# Patient Record
Sex: FEMALE | Race: WHITE | NOT HISPANIC OR LATINO | Employment: FULL TIME | URBAN - METROPOLITAN AREA
[De-identification: names, ages, dates, MRNs, and addresses within clinical notes are randomized per-mention and may not be internally consistent; named-entity substitution may affect disease eponyms.]

---

## 2019-03-22 PROBLEM — L70.9 ACNE: Status: ACTIVE | Noted: 2019-03-22

## 2019-03-22 PROBLEM — Z86.59 HISTORY OF MAJOR DEPRESSION: Status: ACTIVE | Noted: 2019-03-22

## 2019-03-22 PROBLEM — Z86.59 HISTORY OF POSTTRAUMATIC STRESS DISORDER (PTSD): Status: ACTIVE | Noted: 2019-03-22

## 2019-03-22 PROBLEM — G43.109 MIGRAINE HEADACHE WITH AURA: Status: ACTIVE | Noted: 2019-03-22

## 2019-03-22 PROBLEM — N94.6 DYSMENORRHEA: Status: ACTIVE | Noted: 2019-03-22

## 2019-03-25 ENCOUNTER — OFFICE VISIT (OUTPATIENT)
Dept: INTERNAL MEDICINE | Facility: CLINIC | Age: 29
End: 2019-03-25
Payer: COMMERCIAL

## 2019-03-25 VITALS
WEIGHT: 136.44 LBS | DIASTOLIC BLOOD PRESSURE: 76 MMHG | BODY MASS INDEX: 24.18 KG/M2 | SYSTOLIC BLOOD PRESSURE: 126 MMHG | HEART RATE: 88 BPM | OXYGEN SATURATION: 99 % | HEIGHT: 63 IN

## 2019-03-25 DIAGNOSIS — R01.1 HEART MURMUR: ICD-10-CM

## 2019-03-25 DIAGNOSIS — L50.9 URTICARIA: ICD-10-CM

## 2019-03-25 DIAGNOSIS — R20.2 PARESTHESIA: ICD-10-CM

## 2019-03-25 DIAGNOSIS — G43.109 MIGRAINE WITH AURA AND WITHOUT STATUS MIGRAINOSUS, NOT INTRACTABLE: Primary | ICD-10-CM

## 2019-03-25 DIAGNOSIS — L70.9 ACNE, UNSPECIFIED ACNE TYPE: ICD-10-CM

## 2019-03-25 DIAGNOSIS — N94.6 DYSMENORRHEA: ICD-10-CM

## 2019-03-25 DIAGNOSIS — Z13.6 ENCOUNTER FOR LIPID SCREENING FOR CARDIOVASCULAR DISEASE: ICD-10-CM

## 2019-03-25 DIAGNOSIS — Z13.29 SCREENING FOR THYROID DISORDER: ICD-10-CM

## 2019-03-25 DIAGNOSIS — Z00.00 HEALTHCARE MAINTENANCE: ICD-10-CM

## 2019-03-25 DIAGNOSIS — R03.0 ELEVATED BLOOD PRESSURE READING WITHOUT DIAGNOSIS OF HYPERTENSION: ICD-10-CM

## 2019-03-25 DIAGNOSIS — Z13.220 ENCOUNTER FOR LIPID SCREENING FOR CARDIOVASCULAR DISEASE: ICD-10-CM

## 2019-03-25 DIAGNOSIS — Z13.1 ENCOUNTER FOR SCREENING FOR DIABETES MELLITUS: ICD-10-CM

## 2019-03-25 DIAGNOSIS — Z80.3 FAMILY HISTORY OF BREAST CANCER: ICD-10-CM

## 2019-03-25 PROCEDURE — 99999 PR PBB SHADOW E&M-NEW PATIENT-LVL III: ICD-10-PCS | Mod: PBBFAC,,, | Performed by: INTERNAL MEDICINE

## 2019-03-25 PROCEDURE — 99999 PR PBB SHADOW E&M-NEW PATIENT-LVL III: CPT | Mod: PBBFAC,,, | Performed by: INTERNAL MEDICINE

## 2019-03-25 PROCEDURE — 99205 PR OFFICE/OUTPT VISIT, NEW, LEVL V, 60-74 MIN: ICD-10-PCS | Mod: S$GLB,,, | Performed by: INTERNAL MEDICINE

## 2019-03-25 PROCEDURE — 99205 OFFICE O/P NEW HI 60 MIN: CPT | Mod: S$GLB,,, | Performed by: INTERNAL MEDICINE

## 2019-03-25 PROCEDURE — 3008F PR BODY MASS INDEX (BMI) DOCUMENTED: ICD-10-PCS | Mod: CPTII,S$GLB,, | Performed by: INTERNAL MEDICINE

## 2019-03-25 PROCEDURE — 3008F BODY MASS INDEX DOCD: CPT | Mod: CPTII,S$GLB,, | Performed by: INTERNAL MEDICINE

## 2019-03-25 RX ORDER — LORATADINE 10 MG/1
10 TABLET ORAL DAILY
COMMUNITY
End: 2021-09-29

## 2019-03-25 RX ORDER — SUMATRIPTAN 50 MG/1
50 TABLET, FILM COATED ORAL
Qty: 18 TABLET | Refills: 3 | Status: SHIPPED | OUTPATIENT
Start: 2019-03-25 | End: 2019-04-15

## 2019-03-25 NOTE — PROGRESS NOTES
Subjective:       Patient ID: Delaney Arechiga is a 29 y.o. female who  has a past medical history of Family history of breast cancer, Heart murmur (3/25/2019), and Urticaria.    Chief Complaint: Establish Care; Headache; and Hypertension     HPI    History was obtained from the patient and supplemented through chart review  There were no prior records to review.  Has upcoming appointment with OBGYN and Dermatology.    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    Migraine with aura:   The patient complains of bitemporal, behind both eyes, bioccipatal headaches since 14 yoa.  She ran out of Imitrex 1 year ago.  The headache used to always last for days, but never 7 days.  Over the past few months after she started her new job, it has been lasting 7 days; she will be migraine free for a few days, it will then return. It is progressively worsening in duration.  She tends to not have migraines on the weekends, but can also have migraines lasting 7 days.  Imitrex helped in the past (requests brand name Imitrex due to family history of anaphylaxis to sumatriptan).  Has a strong family history of migraines.  Her mom is allergic to Botox injections.      It is associated with photophobia, phonophobia and nausea.  Tylenol, NSAIDs have not helped in the past.  Triggers include lack of sleep and stress.  There is aura with flashing lights.  Takes Zyrtec daily for urticaria.  Denies rhinorrhea, but with eye tearing not always associated with HA.  Her father also has cluster headache.    Currently on IUD.  Used to be on Nexplanon.  Was on OCPs a very long time ago.  No change in headaches with changes in birth control.    Elevated BP:  Has checked her BP at work, and has been 124 /80s to 90s.    Acne vulgaris:  Follows with Dermatology.    Dysmenorrhea:    Has excruciating suprapubic cramps.  Denies dysuria, change in vaginal discharge. Abdominal cramps occur every month.  Is on the IUD and has had lighter periods.  Does not  try anything OTC.  Has upcoming appointment with OBGYN.    LLE paresthesia:  Has seen a Chiropractor without relief.  In addition, also has feet numbness after sitting.  Denies dietary restrictions.    Family history of breast cancer:  MGM, 2 maternal aunts with breast cancer; the youngest was that age 35. PGM with ovarian cancer.  No one in her family has been checked for BRCA mutation.    Urticaria:  Takes Claritin daily for hives.  Has not seen an allergist due to cost.    Heart Murmur:  Murmur has been present since childhood.  Has sharp chest pain about once a week lasting for about a second without SOB, ANN, lightheadedness, dizziness.  Is associated with caffeine intake.    Review of Systems   Constitutional: Positive for activity change. Negative for unexpected weight change.   HENT: Negative for hearing loss, rhinorrhea and trouble swallowing.    Eyes: Negative for discharge and visual disturbance.   Respiratory: Negative for chest tightness and wheezing.    Cardiovascular: Negative for chest pain and palpitations.   Gastrointestinal: Negative for blood in stool, constipation, diarrhea and vomiting.   Endocrine: Negative for polydipsia and polyuria.   Genitourinary: Positive for menstrual problem. Negative for difficulty urinating, dysuria and hematuria.   Musculoskeletal: Negative for arthralgias, joint swelling and neck pain.   Skin: Positive for rash. Negative for wound.        Chronic urticaria   Neurological: Positive for headaches. Negative for weakness.   Psychiatric/Behavioral: Positive for dysphoric mood. Negative for confusion.       Past Medical History:   Diagnosis Date    Family history of breast cancer     Heart murmur 3/25/2019    Urticaria      Past Surgical History:   Procedure Laterality Date    BUNIONECTOMY Right     TONSILLECTOMY       Family History   Problem Relation Age of Onset    Migraines Mother     Stroke Mother     Migraines Father     Aortic aneurysm Father      "Peripheral vascular disease Father         s/p leg stent    Migraines Sister     Breast cancer Maternal Grandmother 80    Heart failure Maternal Grandfather     Ovarian cancer Paternal Grandmother     Breast cancer Maternal Aunt 35    Breast cancer Maternal Aunt 40     Social History     Socioeconomic History    Marital status: Single     Spouse name: Not on file    Number of children: Not on file    Years of education: Not on file    Highest education level: Not on file   Occupational History    Not on file   Social Needs    Financial resource strain: Not on file    Food insecurity:     Worry: Not on file     Inability: Not on file    Transportation needs:     Medical: Not on file     Non-medical: Not on file   Tobacco Use    Smoking status: Former Smoker     Types: Cigarettes     Last attempt to quit: 2018     Years since quittin.2    Smokeless tobacco: Never Used   Substance and Sexual Activity    Alcohol use: Not on file    Drug use: Not on file    Sexual activity: Not on file   Lifestyle    Physical activity:     Days per week: Not on file     Minutes per session: Not on file    Stress: Not on file   Relationships    Social connections:     Talks on phone: Not on file     Gets together: Not on file     Attends Latter-day service: Not on file     Active member of club or organization: Not on file     Attends meetings of clubs or organizations: Not on file     Relationship status: Not on file    Intimate partner violence:     Fear of current or ex partner: Not on file     Emotionally abused: Not on file     Physically abused: Not on file     Forced sexual activity: Not on file   Other Topics Concern    Not on file   Social History Narrative    Not on file     Objective:      Vitals:    19 1536   BP: 126/76   Pulse: 88   SpO2: 99%   Weight: 61.9 kg (136 lb 7.4 oz)   Height: 5' 3" (1.6 m)      Physical Exam   Constitutional: She appears well-developed and well-nourished. No " distress.   HENT:   Head: Normocephalic and atraumatic.   Nose: Nose normal.   Mouth/Throat: Oropharynx is clear and moist. No oropharyngeal exudate.   Eyes: Pupils are equal, round, and reactive to light. Conjunctivae and EOM are normal. Right eye exhibits no discharge. Left eye exhibits no discharge. No scleral icterus.   Neck: Neck supple. No tracheal deviation present. No thyromegaly present.   Cardiovascular: Normal rate, regular rhythm and intact distal pulses.   Murmur heard.  2/6 heart murmur   Pulmonary/Chest: Effort normal and breath sounds normal. No respiratory distress. She has no wheezes.   Abdominal: Soft. Bowel sounds are normal. There is no tenderness.   Musculoskeletal: She exhibits no edema or deformity.   Lymphadenopathy:     She has no cervical adenopathy.   Neurological: She is alert. No cranial nerve deficit. Gait normal.   Skin: Skin is warm and dry. Capillary refill takes less than 2 seconds. No rash noted. She is not diaphoretic. No erythema.   Psychiatric: She has a normal mood and affect. Her behavior is normal.         No results found for: WBC, HGB, HCT, PLT, CHOL, TRIG, HDL, LDLDIRECT, ALT, AST, NA, K, CL, CREATININE, BUN, CO2, TSH, PSA, INR, GLUF, HGBA1C, MICROALBUR    The ASCVD Risk score (Kiana DC Jr., et al., 2013) failed to calculate for the following reasons:    The 2013 ASCVD risk score is only valid for ages 40 to 79    (Imaging have been independently reviewed)  none    Assessment:       1. Migraine with aura and without status migrainosus, not intractable    2. Elevated blood pressure reading without diagnosis of hypertension    3. Acne, unspecified acne type    4. Dysmenorrhea    5. Paresthesia    6. Family history of breast cancer    7. Urticaria    8. Heart murmur    9. Healthcare maintenance    10. Encounter for lipid screening for cardiovascular disease    11. Encounter for screening for diabetes mellitus    12. Screening for thyroid disorder          Plan:       Delaney  was seen today for establish care, headache and hypertension.    Diagnoses and all orders for this visit:    Migraine with aura and without status migrainosus, not intractable  Comments:  Increasing in duration after starting her new job. Imitrex at onset of HA, try to reduce triggers. If lasting for days, trial of Prednisone. Consider PPX, neuro  Orders:  -     TSH; Future  -     sumatriptan (IMITREX) 50 MG tablet; Take 1 tablet (50 mg total) by mouth every 2 (two) hours as needed for Migraine.    Elevated blood pressure reading without diagnosis of hypertension  Comments:  Increased diastolic BP 90s.  No medications at this time.  Will continue to monitor    Acne, unspecified acne type  Comments:  Will see Dermatology.    Dysmenorrhea  Comments:  Severe abdominal cramps.  On IUD.  Will see OBGYN.  Orders:  -     CBC auto differential; Future  -     TSH; Future    Paresthesia  Comments:  LLE and feet.  Check A1c, B12.  Will address at future visit  Orders:  -     Comprehensive metabolic panel; Future  -     Hemoglobin A1c; Future  -     Vitamin B12; Future    Family history of breast cancer  Comments:  Strong family history with early FHx breast cancer. No one in her family has been checked for BRCA mutation.  Will discuss with OBGYN.    Urticaria  Comments:  On Claritin daily.  No acute issues.    Heart murmur  Comments:  Since childhood.  Very transient sharp CP without other associated symptoms. Assoc with caffeine. If more persistent or increased duration, will pursue TTE    Healthcare maintenance  -     CBC auto differential; Future  -     Comprehensive metabolic panel; Future    Encounter for lipid screening for cardiovascular disease  -     Lipid panel; Future    Encounter for screening for diabetes mellitus  -     Hemoglobin A1c; Future    Screening for thyroid disorder  Comments:  Checking due to menstrual cramps, persistent migraine  Orders:  -     TSH; Future         I have spent 40 minutes face to face  with the patient, at least of 50% of which was spent counseling on migraine.    Notification of Lab Results: [facility]    Side effects of medication(s) were discussed in detail and patient voiced understanding.  Patient will call back for any issues or complications.     RTC in 1 month(s) or sooner PRN for migraine with aura.  Consider adding prophylactic treatment.

## 2019-03-26 ENCOUNTER — PATIENT MESSAGE (OUTPATIENT)
Dept: INTERNAL MEDICINE | Facility: CLINIC | Age: 29
End: 2019-03-26

## 2019-03-27 ENCOUNTER — OFFICE VISIT (OUTPATIENT)
Dept: OBSTETRICS AND GYNECOLOGY | Facility: CLINIC | Age: 29
End: 2019-03-27
Payer: COMMERCIAL

## 2019-03-27 VITALS
HEIGHT: 63 IN | BODY MASS INDEX: 24.45 KG/M2 | DIASTOLIC BLOOD PRESSURE: 70 MMHG | WEIGHT: 138 LBS | SYSTOLIC BLOOD PRESSURE: 100 MMHG

## 2019-03-27 DIAGNOSIS — Z11.3 VENEREAL DISEASE SCREENING: ICD-10-CM

## 2019-03-27 DIAGNOSIS — Z01.411 ENCOUNTER FOR GYNECOLOGICAL EXAMINATION WITH ABNORMAL FINDING: Primary | ICD-10-CM

## 2019-03-27 DIAGNOSIS — Z12.4 PAP SMEAR FOR CERVICAL CANCER SCREENING: ICD-10-CM

## 2019-03-27 DIAGNOSIS — R10.2 FEMALE PELVIC PAIN: ICD-10-CM

## 2019-03-27 DIAGNOSIS — N94.6 DYSMENORRHEA: ICD-10-CM

## 2019-03-27 PROCEDURE — 99385 PREV VISIT NEW AGE 18-39: CPT | Mod: S$GLB,,, | Performed by: OBSTETRICS & GYNECOLOGY

## 2019-03-27 PROCEDURE — 88175 CYTOPATH C/V AUTO FLUID REDO: CPT

## 2019-03-27 PROCEDURE — 87624 HPV HI-RISK TYP POOLED RSLT: CPT

## 2019-03-27 PROCEDURE — 99999 PR PBB SHADOW E&M-EST. PATIENT-LVL III: CPT | Mod: PBBFAC,,, | Performed by: OBSTETRICS & GYNECOLOGY

## 2019-03-27 PROCEDURE — 99999 PR PBB SHADOW E&M-EST. PATIENT-LVL III: ICD-10-PCS | Mod: PBBFAC,,, | Performed by: OBSTETRICS & GYNECOLOGY

## 2019-03-27 PROCEDURE — 87491 CHLMYD TRACH DNA AMP PROBE: CPT

## 2019-03-27 PROCEDURE — 99385 PR PREVENTIVE VISIT,NEW,18-39: ICD-10-PCS | Mod: S$GLB,,, | Performed by: OBSTETRICS & GYNECOLOGY

## 2019-03-27 NOTE — PROGRESS NOTES
Subjective:       Patient ID: Delaney Arechiga is a 29 y.o. female.    Chief Complaint:  Gynecologic Exam and Dysmenorrhea      History of Present Illness  HPI    Delaney Arechiga is a 29 y.o. female  NEW TO ME here for her annual GYN exam.  She requests STD testing. She also requests a Hysterectomy due to chronic pelvic pain. She had menarche at age 14, and was on OCP's due to severe cramps and heavy cycles. She then had the Implanon due to forgetting to take pills, as well as severe migraines during the week of the periods, and had the implanon for 3 years followed by the Nexplanon for 2 years. Cycles were light and erratic but often bled for up to three weeks on the nexplanon. Mirena was placed in early .  She describes her periods as regular, light flow, lasting 3-7 days since placement of her IUD..Still has dysmenorrhea and continues to have pelvic pain(although this has improved somewhat with the IUD)   denies break through bleeding.   denies vaginal itching or irritation.  Denies vaginal discharge.  She is not currently sexually active. She has had 1 partner during the past year.  She uses IUD for contraception.   History of abnormal pap: Yes - No treatment needed  Last Pap: approximate date  and was normal  Last MMG: None    denies domestic violence. She does feel safe at home.     Past Medical History:   Diagnosis Date    Abnormal Pap smear of cervix     Family history of breast cancer     Heart murmur 3/25/2019    Urticaria      Past Surgical History:   Procedure Laterality Date    BUNIONECTOMY Right     COLPOSCOPY      TONSILLECTOMY       Social History     Socioeconomic History    Marital status: Single     Spouse name: Not on file    Number of children: Not on file    Years of education: Not on file    Highest education level: Not on file   Occupational History    Not on file   Social Needs    Financial resource strain: Not on file    Food insecurity:     Worry: Not on  "file     Inability: Not on file    Transportation needs:     Medical: Not on file     Non-medical: Not on file   Tobacco Use    Smoking status: Former Smoker     Types: Cigarettes     Last attempt to quit: 2018     Years since quittin.2    Smokeless tobacco: Never Used   Substance and Sexual Activity    Alcohol use: Not Currently    Drug use: Never    Sexual activity: Not Currently     Partners: Male     Birth control/protection: IUD     Comment: Mirena placed 2017   Lifestyle    Physical activity:     Days per week: Not on file     Minutes per session: Not on file    Stress: Not on file   Relationships    Social connections:     Talks on phone: Not on file     Gets together: Not on file     Attends Taoism service: Not on file     Active member of club or organization: Not on file     Attends meetings of clubs or organizations: Not on file     Relationship status: Not on file    Intimate partner violence:     Fear of current or ex partner: Not on file     Emotionally abused: Not on file     Physically abused: Not on file     Forced sexual activity: Not on file   Other Topics Concern    Not on file   Social History Narrative    Not on file     Family History   Problem Relation Age of Onset    Migraines Mother     Stroke Mother 67    Hypertension Mother     Migraines Father     Aortic aneurysm Father     Peripheral vascular disease Father         s/p leg stent    Migraines Sister     Breast cancer Maternal Grandmother 80    Heart failure Maternal Grandfather     Ovarian cancer Paternal Grandmother     Breast cancer Maternal Aunt 35    Breast cancer Maternal Aunt 40    Colon cancer Neg Hx     Diabetes Neg Hx      OB History        0    Para   0    Term   0       0    AB   0    Living   0       SAB   0    TAB   0    Ectopic   0    Multiple   0    Live Births   0                 /70   Ht 5' 3" (1.6 m)   Wt 62.6 kg (138 lb 0.1 oz)   LMP 2019   " BMI 24.45 kg/m²         GYN & OB History  Patient's last menstrual period was 2019.   Date of Last Pap: No result found    OB History    Para Term  AB Living   0 0 0 0 0 0   SAB TAB Ectopic Multiple Live Births   0 0 0 0 0       Review of Systems  Review of Systems   Constitutional: Negative for activity change, appetite change, fatigue and unexpected weight change.   HENT: Negative.    Eyes: Negative for visual disturbance.   Respiratory: Negative for shortness of breath and wheezing.    Cardiovascular: Negative for chest pain, palpitations and leg swelling.   Gastrointestinal: Negative for abdominal pain, bloating and blood in stool.   Endocrine: Negative for diabetes and hair loss.   Genitourinary: Positive for dysmenorrhea, dyspareunia, menstrual problem and pelvic pain. Negative for decreased libido and vaginal dryness.   Musculoskeletal: Negative for back pain and joint swelling.   Integumentary:  Negative for acne, hair changes and nipple discharge.   Neurological: Negative for headaches.   Hematological: Does not bruise/bleed easily.   Psychiatric/Behavioral: Positive for depression. Negative for sleep disturbance. The patient is not nervous/anxious.    Breast: Negative for mastodynia and nipple discharge          Objective:      Physical Exam:   Constitutional: She is oriented to person, place, and time. She appears well-developed and well-nourished.    HENT:   Head: Normocephalic and atraumatic.    Eyes: Pupils are equal, round, and reactive to light. EOM are normal.    Neck: Normal range of motion. Neck supple.    Cardiovascular: Normal rate and regular rhythm.     Pulmonary/Chest: Effort normal and breath sounds normal.   BREASTS:  no mass, no tenderness, no deformity and no retraction. Right breast exhibits no inverted nipple, no mass, no nipple discharge, no skin change, no tenderness, no bleeding and no swelling. Left breast exhibits no inverted nipple, no mass, no nipple  discharge, no skin change, no tenderness, no bleeding and no swelling. Breasts are symmetrical.              Abdominal: Soft. Bowel sounds are normal.     Genitourinary: Pelvic exam was performed with patient supine.   Genitourinary Comments: PELVIC: Normal external genitalia without lesions.  Normal hair distribution.  Adequate perineal body, normal urethral meatus.  Vagina moist and well rugated without lesions or discharge.  Cervix pink, without lesions, discharge or tenderness. IUD STRINGS IN PLACE.  No significant cystocele or rectocele.  Bimanual exam shows uterus to be normal size, regular, mobile and mildly tender.  Adnexa without masses or tenderness on the left, Mild tenderness on the Right. + [place] tenderness.                Musculoskeletal: Normal range of motion and moves all extremeties.       Neurological: She is alert and oriented to person, place, and time.    Skin: Skin is warm and dry.    Psychiatric: She has a normal mood and affect.              Assessment:        1. Encounter for gynecological examination with abnormal finding    2. Pap smear for cervical cancer screening    3. Female pelvic pain    4. Dysmenorrhea                Plan:      1. Encounter for gynecological examination with abnormal finding  Discussed strong possibility of Endometriosis, will need to consider Laparoscopy for definitive diagnosis, and further management.     2. Pap smear for cervical cancer screening    - Liquid-based pap smear, screening  - HPV High Risk Genotypes, PCR    3. Female pelvic pain    - C. trachomatis/N. gonorrhoeae by AMP DNA    4. Dysmenorrhea      5. Venereal disease screening    - Hepatitis panel, acute; Future  - Hepatitis C antibody; Future  - HIV 1/2 Ag/Ab (4th Gen); Future  - RPR; Future       Follow up in about 1 year (around 3/27/2020).

## 2019-03-27 NOTE — Clinical Note
Please schedule for consultation for evaluation , has probable endometriosis based on history and physical findings, has never had laparoscopic diagnosis.

## 2019-03-28 ENCOUNTER — LAB VISIT (OUTPATIENT)
Dept: LAB | Facility: OTHER | Age: 29
End: 2019-03-28
Attending: INTERNAL MEDICINE
Payer: COMMERCIAL

## 2019-03-28 ENCOUNTER — TELEPHONE (OUTPATIENT)
Dept: OBSTETRICS AND GYNECOLOGY | Facility: CLINIC | Age: 29
End: 2019-03-28

## 2019-03-28 DIAGNOSIS — Z13.6 ENCOUNTER FOR LIPID SCREENING FOR CARDIOVASCULAR DISEASE: ICD-10-CM

## 2019-03-28 DIAGNOSIS — R20.2 PARESTHESIA: ICD-10-CM

## 2019-03-28 DIAGNOSIS — Z13.1 ENCOUNTER FOR SCREENING FOR DIABETES MELLITUS: ICD-10-CM

## 2019-03-28 DIAGNOSIS — N94.6 DYSMENORRHEA: ICD-10-CM

## 2019-03-28 DIAGNOSIS — Z13.29 SCREENING FOR THYROID DISORDER: ICD-10-CM

## 2019-03-28 DIAGNOSIS — Z11.3 VENEREAL DISEASE SCREENING: ICD-10-CM

## 2019-03-28 DIAGNOSIS — Z13.220 ENCOUNTER FOR LIPID SCREENING FOR CARDIOVASCULAR DISEASE: ICD-10-CM

## 2019-03-28 DIAGNOSIS — G43.109 MIGRAINE WITH AURA AND WITHOUT STATUS MIGRAINOSUS, NOT INTRACTABLE: ICD-10-CM

## 2019-03-28 DIAGNOSIS — Z00.00 HEALTHCARE MAINTENANCE: ICD-10-CM

## 2019-03-28 LAB
ALBUMIN SERPL BCP-MCNC: 4.4 G/DL (ref 3.5–5.2)
ALP SERPL-CCNC: 44 U/L (ref 55–135)
ALT SERPL W/O P-5'-P-CCNC: 12 U/L (ref 10–44)
ANION GAP SERPL CALC-SCNC: 6 MMOL/L (ref 8–16)
AST SERPL-CCNC: 14 U/L (ref 10–40)
BASOPHILS # BLD AUTO: 0.01 K/UL (ref 0–0.2)
BASOPHILS NFR BLD: 0.2 % (ref 0–1.9)
BILIRUB SERPL-MCNC: 0.6 MG/DL (ref 0.1–1)
BUN SERPL-MCNC: 6 MG/DL (ref 6–20)
C TRACH DNA SPEC QL NAA+PROBE: NOT DETECTED
CALCIUM SERPL-MCNC: 9.3 MG/DL (ref 8.7–10.5)
CHLORIDE SERPL-SCNC: 106 MMOL/L (ref 95–110)
CHOLEST SERPL-MCNC: 148 MG/DL (ref 120–199)
CHOLEST/HDLC SERPL: 2.5 {RATIO} (ref 2–5)
CO2 SERPL-SCNC: 26 MMOL/L (ref 23–29)
CREAT SERPL-MCNC: 0.7 MG/DL (ref 0.5–1.4)
DIFFERENTIAL METHOD: NORMAL
EOSINOPHIL # BLD AUTO: 0.1 K/UL (ref 0–0.5)
EOSINOPHIL NFR BLD: 2 % (ref 0–8)
ERYTHROCYTE [DISTWIDTH] IN BLOOD BY AUTOMATED COUNT: 12 % (ref 11.5–14.5)
EST. GFR  (AFRICAN AMERICAN): >60 ML/MIN/1.73 M^2
EST. GFR  (NON AFRICAN AMERICAN): >60 ML/MIN/1.73 M^2
ESTIMATED AVG GLUCOSE: 80 MG/DL (ref 68–131)
GLUCOSE SERPL-MCNC: 71 MG/DL (ref 70–110)
HBA1C MFR BLD HPLC: 4.4 % (ref 4–5.6)
HCT VFR BLD AUTO: 41.6 % (ref 37–48.5)
HDLC SERPL-MCNC: 59 MG/DL (ref 40–75)
HDLC SERPL: 39.9 % (ref 20–50)
HGB BLD-MCNC: 14.5 G/DL (ref 12–16)
LDLC SERPL CALC-MCNC: 72.6 MG/DL (ref 63–159)
LYMPHOCYTES # BLD AUTO: 2.4 K/UL (ref 1–4.8)
LYMPHOCYTES NFR BLD: 47.4 % (ref 18–48)
MCH RBC QN AUTO: 30.4 PG (ref 27–31)
MCHC RBC AUTO-ENTMCNC: 34.9 G/DL (ref 32–36)
MCV RBC AUTO: 87 FL (ref 82–98)
MONOCYTES # BLD AUTO: 0.3 K/UL (ref 0.3–1)
MONOCYTES NFR BLD: 6.7 % (ref 4–15)
N GONORRHOEA DNA SPEC QL NAA+PROBE: NOT DETECTED
NEUTROPHILS # BLD AUTO: 2.2 K/UL (ref 1.8–7.7)
NEUTROPHILS NFR BLD: 43.5 % (ref 38–73)
NONHDLC SERPL-MCNC: 89 MG/DL
PLATELET # BLD AUTO: 268 K/UL (ref 150–350)
PMV BLD AUTO: 10.8 FL (ref 9.2–12.9)
POTASSIUM SERPL-SCNC: 3.6 MMOL/L (ref 3.5–5.1)
PROT SERPL-MCNC: 7.2 G/DL (ref 6–8.4)
RBC # BLD AUTO: 4.77 M/UL (ref 4–5.4)
SODIUM SERPL-SCNC: 138 MMOL/L (ref 136–145)
TRIGL SERPL-MCNC: 82 MG/DL (ref 30–150)
TSH SERPL DL<=0.005 MIU/L-ACNC: 0.89 UIU/ML (ref 0.4–4)
VIT B12 SERPL-MCNC: 384 PG/ML (ref 210–950)
WBC # BLD AUTO: 5.04 K/UL (ref 3.9–12.7)

## 2019-03-28 PROCEDURE — 86592 SYPHILIS TEST NON-TREP QUAL: CPT

## 2019-03-28 PROCEDURE — 83036 HEMOGLOBIN GLYCOSYLATED A1C: CPT

## 2019-03-28 PROCEDURE — 86703 HIV-1/HIV-2 1 RESULT ANTBDY: CPT

## 2019-03-28 PROCEDURE — 84443 ASSAY THYROID STIM HORMONE: CPT

## 2019-03-28 PROCEDURE — 80053 COMPREHEN METABOLIC PANEL: CPT

## 2019-03-28 PROCEDURE — 36415 COLL VENOUS BLD VENIPUNCTURE: CPT

## 2019-03-28 PROCEDURE — 85025 COMPLETE CBC W/AUTO DIFF WBC: CPT

## 2019-03-28 PROCEDURE — 80061 LIPID PANEL: CPT

## 2019-03-28 PROCEDURE — 82607 VITAMIN B-12: CPT

## 2019-03-28 PROCEDURE — 80074 ACUTE HEPATITIS PANEL: CPT

## 2019-03-28 NOTE — TELEPHONE ENCOUNTER
----- Message from Flor jN MD sent at 3/27/2019  5:49 PM CDT -----  Please schedule for consultation for evaluation , has probable endometriosis based on history and physical findings, has never had laparoscopic diagnosis.

## 2019-03-28 NOTE — TELEPHONE ENCOUNTER
LVM:  Good Morning Ms Arechiga, this is Ольга from Dr Thompson's office. DR Nj has asked us to contact you and see about scheduling you with Dr Thompson for possible Endomitosis.  Please call our office back at [phone].    Thank You

## 2019-03-29 LAB
HAV IGM SERPL QL IA: NEGATIVE
HBV CORE IGM SERPL QL IA: NEGATIVE
HBV SURFACE AG SERPL QL IA: NEGATIVE
HCV AB SERPL QL IA: NEGATIVE
HCV AB SERPL QL IA: NEGATIVE
HIV 1+2 AB+HIV1 P24 AG SERPL QL IA: NEGATIVE

## 2019-04-01 LAB — RPR SER QL: NORMAL

## 2019-04-02 LAB
HPV HR 12 DNA CVX QL NAA+PROBE: POSITIVE
HPV16 AG SPEC QL: NEGATIVE
HPV18 DNA SPEC QL NAA+PROBE: NEGATIVE

## 2019-04-05 ENCOUNTER — PATIENT MESSAGE (OUTPATIENT)
Dept: INTERNAL MEDICINE | Facility: CLINIC | Age: 29
End: 2019-04-05

## 2019-04-05 ENCOUNTER — OFFICE VISIT (OUTPATIENT)
Dept: DERMATOLOGY | Facility: CLINIC | Age: 29
End: 2019-04-05
Payer: COMMERCIAL

## 2019-04-05 DIAGNOSIS — L70.0 ACNE VULGARIS: Primary | ICD-10-CM

## 2019-04-05 PROCEDURE — 99999 PR PBB SHADOW E&M-EST. PATIENT-LVL II: CPT | Mod: PBBFAC,,, | Performed by: PHYSICIAN ASSISTANT

## 2019-04-05 PROCEDURE — 99999 PR PBB SHADOW E&M-EST. PATIENT-LVL II: ICD-10-PCS | Mod: PBBFAC,,, | Performed by: PHYSICIAN ASSISTANT

## 2019-04-05 PROCEDURE — 99203 PR OFFICE/OUTPT VISIT, NEW, LEVL III, 30-44 MIN: ICD-10-PCS | Mod: S$GLB,,, | Performed by: PHYSICIAN ASSISTANT

## 2019-04-05 PROCEDURE — 99203 OFFICE O/P NEW LOW 30 MIN: CPT | Mod: S$GLB,,, | Performed by: PHYSICIAN ASSISTANT

## 2019-04-05 RX ORDER — SPIRONOLACTONE 50 MG/1
TABLET, FILM COATED ORAL
Qty: 60 TABLET | Refills: 2 | Status: SHIPPED | OUTPATIENT
Start: 2019-04-05 | End: 2019-07-22 | Stop reason: SDUPTHER

## 2019-04-05 RX ORDER — CLINDAMYCIN PHOSPHATE AND TRETINOIN GEL 1.2%/0.025% 10; .25 MG/G; MG/G
GEL TOPICAL NIGHTLY
Qty: 30 G | Refills: 2 | Status: SHIPPED | OUTPATIENT
Start: 2019-04-05 | End: 2019-06-04

## 2019-04-05 NOTE — PROGRESS NOTES
"  Subjective:       Patient ID:  Delaney Arechiga is a 29 y.o. female who presents for   Chief Complaint   Patient presents with    Acne     Face,chest,neck,right and left shoulders     Acne  - Initial  Affected locations: face, back, chest, right shoulder, left shoulder and neck  Duration: 15 years  Signs / symptoms: tender and redness  Timing: constant  Aggravated by: menses and stress  Treatments tried: retinol and hyalauronic acid serum qhs, tea tree oil, vitmain E oil, pyrithicone zinc bar soap bid.  Improvement on treatment: mild    Was seeing derm in [place] years ago. Has been on oral abx (gets yeast infxn), tretinoin, clinda soln, BPO (too drying), SA (does not like - "gives tiny pimples").    Review of Systems   HENT: Positive for headaches (frequent migraines). Negative for nosebleeds.    Gastrointestinal: Positive for Sensitivity to oral antibiotics (yeast infxn). Negative for diarrhea.   Genitourinary: Negative for irregular periods (has IUD).   Musculoskeletal: Positive for arthralgias (occasional - hypermobile).   Skin: Positive for daily sunscreen use (in makeup) and activity-related sunscreen use. Negative for recent sunburn.   Neurological: Positive for headaches (frequent migraines).   Psychiatric/Behavioral: Positive for depressed mood (hx of).        Objective:    Physical Exam   Constitutional: She appears well-developed and well-nourished. No distress.   Neurological: She is alert and oriented to person, place, and time. She is not disoriented.   Psychiatric: She has a normal mood and affect.   Skin:   Areas Examined (abnormalities noted in diagram):   Head / Face Inspection Performed  Neck Inspection Performed  Chest / Axilla Inspection Performed  Back Inspection Performed  RUE Inspected  LUE Inspection Performed                   Diagram Legend     Erythematous scaling macule/papule c/w actinic keratosis       Vascular papule c/w angioma      Pigmented verrucoid papule/plaque c/w seborrheic " keratosis      Yellow umbilicated papule c/w sebaceous hyperplasia      Irregularly shaped tan macule c/w lentigo     1-2 mm smooth white papules consistent with Milia      Movable subcutaneous cyst with punctum c/w epidermal inclusion cyst      Subcutaneous movable cyst c/w pilar cyst      Firm pink to brown papule c/w dermatofibroma      Pedunculated fleshy papule(s) c/w skin tag(s)      Evenly pigmented macule c/w junctional nevus     Mildly variegated pigmented, slightly irregular-bordered macule c/w mildly atypical nevus      Flesh colored to evenly pigmented papule c/w intradermal nevus       Pink pearly papule/plaque c/w basal cell carcinoma      Erythematous hyperkeratotic cursted plaque c/w SCC      Surgical scar with no sign of skin cancer recurrence      Open and closed comedones      Inflammatory papules and pustules      Verrucoid papule consistent consistent with wart     Erythematous eczematous patches and plaques     Dystrophic onycholytic nail with subungual debris c/w onychomycosis     Umbilicated papule    Erythematous-base heme-crusted tan verrucoid plaque consistent with inflamed seborrheic keratosis     Erythematous Silvery Scaling Plaque c/w Psoriasis     See annotation      Assessment / Plan:        Acne vulgaris  -     spironolactone (ALDACTONE) 50 MG tablet; Start with 1 po qday, increase to 2 po qday as tolerated  Dispense: 60 tablet; Refill: 2  -     clindamycin-tretinoin (ZIANA) gel; Apply topically every evening.  Dispense: 30 g; Refill: 2    Discussed benefits and risks of therapy including but not limited to breakthrough bleeding, breast tenderness, and elevated potassium levels which may give symptoms of fatigue, palpitations, and nausea. Patient should limit potassium intake - avoid potassium supplements or salt substitutes, limit bananas and citrus fruits. Pregnancy must be avoided while taking spironolactone.    Discussed benefits and risks of topical retinoid. Brochure  provided.    Continue washing face twice daily.       Follow up in about 2 months (around 6/5/2019).

## 2019-04-05 NOTE — PATIENT INSTRUCTIONS
Discussed benefits and risks of therapy including but not limited to breakthrough bleeding, breast tenderness, and elevated potassium levels which may give symptoms of fatigue, palpitations, and nausea. Patient should limit potassium intake - avoid potassium supplements or salt substitutes, limit bananas and citrus fruits. Pregnancy must be avoided while taking spironolactone.    Continue washing face twice daily.    RETINOIDS           Your doctor has prescribed a topical retinoid for your skin. A retinoid is a vitamin A derived product used to treat a variety of skin conditions including acne, actinic keratoses (pre-skin cancers), uneven pigmentation from sun damage, fine lines and wrinkles, and enlarged pores.    How do they work?         Retinoids increase skin cell turn over from the normal 30 days to five or six days, minimizing clogged pores-the major factor in acne. Retinoids can also repair the DNA in cells damaged by the sun helping to even out skin pigmentation and clear pre-skin cancers. They can shrink oil glands and minimize the appearance of large pores. These effects can not be appreciated unless the medication is used on a consistent basis!    How do I use a retinoid?         After washing with a mild cleanser (Purpose, Aqua glycolic face cleanser, Cetaphil, Neutrogena deep cream cleanser), the skin should be moisturized with a non-retinol containing moisturizer such as Cerave PM. Then a thin layer of medication is applied to the forehead, nose cheeks, and chin (and around eyes if treating fine lines and wrinkles) at night. The amount of medication needed to cover the entire face should be no more than the size of a green pea. Irritation around the eyes can be treated with Vaseline at night.    What if my skin appears dry, red, and is peeling?          Retinoids do not cause dry skin but rather they cause the top layer of the skin the shed, giving an appearance of dry skin. In fact, new healthy skin  cells are replacing older, damaged cells on the surface. This usually occurs the first 2-4 weeks as the skin is adjusting to the medication. It is reasonable to use the medication every other night or even every 2 nights until your skin adjusts. You can use a MILD exfoliant to remove the peeling skin (Aveeno daily clarifying pads, Aqua glycolic face cream) and can apply a moisturizer throughout the day as needed. Retinoids come in a variety of strengths and vehicles and your doctor can find one best for you. If you cannot tolerate prescription strength retinoids, over the counter products with retinol may be beneficial. (Olay ProX wrinkle cream, WES deep wrinkle cream, Green Cream at [employer])    Will my skin be more sensitive in the sun?           You will need to use sunscreen with SPF 30 daily. Retinoids will cause the outermost layer of the skin to be thinner and thus more sensitive to ultraviolet rays. However, remember that over time, retinoids actually make the skin thicker by enhancing collagen deposition which protects the skin from sun damage.    When will I see results?            If you are using a retinoid for acne, you should see improvement in 6-8 weeks. Do not be alarmed if you find that your acne gets worse before it gets better-KEEP USING THE MEDICATION- this is a normal response and your acne will improve if you can stick with it.           If you are using the medication for anti-aging and skin dyspigmentation, you may see results in 3 months, but most effects are not visible until 6 months. Retinoids are clinically proven to reverse signs of aging, but only if used on a CONSTISTENT BASIS!             Remember that retinoids should not be used if you are pregnant.           Discontinue use 1 week prior to waxing, as skin is more likely to tear.

## 2019-04-11 ENCOUNTER — PATIENT MESSAGE (OUTPATIENT)
Dept: DERMATOLOGY | Facility: CLINIC | Age: 29
End: 2019-04-11

## 2019-04-12 ENCOUNTER — TELEPHONE (OUTPATIENT)
Dept: DERMATOLOGY | Facility: CLINIC | Age: 29
End: 2019-04-12

## 2019-04-12 NOTE — TELEPHONE ENCOUNTER
Called pt to inform her of what the pharmacy stated regarding her Rx change, received no answer. Left pt message.    RD

## 2019-04-12 NOTE — TELEPHONE ENCOUNTER
Spoke with pt's pharmacy regarding Rx. Asked pharmacy if pt's Ziana gel was covered. Pharmacy stated it's not covered but they can change it to compound and the pt will have to pay $45. Told pharmacy I will ask Jagruti and will give them a call back.    TYRESE

## 2019-04-15 ENCOUNTER — PATIENT MESSAGE (OUTPATIENT)
Dept: INTERNAL MEDICINE | Facility: CLINIC | Age: 29
End: 2019-04-15

## 2019-04-15 DIAGNOSIS — G43.109 MIGRAINE WITH AURA AND WITHOUT STATUS MIGRAINOSUS, NOT INTRACTABLE: Primary | ICD-10-CM

## 2019-04-15 RX ORDER — PREDNISONE 20 MG/1
40 TABLET ORAL DAILY
Qty: 10 TABLET | Refills: 0 | Status: SHIPPED | OUTPATIENT
Start: 2019-04-15 | End: 2019-04-21

## 2019-04-15 RX ORDER — DIHYDROERGOTAMINE MESYLATE 4 MG/ML
1 SPRAY NASAL
Qty: 8 ML | Refills: 5 | Status: SHIPPED | OUTPATIENT
Start: 2019-04-15 | End: 2019-04-24

## 2019-04-16 ENCOUNTER — PATIENT MESSAGE (OUTPATIENT)
Dept: INTERNAL MEDICINE | Facility: CLINIC | Age: 29
End: 2019-04-16

## 2019-04-16 ENCOUNTER — DOCUMENTATION ONLY (OUTPATIENT)
Dept: DERMATOLOGY | Facility: CLINIC | Age: 29
End: 2019-04-16

## 2019-04-24 ENCOUNTER — PATIENT MESSAGE (OUTPATIENT)
Dept: INTERNAL MEDICINE | Facility: CLINIC | Age: 29
End: 2019-04-24

## 2019-04-24 ENCOUNTER — OFFICE VISIT (OUTPATIENT)
Dept: INTERNAL MEDICINE | Facility: CLINIC | Age: 29
End: 2019-04-24
Payer: COMMERCIAL

## 2019-04-24 VITALS
BODY MASS INDEX: 23.36 KG/M2 | SYSTOLIC BLOOD PRESSURE: 99 MMHG | DIASTOLIC BLOOD PRESSURE: 84 MMHG | OXYGEN SATURATION: 99 % | WEIGHT: 131.81 LBS | HEIGHT: 63 IN | HEART RATE: 94 BPM

## 2019-04-24 DIAGNOSIS — K59.00 CONSTIPATION, UNSPECIFIED CONSTIPATION TYPE: ICD-10-CM

## 2019-04-24 DIAGNOSIS — G43.109 MIGRAINE WITH AURA AND WITHOUT STATUS MIGRAINOSUS, NOT INTRACTABLE: Primary | ICD-10-CM

## 2019-04-24 DIAGNOSIS — R20.2 PARESTHESIA: ICD-10-CM

## 2019-04-24 DIAGNOSIS — Z80.3 FAMILY HISTORY OF BREAST CANCER: ICD-10-CM

## 2019-04-24 DIAGNOSIS — R01.1 HEART MURMUR: ICD-10-CM

## 2019-04-24 DIAGNOSIS — F33.1 MODERATE EPISODE OF RECURRENT MAJOR DEPRESSIVE DISORDER: ICD-10-CM

## 2019-04-24 DIAGNOSIS — N94.6 DYSMENORRHEA: ICD-10-CM

## 2019-04-24 DIAGNOSIS — L50.9 URTICARIA: ICD-10-CM

## 2019-04-24 DIAGNOSIS — L70.0 ACNE VULGARIS: ICD-10-CM

## 2019-04-24 PROCEDURE — 3008F PR BODY MASS INDEX (BMI) DOCUMENTED: ICD-10-PCS | Mod: CPTII,S$GLB,, | Performed by: INTERNAL MEDICINE

## 2019-04-24 PROCEDURE — 99999 PR PBB SHADOW E&M-EST. PATIENT-LVL V: CPT | Mod: PBBFAC,,, | Performed by: INTERNAL MEDICINE

## 2019-04-24 PROCEDURE — 3008F BODY MASS INDEX DOCD: CPT | Mod: CPTII,S$GLB,, | Performed by: INTERNAL MEDICINE

## 2019-04-24 PROCEDURE — 99999 PR PBB SHADOW E&M-EST. PATIENT-LVL V: ICD-10-PCS | Mod: PBBFAC,,, | Performed by: INTERNAL MEDICINE

## 2019-04-24 PROCEDURE — 99215 PR OFFICE/OUTPT VISIT, EST, LEVL V, 40-54 MIN: ICD-10-PCS | Mod: S$GLB,,, | Performed by: INTERNAL MEDICINE

## 2019-04-24 PROCEDURE — 99215 OFFICE O/P EST HI 40 MIN: CPT | Mod: S$GLB,,, | Performed by: INTERNAL MEDICINE

## 2019-04-24 RX ORDER — NADOLOL 20 MG/1
20 TABLET ORAL DAILY
Qty: 30 TABLET | Refills: 3 | Status: SHIPPED | OUTPATIENT
Start: 2019-04-24 | End: 2019-06-04

## 2019-04-24 RX ORDER — AMITRIPTYLINE HYDROCHLORIDE 10 MG/1
10 TABLET, FILM COATED ORAL NIGHTLY
Qty: 30 TABLET | Refills: 1 | Status: CANCELLED | OUTPATIENT
Start: 2019-04-24 | End: 2020-04-23

## 2019-04-24 RX ORDER — DOCUSATE SODIUM 100 MG/1
100 CAPSULE, LIQUID FILLED ORAL 2 TIMES DAILY PRN
Qty: 60 CAPSULE | Refills: 5 | Status: SHIPPED | OUTPATIENT
Start: 2019-04-24 | End: 2019-05-14 | Stop reason: CLARIF

## 2019-04-24 RX ORDER — BUPROPION HYDROCHLORIDE 150 MG/1
150 TABLET ORAL DAILY
Qty: 30 TABLET | Refills: 3 | Status: SHIPPED | OUTPATIENT
Start: 2019-04-24 | End: 2019-07-19 | Stop reason: SDUPTHER

## 2019-04-24 RX ORDER — POLYETHYLENE GLYCOL 3350 17 G/17G
17-34 POWDER, FOR SOLUTION ORAL DAILY PRN
Qty: 30 EACH | Refills: 5 | Status: SHIPPED | OUTPATIENT
Start: 2019-04-24 | End: 2019-06-04

## 2019-04-24 NOTE — PROGRESS NOTES
Subjective:       Patient ID: Delaney Arechiga is a 29 y.o. female who  has a past medical history of Abnormal Pap smear of cervix (2012), Family history of breast cancer, Heart murmur (3/25/2019), and Urticaria.    Chief Complaint: Follow-up (migranes); Dysmenorrhea; and Depression     HPI    History was obtained from the patient and supplemented through chart review  Has seen OBGYN for suprapubic cramps and Dermatology for acne vulgaris.    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    Migraine with aura:   The patient complains of bitemporal, behind both eyes, bioccipatal headaches since 14 yoa.  She ran out of Imitrex 1 year ago.  The headache used to always last for days, but never 7 days.  Over the past few months after she started her new job, it has been lasting 7 days; she will be migraine free for a few days, it will then return. It is progressively worsening in duration.  She tends to not have migraines on the weekends, but can also have migraines lasting 7 days.  Imitrex helped in the past (requests brand name Imitrex due to family history of anaphylaxis to sumatriptan).  Has a strong family history of migraines.  Her mom is allergic to Botox injections, so she is hesitant to try Botox.      It is associated with photophobia, phonophobia and nausea.  Tylenol, NSAIDs have not helped in the past.  Triggers include lack of sleep and stress, fluorescent lights.  There is aura with flashing lights.  On the IUD.  Takes Zyrtec daily for urticaria.  Denies rhinorrhea, but with eye tearing not always associated with HA.  Her father also has cluster headache.    Unfortunately was unable to fill Imitrex (she does not want generic) due to insurance despite appeals because it was $1000.  She received another message that a review was in process.  Dihydroergotamine nasal spray was also very expensive despite being a tier 1 medication; it was $800 for 8 sprays, so she never filled it.  Was still having significant  headaches nearly daily and was not taking any abortive medicines, including OTC.  Therefore, was started on a trial of prednisone 40 for 5 days, with complete resolution of headaches.  She has had no headaches since then.    She also has depression, but does not want to try amitriptyline due to constipation.  Has had significant side effects to many medications in the past.    Depression:  Has been present for her whole life.  Has been on antidepressants since 14 yoa.  Has tried multiple antidepressants, but SSRIs lose their effect afer 6 months.  Effexor caused severe acid reflux, causing her to miss work.  She usually goes back to taking Wellbutrin which helps with the least amount of side effects.    Constipation:  Reports good hydration.  TSH wnl.    LLE paresthesia:  Has seen a Chiropractor without relief.  In addition, also has feet numbness after sitting.  Denies dietary restrictions.  A1c and B12 normal.  This resolved after prolonged walking.    Acne vulgaris:    Follows with Dermatology.  Was prescribed Aldactone and clindamycin/tretinoin gel qHS.  Does apply sunscreen that is included in her makeup.    Dysmenorrhea:    Has excruciating suprapubic cramps every month to the point that she would like a hysterectomy.  Is on the IUD and has had lighter periods.  Has established with OBGYN.  Concern for endometriosis.  Considering diagnostic laparoscopy.    Family history of breast cancer:  MGM, 2 maternal aunts with breast cancer; the youngest was that age 35. PGM with ovarian cancer.  No one in her family has been checked for BRCA mutation.    Urticaria:  Takes Claritin daily for hives.  Has not seen an allergist due to cost.    Heart Murmur:  Murmur has been present since childhood.  Has sharp chest pain about once a week lasting for about a second without SOB, ANN, lightheadedness, dizziness.  Is associated with caffeine intake.    Review of Systems   Constitutional: Positive for activity change. Negative  "for unexpected weight change.   HENT: Negative for hearing loss, rhinorrhea and trouble swallowing.    Eyes: Negative for discharge and visual disturbance.   Respiratory: Negative for chest tightness and wheezing.    Cardiovascular: Negative for chest pain and palpitations.   Gastrointestinal: Negative for blood in stool, constipation, diarrhea and vomiting.   Endocrine: Negative for polydipsia and polyuria.   Genitourinary: Positive for menstrual problem. Negative for difficulty urinating, dysuria and hematuria.   Musculoskeletal: Negative for arthralgias, joint swelling and neck pain.   Skin: Positive for rash. Negative for wound.        Chronic urticaria   Neurological: Positive for headaches. Negative for weakness.   Psychiatric/Behavioral: Positive for dysphoric mood. Negative for confusion, self-injury and suicidal ideas.       I personally reviewed Past Medical History, Past Surgical History, Social History, and Family History.    Objective:      Vitals:    04/24/19 1049   BP: 99/84   Pulse: 94   SpO2: 99%   Weight: 59.8 kg (131 lb 13.4 oz)   Height: 5' 3" (1.6 m)      Physical Exam   Constitutional: She appears well-developed and well-nourished. No distress.   HENT:   Head: Normocephalic and atraumatic.   Nose: Nose normal.   Mouth/Throat: Oropharynx is clear and moist. No oropharyngeal exudate.   Eyes: Pupils are equal, round, and reactive to light. Conjunctivae and EOM are normal. Right eye exhibits no discharge. Left eye exhibits no discharge. No scleral icterus.   Neck: Neck supple. No tracheal deviation present. No thyromegaly present.   Cardiovascular: Normal rate, regular rhythm and intact distal pulses.   No murmur heard.  Pulmonary/Chest: Effort normal and breath sounds normal. No respiratory distress. She has no wheezes.   Abdominal: Soft. Bowel sounds are normal. There is no tenderness.   Musculoskeletal: She exhibits no edema or deformity.   Lymphadenopathy:     She has no cervical adenopathy. "   Neurological: She is alert. No cranial nerve deficit. Gait normal.   Skin: Skin is warm and dry. Capillary refill takes less than 2 seconds. No rash noted. She is not diaphoretic. No erythema.   Psychiatric: She has a normal mood and affect. Her behavior is normal.         Lab Results   Component Value Date    WBC 5.04 03/28/2019    HGB 14.5 03/28/2019    HCT 41.6 03/28/2019     03/28/2019    CHOL 148 03/28/2019    TRIG 82 03/28/2019    HDL 59 03/28/2019    ALT 12 03/28/2019    AST 14 03/28/2019     03/28/2019    K 3.6 03/28/2019     03/28/2019    CREATININE 0.7 03/28/2019    BUN 6 03/28/2019    CO2 26 03/28/2019    TSH 0.891 03/28/2019    HGBA1C 4.4 03/28/2019       The ASCVD Risk score ([place] ENRIQUE Jr., et al., 2013) failed to calculate for the following reasons:    The 2013 ASCVD risk score is only valid for ages 40 to 79    (Imaging have been independently reviewed)  None    Assessment:       1. Migraine with aura and without status migrainosus, not intractable    2. Moderate episode of recurrent major depressive disorder    3. Constipation, unspecified constipation type    4. Paresthesia    5. Acne vulgaris    6. Dysmenorrhea    7. Family history of breast cancer    8. Urticaria    9. Heart murmur          Plan:       Delaney was seen today for follow-up, dysmenorrhea and depression.    Diagnoses and all orders for this visit:    Migraine with aura and without status migrainosus, not intractable  Comments:  Imitrex under insurance review. Very limited abortive options. Start ppx Nadolol. Will see Neuro. Can try Prednisone again, but is not a long term option.  Orders:  -     nadolol (CORGARD) 20 MG tablet; Take 1 tablet (20 mg total) by mouth once daily.    Moderate episode of recurrent major depressive disorder  Comments:  Many SE to SSRIs, Effexor. Does not want Elavil for HA ppx 2/2 constipation. Restart low dose Wellbutrin. Discussed SE, ED return precautions.  Orders:  -     buPROPion  (WELLBUTRIN XL) 150 MG TB24 tablet; Take 1 tablet (150 mg total) by mouth once daily.    Constipation, unspecified constipation type  Comments:  TSH wnl. Encouraged hydration. Miralax PRN. Colace BID PRN.  Orders:  -     docusate sodium (COLACE) 100 MG capsule; Take 1 capsule (100 mg total) by mouth 2 (two) times daily as needed for Constipation.  -     polyethylene glycol (GLYCOLAX) 17 gram PwPk; Take 17-34 g by mouth daily as needed (constipation). 17 g dissolved in 8 oz of water once daily and titrate up or down (max 34 g daily) to effect.    Paresthesia  Comments:  A1c, B12 wnl.  Resolved with walking.    Acne vulgaris  Comments:  Follows with Derm. On Aldactone, clinda/tretinoin gel qHS. Advised sunscreen.    Dysmenorrhea  Comments:  On IUD.  Follows with OBGYN. Considering diagnostic laparoscopy for endometriosis.    Family history of breast cancer  Comments:  Moderate risk. 2nd degree relatives with early breast ca. Refer to genetics for BRCA testing/counseling. Otherwise, start MMG at age 40.  Orders:  -     Ambulatory Referral to Genetics    Urticaria  Comments:  On Claritin daily.  No acute issues.    Heart murmur  Comments:  Since childhood.  Very transient sharp CP without other associated symptoms. Assoc with caffeine. If more persistent or increased duration, will pursue TTE    Other orders  -     Cancel: Ambulatory Referral to Neurology  -     Cancel: amitriptyline (ELAVIL) 10 MG tablet; Take 1 tablet (10 mg total) by mouth every evening.         I have spent 40 minutes face to face with the patient, at least of 50% of which was spent counseling on migraines and depression.    Notification of Lab Results: [facility]    Side effects of medication(s) were discussed in detail and patient voiced understanding.  Patient will call back for any issues or complications.     RTC in 3 month(s) or sooner PRN for migraine with aura, depression.

## 2019-04-25 ENCOUNTER — OFFICE VISIT (OUTPATIENT)
Dept: OBSTETRICS AND GYNECOLOGY | Facility: CLINIC | Age: 29
End: 2019-04-25
Payer: COMMERCIAL

## 2019-04-25 ENCOUNTER — TELEPHONE (OUTPATIENT)
Dept: OBSTETRICS AND GYNECOLOGY | Facility: CLINIC | Age: 29
End: 2019-04-25

## 2019-04-25 VITALS
WEIGHT: 132.94 LBS | SYSTOLIC BLOOD PRESSURE: 102 MMHG | HEIGHT: 63 IN | BODY MASS INDEX: 23.55 KG/M2 | DIASTOLIC BLOOD PRESSURE: 66 MMHG

## 2019-04-25 DIAGNOSIS — N94.6 DYSMENORRHEA: ICD-10-CM

## 2019-04-25 DIAGNOSIS — R10.2 FEMALE PELVIC PAIN: Primary | ICD-10-CM

## 2019-04-25 PROCEDURE — 99999 PR PBB SHADOW E&M-EST. PATIENT-LVL IV: ICD-10-PCS | Mod: PBBFAC,,, | Performed by: OBSTETRICS & GYNECOLOGY

## 2019-04-25 PROCEDURE — 99999 PR PBB SHADOW E&M-EST. PATIENT-LVL IV: CPT | Mod: PBBFAC,,, | Performed by: OBSTETRICS & GYNECOLOGY

## 2019-04-25 PROCEDURE — 99244 OFF/OP CNSLTJ NEW/EST MOD 40: CPT | Mod: 57,S$GLB,, | Performed by: OBSTETRICS & GYNECOLOGY

## 2019-04-25 PROCEDURE — 99244 PR OFFICE CONSULTATION,LEVEL IV: ICD-10-PCS | Mod: 57,S$GLB,, | Performed by: OBSTETRICS & GYNECOLOGY

## 2019-04-25 RX ORDER — NORETHINDRONE 5 MG/1
5 TABLET ORAL DAILY
Qty: 30 TABLET | Refills: 11 | Status: SHIPPED | OUTPATIENT
Start: 2019-04-25 | End: 2019-07-22 | Stop reason: SDUPTHER

## 2019-04-25 NOTE — LETTER
April 25, 2019      Flor Nj MD  [address]  [facility] [address]           [facility] [id number] [facility] [address] [facility] [address]  [address] [facility] [address]  Phone: [phone]          Patient: Delaney Arechiga   MR Number: [id number]   YOB: 1990   Date of Visit: 4/25/2019       Dear Dr. Flor Nj:    Thank you for referring Delaney Arechiga to me for evaluation. Attached you will find relevant portions of my assessment and plan of care.    If you have questions, please do not hesitate to call me. I look forward to following Delaney Arechiga along with you.    Sincerely,    Jeet Thompson MD    Enclosure  CC:  No Recipients    If you would like to receive this communication electronically, please contact [email or web address] or [phone] to request more information on [employer] access.    For providers and/or their staff who would like to refer a patient to [facility], please contact us through our one-stop-shop provider referral line, [facility] [place], at [phone].    If you feel you have received this communication in error or would no longer like to receive these types of communications, please e-mail [email or web address]

## 2019-04-25 NOTE — PROGRESS NOTES
Subjective:       Patient ID: Delaney Arechiga is a 29 y.o. female.    Chief Complaint:  Consult (endometriosis)      History of Present Illness  Pt is 29 y.o. With Patient's last menstrual period was 04/02/2019 (approximate). Here in consultation from Dr. Nj to discuss her pelvic pain.    Patient has a long history of pelvic pain started when she was 14 years old and had severe dysmenorrhea.  She had used anti-inflammatory medications at that time which did not help.  They started her on birth control pills which offered some relief.  She then went through a series of Nexplanon use and currently is on the Mirena IUD.  She states that over the course of time her pain is of all of to where she now has pain all the time.  It is worse when she is ovulating on her menstrual cycle.  When she has intercourse with men, she has pain with deep penetration.  It is to the point where Advil and Tylenol offer no relief.    Of note, when she was switching between the Nexplanon and IUD, she had 1 month where she had a cycle.  It was the worst pain she experienced in quite some time.    Of note, she does feel like her pain is in 1 particular spot.  It is relieved while sleeping.  She also has pain with bowel movements.  She has a bowel movement every few days.  She did have a recent colonoscopy which found a polyp.    Patient is frustrated with the pain and wants aggressive therapy.  Patient has no intention of trying to get pregnant in the future.  Patient even states you could do a hysterectomy if that would help    Pelvic Pain   The patient's primary symptoms include pelvic pain. The patient's pertinent negatives include no vaginal discharge. This is a chronic problem. The current episode started more than 1 year ago. The problem occurs intermittently. The problem has been gradually worsening. The pain is severe. She is not pregnant. Associated symptoms include abdominal pain, back pain, chills, constipation, diarrhea,  headaches, nausea and painful intercourse. Pertinent negatives include no anorexia, discolored urine, dysuria, fever, flank pain, frequency, hematuria, rash, urgency or vomiting. The symptoms are aggravated by menstrual cycle. She has tried acetaminophen, heating pad, NSAIDs and warm bath for the symptoms. The treatment provided mild relief. She is not sexually active. No, her partner does not have an STD. She uses an IUD for contraception. Her menstrual history has been regular. Her past medical history is significant for herpes simplex, menorrhagia and an STD. There is no history of an abdominal surgery, a  section, an ectopic pregnancy, a gynecological surgery, metrorrhagia, miscarriage, ovarian cysts, perineal abscess, PID, a terminated pregnancy or vaginosis.       GYN & OB History  Patient's last menstrual period was 2019 (approximate).   Date of Last Pap: 4/3/2019    OB History    Para Term  AB Living   0 0 0 0 0 0   SAB TAB Ectopic Multiple Live Births   0 0 0 0 0       Review of Systems  Review of Systems   Constitutional: Positive for chills. Negative for activity change, appetite change, fatigue and fever.   HENT: Negative.  Negative for tinnitus.    Eyes: Negative.    Respiratory: Negative for cough and shortness of breath.    Cardiovascular: Negative for chest pain and palpitations.   Gastrointestinal: Positive for abdominal pain, constipation, diarrhea and nausea. Negative for anorexia, blood in stool and vomiting.   Endocrine: Negative.  Negative for hot flashes.   Genitourinary: Positive for menorrhagia and pelvic pain. Negative for dysmenorrhea, dyspareunia, dysuria, flank pain, frequency, hematuria, menstrual problem, urgency, vaginal discharge, urinary incontinence and postcoital bleeding.   Musculoskeletal: Positive for back pain. Negative for joint swelling.   Integumentary:  Negative for rash, breast mass, nipple discharge and breast skin changes.   Neurological:  Positive for headaches.   Hematological: Negative.  Does not bruise/bleed easily.   Psychiatric/Behavioral: The patient is not nervous/anxious.    Breast: negative.  Negative for mass, nipple discharge and skin changes          Objective:    Physical Exam:   Constitutional: She is oriented to person, place, and time. She appears well-developed and well-nourished. No distress.    HENT:   Head: Normocephalic and atraumatic.    Eyes: Pupils are equal, round, and reactive to light. Conjunctivae and lids are normal.    Neck: Normal range of motion and full passive range of motion without pain. Neck supple.    Cardiovascular: Normal rate and regular rhythm.  Exam reveals no edema.     Pulmonary/Chest: Effort normal and breath sounds normal. She has no wheezes.        Abdominal: Soft. Normal appearance and bowel sounds are normal. She exhibits no distension. There is no tenderness. There is no rebound and no guarding.     Genitourinary: Vagina normal and uterus normal. Pelvic exam was performed with patient supine. There is no tenderness or lesion on the right labia. There is no tenderness or lesion on the left labia. Uterus is not deviated, not fixed and not hosting fibroids. Cervix is normal. Right adnexum displays no mass and no tenderness. Left adnexum displays no mass and no tenderness. No rectocele, cystocele or unspecified prolapse of vaginal walls in the vagina. No vaginal discharge found. Labial bartholins normal.Cervix exhibits no motion tenderness and no discharge.   Genitourinary Comments: No anterior pain.  Significant pain near right ischial spine and pudendal area. Point tenderness with enhanced pelvic floor tone.  Has pain in left levator complex.  No CMT  No adnexal fullness.           Musculoskeletal: Normal range of motion and moves all extremeties.       Neurological: She is alert and oriented to person, place, and time. She has normal strength.    Skin: Skin is warm and dry.    Psychiatric: She has a  normal mood and affect. Her speech is normal and behavior is normal. Thought content normal. Her mood appears not anxious. She does not exhibit a depressed mood. She expresses no suicidal plans and no homicidal plans.          Assessment:        1. Female pelvic pain    2. Dysmenorrhea                Plan:      Delaney was seen today for consult.    Diagnoses and all orders for this visit:    Female pelvic pain  -     Ambulatory consult to Physical Therapy  -     norethindrone (AYGESTIN) 5 mg Tab; Take 1 tablet (5 mg total) by mouth once daily.  We had a long discussion today regarding her pelvic pain. We talked about how surgery would not be unreasonable given her point tenderness and lack of improvement diagnosis.  Her clinical exam is suspicious for endometriosis.  But she now has a component of pelvic for spasm that would benefit from pelvic for physical therapy.  We also discussed the use of gabapentin.  She has used that in the past and said that it made her very tired.    We will start with the diagnostic laparoscopy and excision of endometriosis.  After we confirmed her diagnosis, we can discuss longer term treatments such as orlissa.  I spent approx   Dysmenorrhea  -     Ambulatory consult to Physical Therapy  -     norethindrone (AYGESTIN) 5 mg Tab; Take 1 tablet (5 mg total) by mouth once daily.    Other orders  -     Cancel: Case Request Operating Room: LAPAROSCOPY, DIAGNOSTIC

## 2019-04-26 ENCOUNTER — TELEPHONE (OUTPATIENT)
Dept: OBSTETRICS AND GYNECOLOGY | Facility: CLINIC | Age: 29
End: 2019-04-26

## 2019-04-29 ENCOUNTER — TELEPHONE (OUTPATIENT)
Dept: FAMILY MEDICINE | Facility: CLINIC | Age: 29
End: 2019-04-29

## 2019-04-29 NOTE — TELEPHONE ENCOUNTER
----- Message from Carissa Lin sent at 4/29/2019 12:36 PM CDT -----  Contact: pt   Name of Who is Calling: JANEY GILBERT [[id number]]       What is the request in detail: Patient is requesting a call from staff in regards to scheduling an appointment for her pre-op and pre admit visit  ..... Please contact to further discuss and advise.     Can the clinic reply by [facility]: yes     What Number to Call Back if not in [facility]: [phone]

## 2019-04-30 ENCOUNTER — LAB VISIT (OUTPATIENT)
Dept: LAB | Facility: HOSPITAL | Age: 29
End: 2019-04-30
Attending: PSYCHIATRY & NEUROLOGY
Payer: COMMERCIAL

## 2019-04-30 ENCOUNTER — OFFICE VISIT (OUTPATIENT)
Dept: NEUROLOGY | Facility: CLINIC | Age: 29
End: 2019-04-30
Payer: COMMERCIAL

## 2019-04-30 VITALS
SYSTOLIC BLOOD PRESSURE: 131 MMHG | WEIGHT: 130.75 LBS | BODY MASS INDEX: 23.16 KG/M2 | DIASTOLIC BLOOD PRESSURE: 90 MMHG | HEART RATE: 84 BPM

## 2019-04-30 DIAGNOSIS — G43.109 MIGRAINE WITH AURA AND WITHOUT STATUS MIGRAINOSUS, NOT INTRACTABLE: ICD-10-CM

## 2019-04-30 DIAGNOSIS — G43.109 MIGRAINE WITH AURA AND WITHOUT STATUS MIGRAINOSUS, NOT INTRACTABLE: Primary | ICD-10-CM

## 2019-04-30 LAB
CREAT SERPL-MCNC: 0.9 MG/DL (ref 0.5–1.4)
EST. GFR  (AFRICAN AMERICAN): >60 ML/MIN/1.73 M^2
EST. GFR  (NON AFRICAN AMERICAN): >60 ML/MIN/1.73 M^2

## 2019-04-30 PROCEDURE — 36415 COLL VENOUS BLD VENIPUNCTURE: CPT

## 2019-04-30 PROCEDURE — 99999 PR PBB SHADOW E&M-EST. PATIENT-LVL III: CPT | Mod: PBBFAC,,, | Performed by: PSYCHIATRY & NEUROLOGY

## 2019-04-30 PROCEDURE — 82565 ASSAY OF CREATININE: CPT

## 2019-04-30 PROCEDURE — 99204 PR OFFICE/OUTPT VISIT, NEW, LEVL IV, 45-59 MIN: ICD-10-PCS | Mod: S$GLB,,, | Performed by: PSYCHIATRY & NEUROLOGY

## 2019-04-30 PROCEDURE — 3008F PR BODY MASS INDEX (BMI) DOCUMENTED: ICD-10-PCS | Mod: CPTII,S$GLB,, | Performed by: PSYCHIATRY & NEUROLOGY

## 2019-04-30 PROCEDURE — 99204 OFFICE O/P NEW MOD 45 MIN: CPT | Mod: S$GLB,,, | Performed by: PSYCHIATRY & NEUROLOGY

## 2019-04-30 PROCEDURE — 99999 PR PBB SHADOW E&M-EST. PATIENT-LVL III: ICD-10-PCS | Mod: PBBFAC,,, | Performed by: PSYCHIATRY & NEUROLOGY

## 2019-04-30 PROCEDURE — 3008F BODY MASS INDEX DOCD: CPT | Mod: CPTII,S$GLB,, | Performed by: PSYCHIATRY & NEUROLOGY

## 2019-04-30 RX ORDER — RIZATRIPTAN BENZOATE 5 MG/1
5 TABLET ORAL
Qty: 12 TABLET | Refills: 0 | Status: SHIPPED | OUTPATIENT
Start: 2019-04-30 | End: 2019-06-04

## 2019-05-14 ENCOUNTER — ANESTHESIA EVENT (OUTPATIENT)
Dept: SURGERY | Facility: OTHER | Age: 29
End: 2019-05-14
Payer: COMMERCIAL

## 2019-05-14 ENCOUNTER — OFFICE VISIT (OUTPATIENT)
Dept: OBSTETRICS AND GYNECOLOGY | Facility: CLINIC | Age: 29
End: 2019-05-14
Payer: COMMERCIAL

## 2019-05-14 ENCOUNTER — HOSPITAL ENCOUNTER (OUTPATIENT)
Dept: PREADMISSION TESTING | Facility: OTHER | Age: 29
Discharge: HOME | End: 2019-05-14
Attending: OBSTETRICS & GYNECOLOGY
Payer: COMMERCIAL

## 2019-05-14 VITALS
WEIGHT: 131 LBS | DIASTOLIC BLOOD PRESSURE: 92 MMHG | OXYGEN SATURATION: 100 % | TEMPERATURE: 99 F | SYSTOLIC BLOOD PRESSURE: 122 MMHG | HEIGHT: 63 IN | HEART RATE: 75 BPM | BODY MASS INDEX: 23.21 KG/M2

## 2019-05-14 VITALS
BODY MASS INDEX: 23.36 KG/M2 | HEIGHT: 63 IN | SYSTOLIC BLOOD PRESSURE: 112 MMHG | DIASTOLIC BLOOD PRESSURE: 86 MMHG | WEIGHT: 131.81 LBS

## 2019-05-14 DIAGNOSIS — G43.719 INTRACTABLE CHRONIC MIGRAINE WITHOUT AURA AND WITHOUT STATUS MIGRAINOSUS: ICD-10-CM

## 2019-05-14 DIAGNOSIS — R10.31 CHRONIC RLQ PAIN: Primary | ICD-10-CM

## 2019-05-14 DIAGNOSIS — G89.29 CHRONIC RLQ PAIN: Primary | ICD-10-CM

## 2019-05-14 LAB
ABO + RH BLD: NORMAL
BLD GP AB SCN CELLS X3 SERPL QL: NORMAL

## 2019-05-14 PROCEDURE — 99499 UNLISTED E&M SERVICE: CPT | Mod: S$GLB,,, | Performed by: OBSTETRICS & GYNECOLOGY

## 2019-05-14 PROCEDURE — 99999 PR PBB SHADOW E&M-EST. PATIENT-LVL III: CPT | Mod: PBBFAC,,, | Performed by: OBSTETRICS & GYNECOLOGY

## 2019-05-14 PROCEDURE — 36415 COLL VENOUS BLD VENIPUNCTURE: CPT

## 2019-05-14 PROCEDURE — 99999 PR PBB SHADOW E&M-EST. PATIENT-LVL III: ICD-10-PCS | Mod: PBBFAC,,, | Performed by: OBSTETRICS & GYNECOLOGY

## 2019-05-14 PROCEDURE — 86850 RBC ANTIBODY SCREEN: CPT

## 2019-05-14 PROCEDURE — 99499 NO LOS: ICD-10-PCS | Mod: S$GLB,,, | Performed by: OBSTETRICS & GYNECOLOGY

## 2019-05-14 RX ORDER — BUTALBITAL, ACETAMINOPHEN AND CAFFEINE 50; 325; 40 MG/1; MG/1; MG/1
1 TABLET ORAL EVERY 4 HOURS PRN
Qty: 20 TABLET | Refills: 0 | Status: SHIPPED | OUTPATIENT
Start: 2019-05-14 | End: 2019-06-13

## 2019-05-14 RX ORDER — MIDAZOLAM HYDROCHLORIDE 1 MG/ML
2 INJECTION INTRAMUSCULAR; INTRAVENOUS
Status: CANCELLED | OUTPATIENT
Start: 2019-05-14 | End: 2019-05-14

## 2019-05-14 RX ORDER — LIDOCAINE HYDROCHLORIDE 10 MG/ML
0.5 INJECTION, SOLUTION EPIDURAL; INFILTRATION; INTRACAUDAL; PERINEURAL ONCE
Status: CANCELLED | OUTPATIENT
Start: 2019-05-14 | End: 2019-05-14

## 2019-05-14 RX ORDER — PREGABALIN 75 MG/1
75 CAPSULE ORAL
Status: CANCELLED | OUTPATIENT
Start: 2019-05-14 | End: 2019-05-14

## 2019-05-14 RX ORDER — SODIUM CHLORIDE, SODIUM LACTATE, POTASSIUM CHLORIDE, CALCIUM CHLORIDE 600; 310; 30; 20 MG/100ML; MG/100ML; MG/100ML; MG/100ML
INJECTION, SOLUTION INTRAVENOUS CONTINUOUS
Status: CANCELLED | OUTPATIENT
Start: 2019-05-14

## 2019-05-14 RX ORDER — CELECOXIB 200 MG/1
400 CAPSULE ORAL
Status: CANCELLED | OUTPATIENT
Start: 2019-05-14 | End: 2019-05-14

## 2019-05-14 RX ORDER — ACETAMINOPHEN 500 MG
1000 TABLET ORAL
Status: CANCELLED | OUTPATIENT
Start: 2019-05-14 | End: 2019-05-14

## 2019-05-14 NOTE — ANESTHESIA PREPROCEDURE EVALUATION
05/14/2019  Delaney Arechiga is a 29 y.o., female.    Anesthesia Evaluation    I have reviewed the Patient Summary Reports.    I have reviewed the Nursing Notes.   I have reviewed the Medications.     Review of Systems  Anesthesia Hx:  Rcn to morphine!! History of prior surgery of interest to airway management or planning: Denies Family Hx of Anesthesia complications.   Denies Personal Hx of Anesthesia complications.   Social:  Non-Smoker    Hematology/Oncology:  Hematology Normal   Oncology Normal     EENT/Dental:EENT/Dental Normal   Cardiovascular:   Exercise tolerance: good    Pulmonary:  Pulmonary Normal    Renal/:  Renal/ Normal     Hepatic/GI:  Hepatic/GI Normal    Musculoskeletal:  Musculoskeletal Normal    Neurological:   Headaches    Endocrine:  Endocrine Normal    Dermatological:  Skin Normal    Psych:   Psychiatric History          Physical Exam  General:  Well nourished    Airway/Jaw/Neck:  Airway Findings: Mouth Opening: Normal Tongue: Normal  General Airway Assessment: Adult  Mallampati: I      Dental:  Dental Findings: In tact        Mental Status:  Mental Status Findings:  Anxious         Anesthesia Plan  Type of Anesthesia, risks & benefits discussed:  Anesthesia Type:  general  Patient's Preference:   Intra-op Monitoring Plan: standard ASA monitors  Intra-op Monitoring Plan Comments:   Post Op Pain Control Plan: multimodal analgesia  Post Op Pain Control Plan Comments:   Induction:   IV  Beta Blocker:         Informed Consent: Patient understands risks and agrees with Anesthesia plan.  Questions answered. Anesthesia consent signed with patient.  ASA Score: 2     Day of Surgery Review of History & Physical:    H&P update referred to the surgeon.     Anesthesia Plan Notes: Requests multimodal technique to limit post op narcotics,use Ketamine early in proceedure        Ready For  Surgery From Anesthesia Perspective.

## 2019-05-14 NOTE — DISCHARGE INSTRUCTIONS
PRE-ADMIT TESTING -  [phone]    [address]  [facility]          Your surgery has been scheduled at [facility]. We are pleased to have the opportunity to serve you. For Further Information please call [phone].    On the day of surgery please report to the Information Desk on the 1st floor.    · CONTACT YOUR PHYSICIAN'S OFFICE THE DAY PRIOR TO YOUR SURGERY TO OBTAIN YOUR ARRIVAL TIME.     · The evening before surgery do not eat anything after 9 p.m. ( this includes hard candy, chewing gum and mints).  You may only have GATORADE, POWERADE AND WATER  from 9 p.m. until you leave your home.   DO NOT DRINK ANY LIQUIDS ON THE WAY TO THE HOSPITAL.      SPECIAL MEDICATION INSTRUCTIONS: TAKE medications checked off by the Anesthesiologist on your Medication List.    Angiogram Patients: Take medications as instructed by your physician, including aspirin.     Surgery Patients:    If you take ASPIRIN - Your PHYSICIAN/SURGEON will need to inform you IF/OR when you need to stop taking aspirin prior to your surgery.     Do Not take any medications containing IBUPROFEN.  Do Not Wear any make-up or dark nail polish   (especially eye make-up) to surgery. If you come to surgery with makeup on you will be required to remove the makeup or nail polish.    Do not shave your surgical area at least 5 days prior to your surgery. The surgical prep will be performed at the hospital according to Infection Control regulations.    Leave all valuables at home.   Do Not wear any jewelry or watches, including any metal in body piercings. Jewelry must be removed prior to coming to the hospital.  There is a possibility that rings that are unable to be removed may be cut off if they are on the surgical extremity.    Contact Lens must be removed before surgery. Either do not wear the contact lens or bring a case and solution for storage.  Please bring a container for eyeglasses or dentures as required.  Bring  any paperwork your physician has provided, such as consent forms,  history and physicals, doctor's orders, etc.   Bring comfortable clothes that are loose fitting to wear upon discharge. Take into consideration the type of surgery being performed.  Maintain your diet as advised per your physician the day prior to surgery.      Adequate rest the night before surgery is advised.   Park in the Parking lot behind the hospital or in the [facility] across the street from the parking lot. Parking is complimentary.  If you will be discharged the same day as your procedure, please arrange for a responsible adult to drive you home or to accompany you if traveling by taxi.   YOU WILL NOT BE PERMITTED TO DRIVE OR TO LEAVE THE HOSPITAL ALONE AFTER SURGERY.   It is strongly recommended that you arrange for someone to remain with you for the first 24 hrs following your surgery.       Thank you for your cooperation.  The Staff of [facility].                Bathing Instructions with Hibiclens     Shower the evening before and morning of your procedure with Hibiclens:   Wash your face with water and your regular face wash/soap   Apply Hibiclens directly on your skin or on a wet washcloth and wash gently. When showering: Move away from the shower stream when applying Hibiclens to avoid rinsing off too soon.   Rinse thoroughly with warm water   Do not dilute Hibiclens         Dry off as usual, do not use any deodorant, powder, body lotions, perfume, after shave or cologne.

## 2019-05-14 NOTE — PROGRESS NOTES
Subjective:       Patient ID: Delaney Arechiga is a 29 y.o. female.    Chief Complaint:  Pre-op Exam      History of Present Illness  HPI  Pt here in preop for Dx LSC, possible excision of endometriosis.  Pain seems to be focused on right side.    GYN & OB History  Patient's last menstrual period was 2019 (approximate).   Date of Last Pap: 4/3/2019    OB History    Para Term  AB Living   0 0 0 0 0 0   SAB TAB Ectopic Multiple Live Births   0 0 0 0 0       Review of Systems  Review of Systems   Constitutional: Negative for activity change, appetite change and fatigue.   HENT: Negative.  Negative for tinnitus.    Eyes: Negative.    Respiratory: Negative for cough and shortness of breath.    Cardiovascular: Negative for chest pain and palpitations.   Gastrointestinal: Negative.  Negative for abdominal pain, blood in stool, constipation, diarrhea and nausea.   Endocrine: Negative.  Negative for hot flashes.   Genitourinary: Positive for pelvic pain. Negative for dysmenorrhea, dyspareunia, dysuria, frequency, menstrual problem, vaginal discharge, urinary incontinence and postcoital bleeding.   Musculoskeletal: Negative for back pain and joint swelling.   Integumentary:  Negative for rash, breast mass, nipple discharge and breast skin changes.   Neurological: Negative.  Negative for headaches.   Hematological: Negative.  Does not bruise/bleed easily.   Psychiatric/Behavioral: The patient is not nervous/anxious.    Breast: negative.  Negative for mass, nipple discharge and skin changes          Objective:    Physical Exam:   Constitutional: She is oriented to person, place, and time. She appears well-developed and well-nourished. No distress.    HENT:   Head: Normocephalic and atraumatic.    Eyes: Pupils are equal, round, and reactive to light. Conjunctivae and lids are normal.    Neck: Normal range of motion and full passive range of motion without pain. Neck supple.    Cardiovascular: Normal  rate and regular rhythm.  Exam reveals no edema.     Pulmonary/Chest: Effort normal and breath sounds normal. She has no wheezes.        Abdominal: Soft. Normal appearance and bowel sounds are normal. She exhibits no distension. There is no tenderness. There is no rebound and no guarding.             Musculoskeletal: Normal range of motion and moves all extremeties.       Neurological: She is alert and oriented to person, place, and time. She has normal strength.    Skin: Skin is warm and dry.    Psychiatric: She has a normal mood and affect. Her speech is normal and behavior is normal. Thought content normal. Her mood appears not anxious. She does not exhibit a depressed mood. She expresses no suicidal plans and no homicidal plans.          Assessment:        1. Chronic RLQ pain    2. Intractable chronic migraine without aura and without status migrainosus                Plan:      Delaney was seen today for pre-op exam.    Diagnoses and all orders for this visit:    Chronic RLQ pain  -     Vital signs; Standing  -     Notify Physician/Vital Signs Parameters Urine output less than 0.5mL/kg/hr (with indwelling catheter) or 30 mL/hr (without indwelling catheter) or blood glucose greater than 200 mg/dL; Standing  -     Notify physician; Standing  -     Notify Physician - Potential Need of Opioid Reversal; Standing  -     Chlorohexidine Gluconate Bath; Standing  -     Full code; Standing  -     Place in Outpatient; Standing  -     Place sequential compression device; Standing    Factual information regarding the medical condition and the need for Dx LSC was discussed with the patient, who verbalized understanding. The therapeutic rationale, benefits, risks and potential complications were discussed, including the possibility of pain, bleeding, infection, loss of function, disfigurement, death or other unforeseen complications. Alternatives to the procedure were discussed (including potential risks, complications and  benefits of each). No guarantee was expressed or implied as to the results of the procedure. Informed consent was given, as well as a request to proceed.    Pt clear that if there is any other organ involvement (ovary, etc) to remove it.  She is not attached to her pelvic organs (of note, she was ready for hyst at our last visit).      Intractable chronic migraine without aura and without status migrainosus  -     butalbital-acetaminophen-caffeine -40 mg (FIORICET, ESGIC) -40 mg per tablet; Take 1 tablet by mouth every 4 (four) hours as needed for Pain.    Other orders  -     IP VTE LOW RISK PATIENT; Standing  -     Ambulate; Standing

## 2019-05-15 ENCOUNTER — TELEPHONE (OUTPATIENT)
Dept: OBSTETRICS AND GYNECOLOGY | Facility: CLINIC | Age: 29
End: 2019-05-15

## 2019-05-15 NOTE — TELEPHONE ENCOUNTER
Spoke with pt:    Pt was given her arrival time for her surgery with Dr. Thompson.  Pt was instructed to arrive at 8:30 am on 05/20/19 to the [facility].    Pt verbalized understanding of time, date and directions.

## 2019-05-20 ENCOUNTER — HOSPITAL ENCOUNTER (OUTPATIENT)
Facility: OTHER | Age: 29
Discharge: HOME | End: 2019-05-20
Attending: OBSTETRICS & GYNECOLOGY | Admitting: OBSTETRICS & GYNECOLOGY
Payer: COMMERCIAL

## 2019-05-20 ENCOUNTER — ANESTHESIA (OUTPATIENT)
Dept: SURGERY | Facility: OTHER | Age: 29
End: 2019-05-20
Payer: COMMERCIAL

## 2019-05-20 VITALS
HEIGHT: 63 IN | WEIGHT: 131 LBS | HEART RATE: 80 BPM | OXYGEN SATURATION: 98 % | RESPIRATION RATE: 18 BRPM | TEMPERATURE: 98 F | BODY MASS INDEX: 23.21 KG/M2 | SYSTOLIC BLOOD PRESSURE: 115 MMHG | DIASTOLIC BLOOD PRESSURE: 68 MMHG

## 2019-05-20 DIAGNOSIS — R10.31 CHRONIC RLQ PAIN: ICD-10-CM

## 2019-05-20 DIAGNOSIS — G89.29 CHRONIC RLQ PAIN: ICD-10-CM

## 2019-05-20 DIAGNOSIS — Z98.890 S/P LAPAROSCOPY: Primary | ICD-10-CM

## 2019-05-20 LAB
B-HCG UR QL: NEGATIVE
CTP QC/QA: YES

## 2019-05-20 PROCEDURE — 71000033 HC RECOVERY, INTIAL HOUR: Performed by: OBSTETRICS & GYNECOLOGY

## 2019-05-20 PROCEDURE — 81025 URINE PREGNANCY TEST: CPT | Performed by: ANESTHESIOLOGY

## 2019-05-20 PROCEDURE — 36000708 HC OR TIME LEV III 1ST 15 MIN: Performed by: OBSTETRICS & GYNECOLOGY

## 2019-05-20 PROCEDURE — 27201423 OPTIME MED/SURG SUP & DEVICES STERILE SUPPLY: Performed by: OBSTETRICS & GYNECOLOGY

## 2019-05-20 PROCEDURE — 37000008 HC ANESTHESIA 1ST 15 MINUTES: Performed by: OBSTETRICS & GYNECOLOGY

## 2019-05-20 PROCEDURE — 25000003 PHARM REV CODE 250: Performed by: ANESTHESIOLOGY

## 2019-05-20 PROCEDURE — 71000015 HC POSTOP RECOV 1ST HR: Performed by: OBSTETRICS & GYNECOLOGY

## 2019-05-20 PROCEDURE — 71000016 HC POSTOP RECOV ADDL HR: Performed by: OBSTETRICS & GYNECOLOGY

## 2019-05-20 PROCEDURE — 88305 TISSUE EXAM BY PATHOLOGIST: CPT | Mod: 26,,, | Performed by: PATHOLOGY

## 2019-05-20 PROCEDURE — 37000009 HC ANESTHESIA EA ADD 15 MINS: Performed by: OBSTETRICS & GYNECOLOGY

## 2019-05-20 PROCEDURE — 71000039 HC RECOVERY, EACH ADD'L HOUR: Performed by: OBSTETRICS & GYNECOLOGY

## 2019-05-20 PROCEDURE — 58662 LAPAROSCOPY EXCISE LESIONS: CPT | Mod: ,,, | Performed by: OBSTETRICS & GYNECOLOGY

## 2019-05-20 PROCEDURE — 88305 TISSUE SPECIMEN TO PATHOLOGY - SURGERY: ICD-10-PCS | Mod: 26,,, | Performed by: PATHOLOGY

## 2019-05-20 PROCEDURE — 63600175 PHARM REV CODE 636 W HCPCS: Performed by: ANESTHESIOLOGY

## 2019-05-20 PROCEDURE — 36000709 HC OR TIME LEV III EA ADD 15 MIN: Performed by: OBSTETRICS & GYNECOLOGY

## 2019-05-20 PROCEDURE — 88305 TISSUE EXAM BY PATHOLOGIST: CPT | Performed by: PATHOLOGY

## 2019-05-20 PROCEDURE — 58662 PR LAP,FULGURATE/EXCISE LESIONS: ICD-10-PCS | Mod: ,,, | Performed by: OBSTETRICS & GYNECOLOGY

## 2019-05-20 PROCEDURE — 63600175 PHARM REV CODE 636 W HCPCS: Performed by: NURSE ANESTHETIST, CERTIFIED REGISTERED

## 2019-05-20 PROCEDURE — 25000003 PHARM REV CODE 250: Performed by: NURSE ANESTHETIST, CERTIFIED REGISTERED

## 2019-05-20 RX ORDER — GLYCOPYRROLATE 0.2 MG/ML
INJECTION INTRAMUSCULAR; INTRAVENOUS
Status: DISCONTINUED | OUTPATIENT
Start: 2019-05-20 | End: 2019-05-20

## 2019-05-20 RX ORDER — HYDROCODONE BITARTRATE AND ACETAMINOPHEN 10; 325 MG/1; MG/1
1 TABLET ORAL EVERY 4 HOURS PRN
Status: DISCONTINUED | OUTPATIENT
Start: 2019-05-20 | End: 2019-05-20 | Stop reason: HOSPADM

## 2019-05-20 RX ORDER — DEXAMETHASONE SODIUM PHOSPHATE 4 MG/ML
INJECTION, SOLUTION INTRA-ARTICULAR; INTRALESIONAL; INTRAMUSCULAR; INTRAVENOUS; SOFT TISSUE
Status: DISCONTINUED | OUTPATIENT
Start: 2019-05-20 | End: 2019-05-20

## 2019-05-20 RX ORDER — LIDOCAINE HYDROCHLORIDE 10 MG/ML
0.5 INJECTION, SOLUTION EPIDURAL; INFILTRATION; INTRACAUDAL; PERINEURAL ONCE
Status: DISCONTINUED | OUTPATIENT
Start: 2019-05-20 | End: 2019-05-20 | Stop reason: SDUPTHER

## 2019-05-20 RX ORDER — HYDROCODONE BITARTRATE AND ACETAMINOPHEN 5; 325 MG/1; MG/1
1 TABLET ORAL EVERY 4 HOURS PRN
Status: DISCONTINUED | OUTPATIENT
Start: 2019-05-20 | End: 2019-05-20 | Stop reason: HOSPADM

## 2019-05-20 RX ORDER — DIPHENHYDRAMINE HCL 25 MG
25 CAPSULE ORAL EVERY 4 HOURS PRN
Status: DISCONTINUED | OUTPATIENT
Start: 2019-05-20 | End: 2019-05-20 | Stop reason: HOSPADM

## 2019-05-20 RX ORDER — ONDANSETRON 2 MG/ML
4 INJECTION INTRAMUSCULAR; INTRAVENOUS DAILY PRN
Status: DISCONTINUED | OUTPATIENT
Start: 2019-05-20 | End: 2019-05-20 | Stop reason: HOSPADM

## 2019-05-20 RX ORDER — PHENYLEPHRINE HYDROCHLORIDE 10 MG/ML
INJECTION INTRAVENOUS
Status: DISCONTINUED | OUTPATIENT
Start: 2019-05-20 | End: 2019-05-20

## 2019-05-20 RX ORDER — LIDOCAINE HYDROCHLORIDE 10 MG/ML
0.5 INJECTION, SOLUTION EPIDURAL; INFILTRATION; INTRACAUDAL; PERINEURAL ONCE
Status: DISCONTINUED | OUTPATIENT
Start: 2019-05-20 | End: 2019-05-20 | Stop reason: HOSPADM

## 2019-05-20 RX ORDER — DIPHENHYDRAMINE HYDROCHLORIDE 50 MG/ML
25 INJECTION INTRAMUSCULAR; INTRAVENOUS EVERY 4 HOURS PRN
Status: DISCONTINUED | OUTPATIENT
Start: 2019-05-20 | End: 2019-05-20 | Stop reason: HOSPADM

## 2019-05-20 RX ORDER — HYDROCODONE BITARTRATE AND ACETAMINOPHEN 5; 325 MG/1; MG/1
1 TABLET ORAL ONCE
Status: COMPLETED | OUTPATIENT
Start: 2019-05-20 | End: 2019-05-20

## 2019-05-20 RX ORDER — PROMETHAZINE HYDROCHLORIDE 25 MG/ML
INJECTION, SOLUTION INTRAMUSCULAR; INTRAVENOUS
Status: DISCONTINUED | OUTPATIENT
Start: 2019-05-20 | End: 2019-05-20

## 2019-05-20 RX ORDER — NEOSTIGMINE METHYLSULFATE 1 MG/ML
INJECTION, SOLUTION INTRAVENOUS
Status: DISCONTINUED | OUTPATIENT
Start: 2019-05-20 | End: 2019-05-20

## 2019-05-20 RX ORDER — MIDAZOLAM HYDROCHLORIDE 1 MG/ML
2 INJECTION INTRAMUSCULAR; INTRAVENOUS
Status: COMPLETED | OUTPATIENT
Start: 2019-05-20 | End: 2019-05-20

## 2019-05-20 RX ORDER — OXYCODONE HYDROCHLORIDE 5 MG/1
5 TABLET ORAL
Status: DISCONTINUED | OUTPATIENT
Start: 2019-05-20 | End: 2019-05-20 | Stop reason: HOSPADM

## 2019-05-20 RX ORDER — ACETAMINOPHEN 500 MG
1000 TABLET ORAL
Status: COMPLETED | OUTPATIENT
Start: 2019-05-20 | End: 2019-05-20

## 2019-05-20 RX ORDER — MEPERIDINE HYDROCHLORIDE 25 MG/ML
12.5 INJECTION INTRAMUSCULAR; INTRAVENOUS; SUBCUTANEOUS ONCE AS NEEDED
Status: DISCONTINUED | OUTPATIENT
Start: 2019-05-20 | End: 2019-05-20 | Stop reason: HOSPADM

## 2019-05-20 RX ORDER — MIDAZOLAM HYDROCHLORIDE 1 MG/ML
INJECTION INTRAMUSCULAR; INTRAVENOUS
Status: DISCONTINUED | OUTPATIENT
Start: 2019-05-20 | End: 2019-05-20

## 2019-05-20 RX ORDER — HYDROMORPHONE HYDROCHLORIDE 2 MG/ML
0.4 INJECTION, SOLUTION INTRAMUSCULAR; INTRAVENOUS; SUBCUTANEOUS EVERY 5 MIN PRN
Status: DISCONTINUED | OUTPATIENT
Start: 2019-05-20 | End: 2019-05-20 | Stop reason: HOSPADM

## 2019-05-20 RX ORDER — HYDROCODONE BITARTRATE AND ACETAMINOPHEN 5; 325 MG/1; MG/1
1 TABLET ORAL EVERY 4 HOURS PRN
Qty: 10 TABLET | Refills: 0 | Status: SHIPPED | OUTPATIENT
Start: 2019-05-20 | End: 2019-06-04

## 2019-05-20 RX ORDER — SODIUM CHLORIDE, SODIUM LACTATE, POTASSIUM CHLORIDE, CALCIUM CHLORIDE 600; 310; 30; 20 MG/100ML; MG/100ML; MG/100ML; MG/100ML
INJECTION, SOLUTION INTRAVENOUS CONTINUOUS
Status: DISCONTINUED | OUTPATIENT
Start: 2019-05-20 | End: 2019-05-20 | Stop reason: HOSPADM

## 2019-05-20 RX ORDER — LIDOCAINE HCL/PF 100 MG/5ML
SYRINGE (ML) INTRAVENOUS
Status: DISCONTINUED | OUTPATIENT
Start: 2019-05-20 | End: 2019-05-20

## 2019-05-20 RX ORDER — PROPOFOL 10 MG/ML
VIAL (ML) INTRAVENOUS CONTINUOUS PRN
Status: DISCONTINUED | OUTPATIENT
Start: 2019-05-20 | End: 2019-05-20

## 2019-05-20 RX ORDER — CELECOXIB 200 MG/1
400 CAPSULE ORAL
Status: COMPLETED | OUTPATIENT
Start: 2019-05-20 | End: 2019-05-20

## 2019-05-20 RX ORDER — ROCURONIUM BROMIDE 10 MG/ML
INJECTION, SOLUTION INTRAVENOUS
Status: DISCONTINUED | OUTPATIENT
Start: 2019-05-20 | End: 2019-05-20

## 2019-05-20 RX ORDER — KETAMINE HYDROCHLORIDE 50 MG/ML
INJECTION, SOLUTION INTRAMUSCULAR; INTRAVENOUS
Status: DISCONTINUED | OUTPATIENT
Start: 2019-05-20 | End: 2019-05-20

## 2019-05-20 RX ORDER — ONDANSETRON HYDROCHLORIDE 2 MG/ML
INJECTION, SOLUTION INTRAMUSCULAR; INTRAVENOUS
Status: DISCONTINUED | OUTPATIENT
Start: 2019-05-20 | End: 2019-05-20

## 2019-05-20 RX ORDER — SODIUM CHLORIDE, SODIUM LACTATE, POTASSIUM CHLORIDE, CALCIUM CHLORIDE 600; 310; 30; 20 MG/100ML; MG/100ML; MG/100ML; MG/100ML
INJECTION, SOLUTION INTRAVENOUS CONTINUOUS
Status: DISCONTINUED | OUTPATIENT
Start: 2019-05-20 | End: 2019-05-20 | Stop reason: SDUPTHER

## 2019-05-20 RX ORDER — FENTANYL CITRATE 50 UG/ML
INJECTION, SOLUTION INTRAMUSCULAR; INTRAVENOUS
Status: DISCONTINUED | OUTPATIENT
Start: 2019-05-20 | End: 2019-05-20

## 2019-05-20 RX ORDER — IBUPROFEN 600 MG/1
600 TABLET ORAL EVERY 6 HOURS PRN
Qty: 30 TABLET | Refills: 1 | Status: SHIPPED | OUTPATIENT
Start: 2019-05-20 | End: 2019-06-04

## 2019-05-20 RX ORDER — IBUPROFEN 600 MG/1
600 TABLET ORAL EVERY 6 HOURS PRN
Status: DISCONTINUED | OUTPATIENT
Start: 2019-05-20 | End: 2019-05-20 | Stop reason: HOSPADM

## 2019-05-20 RX ORDER — PREGABALIN 75 MG/1
75 CAPSULE ORAL
Status: COMPLETED | OUTPATIENT
Start: 2019-05-20 | End: 2019-05-20

## 2019-05-20 RX ORDER — SODIUM CHLORIDE 0.9 % (FLUSH) 0.9 %
3 SYRINGE (ML) INJECTION
Status: DISCONTINUED | OUTPATIENT
Start: 2019-05-20 | End: 2019-05-20 | Stop reason: HOSPADM

## 2019-05-20 RX ORDER — PROPOFOL 10 MG/ML
VIAL (ML) INTRAVENOUS
Status: DISCONTINUED | OUTPATIENT
Start: 2019-05-20 | End: 2019-05-20

## 2019-05-20 RX ORDER — DIPHENHYDRAMINE HYDROCHLORIDE 50 MG/ML
INJECTION INTRAMUSCULAR; INTRAVENOUS
Status: DISCONTINUED | OUTPATIENT
Start: 2019-05-20 | End: 2019-05-20

## 2019-05-20 RX ORDER — ONDANSETRON 8 MG/1
8 TABLET, ORALLY DISINTEGRATING ORAL EVERY 8 HOURS PRN
Status: DISCONTINUED | OUTPATIENT
Start: 2019-05-20 | End: 2019-05-20 | Stop reason: HOSPADM

## 2019-05-20 RX ADMIN — MIDAZOLAM HYDROCHLORIDE 2 MG: 1 INJECTION, SOLUTION INTRAMUSCULAR; INTRAVENOUS at 09:05

## 2019-05-20 RX ADMIN — PROPOFOL 200 MCG/KG/MIN: 10 INJECTION, EMULSION INTRAVENOUS at 11:05

## 2019-05-20 RX ADMIN — Medication 10 MG: at 12:05

## 2019-05-20 RX ADMIN — KETAMINE HYDROCHLORIDE 25 MG: 50 INJECTION, SOLUTION INTRAMUSCULAR; INTRAVENOUS at 11:05

## 2019-05-20 RX ADMIN — PHENYLEPHRINE HYDROCHLORIDE 100 MCG: 10 INJECTION INTRAVENOUS at 11:05

## 2019-05-20 RX ADMIN — PROPOFOL 200 MG: 10 INJECTION, EMULSION INTRAVENOUS at 11:05

## 2019-05-20 RX ADMIN — NEOSTIGMINE METHYLSULFATE 3 MG: 1 INJECTION INTRAVENOUS at 12:05

## 2019-05-20 RX ADMIN — ONDANSETRON 4 MG: 2 INJECTION, SOLUTION INTRAMUSCULAR; INTRAVENOUS at 11:05

## 2019-05-20 RX ADMIN — PREGABALIN 75 MG: 75 CAPSULE ORAL at 09:05

## 2019-05-20 RX ADMIN — CELECOXIB 400 MG: 200 CAPSULE ORAL at 09:05

## 2019-05-20 RX ADMIN — LIDOCAINE HYDROCHLORIDE 50 MG: 20 INJECTION, SOLUTION INTRAVENOUS at 11:05

## 2019-05-20 RX ADMIN — ROCURONIUM BROMIDE 40 MG: 10 INJECTION, SOLUTION INTRAVENOUS at 11:05

## 2019-05-20 RX ADMIN — DEXAMETHASONE SODIUM PHOSPHATE 8 MG: 4 INJECTION, SOLUTION INTRAMUSCULAR; INTRAVENOUS at 11:05

## 2019-05-20 RX ADMIN — PROMETHAZINE HYDROCHLORIDE 6.25 MG: 25 INJECTION INTRAMUSCULAR; INTRAVENOUS at 12:05

## 2019-05-20 RX ADMIN — ACETAMINOPHEN 1000 MG: 500 TABLET, FILM COATED ORAL at 09:05

## 2019-05-20 RX ADMIN — SODIUM CHLORIDE, SODIUM LACTATE, POTASSIUM CHLORIDE, AND CALCIUM CHLORIDE: 600; 310; 30; 20 INJECTION, SOLUTION INTRAVENOUS at 10:05

## 2019-05-20 RX ADMIN — CARBOXYMETHYLCELLULOSE SODIUM 2 DROP: 2.5 SOLUTION/ DROPS OPHTHALMIC at 11:05

## 2019-05-20 RX ADMIN — HYDROCODONE BITARTRATE AND ACETAMINOPHEN 1 TABLET: 5; 325 TABLET ORAL at 04:05

## 2019-05-20 RX ADMIN — FENTANYL CITRATE 50 MCG: 50 INJECTION, SOLUTION INTRAMUSCULAR; INTRAVENOUS at 11:05

## 2019-05-20 RX ADMIN — PROPOFOL 50 MG: 10 INJECTION, EMULSION INTRAVENOUS at 11:05

## 2019-05-20 RX ADMIN — DIPHENHYDRAMINE HYDROCHLORIDE 6.25 MG: 50 INJECTION, SOLUTION INTRAMUSCULAR; INTRAVENOUS at 11:05

## 2019-05-20 RX ADMIN — IBUPROFEN 800 MG: 800 INJECTION INTRAVENOUS at 11:05

## 2019-05-20 RX ADMIN — MIDAZOLAM HYDROCHLORIDE 2 MG: 1 INJECTION, SOLUTION INTRAMUSCULAR; INTRAVENOUS at 11:05

## 2019-05-20 RX ADMIN — GLYCOPYRROLATE 0.6 MG: 0.2 INJECTION, SOLUTION INTRAMUSCULAR; INTRAVENOUS at 12:05

## 2019-05-20 RX ADMIN — Medication 10 MG: at 11:05

## 2019-05-20 NOTE — OR NURSING
Pt with extreme somnolence.  Frowns only when attempt to arouse. Dr Sharma at bedside. Airway removed without event. Pt placed on room air. O2 Sats 98%.

## 2019-05-20 NOTE — OR NURSING
"Pt eyes barely opening with vigorous attempt to arouse. Nods head "yes" when asked if she knows she is in the recovery room.  "

## 2019-05-20 NOTE — OR NURSING
Spoke to GYN resident, aware that patient is complaining of burning upon urination that is worse than when she has a UTI, and that she would like to speak to the Dr. States she will have someone come to speak to her.

## 2019-05-20 NOTE — DISCHARGE INSTRUCTIONS
Abdominal Laparoscopy Discharge Instructions      Activity  · Limit your activity for 4-6 weeks.  · Dont lift anything heavier than 5-10 pounds.  · Avoid strenuous activities, such as mowing the lawn, vacuuming, or playing sports.  · Limit your activity to short, slow walks. Gradually increase your pace and distance as you feel able.  · Listen to your body. If an activity causes pain, stop.  · Rest when you are tired.  · Dont have sexual intercourse or use tampons or douches until your doctor says its safe to do so.    Home Care  · Always keep your incision clean and dry.  · Shower as instructed in 24 hours. You may wash your incision gently with mild soap and warm water and pat dry.  Avoid tub baths, hot tubs and swimming pools until seen by your physician for a post-op follow up.  · If you have steri strips they should fall off in 7-10 days.    · If you have band aids covering your incisions change daily or when soiled.  The band aids may be removed post op day 1 if they are clean and dry.  · Take your medication exactly as instructed by your doctor.  · Return to your diet as you feel able. Eat a healthy, well-balanced diet.  · Avoid constipation.  ¨ Use laxatives, stool softeners, or enemas as directed by your doctor.  ¨ Eat more high-fiber foods.  ¨ Drink 6-8 glasses of water every day, unless directed otherwise.    Follow-Up  Make a follow-up appointment as directed by our staff.       When to Call Your Doctor  Call your doctor right away if you have any of the following:    · Fever above 101.5°F  (38.6°C) or chills  · Bright red vaginal bleeding or a foul-smelling discharge  · Vaginal bleeding that soaks more than one sanitary pad per hour  · Trouble urinating or burning sensation when you urinate  · Severe abdominal pain or bloating  · Redness, swelling, or drainage at your incision site  · Shortness of breath        ________________________________________________________________  FOR EMERGENCIES:  If any  unusual problems or difficulties occur, contact Dr. Thompson or the resident at [phone] (page ) or at the Clinic office, [phone].      Anesthesia: After Your Surgery  Youve just had surgery. During surgery, you received medication called anesthesia to keep you comfortable and pain-free. After surgery, you may experience some pain or nausea. This is common. Here are some tips for feeling better and recovering after surgery.    Going home  Your doctor or nurse will show you how to take care of yourself when you go home. He or she will also answer your questions. Have an adult family member or friend drive you home. For the first 24 hours after your surgery:    · Do not drive or use heavy equipment.  · Do not make important decisions or sign legal documents.  · Avoid alcohol.  · Have someone stay with you, if needed. He or she can watch for problems and help keep you safe.    Be sure to keep all follow-up appointments with your doctor. And rest after your procedure for as long as your doctor tells you to.    Coping with pain  If you have pain after surgery, pain medication will help you feel better. Take it as directed, before pain becomes severe. Also, ask your doctor or pharmacist about other ways to control pain, such as with heat, ice, and relaxation. And follow any other instructions your surgeon or nurse gives you.    URINARY RETENTION  Should you experience a decrease in your urine output or are unable to urinate following surgery, this can be due to the medications given during surgery.  We recommend you going to the nearest Emergency Department.    Tips for taking pain medication  To get the best relief possible, remember these points:    · Pain medications can upset your stomach. Taking them with a little food may help.  · Most pain relievers taken by mouth need at least 20 to 30 minutes to take effect.  · Taking medication on a schedule can help you remember to take it. Try to time your  medication so that you can take it before beginning an activity, such as dressing, walking, or sitting down for dinner.  · Constipation is a common side effect of pain medications. Contact your doctor before taking any medications like laxatives or stool softeners to help relieve constipation. Also ask about any dietary restrictions, because drinking lots of fluids and eating foods like fruits and vegetables that are high in fiber can also help. Remember, dont take laxatives unless your surgeon has prescribed them.  · Mixing alcohol and pain medication can cause dizziness and slow your breathing. It can even be fatal. Dont drink alcohol while taking pain medication.  · Pain medication can slow your reflexes. Dont drive or operate machinery while taking pain medication.    If your health care provider tells you to take acetaminophen to help relieve your pain, ask him or her how much you are supposed to take each day. (Acetaminophen is the generic name for Tylenol and other brand-name pain relievers.) Acetaminophen or other pain relievers may interact with your prescription medicines or other over-the-counter (OTC) drugs. Some prescription medications contain acetaminophen along with other active ingredients. Using both prescription and OTC acetaminophen for pain can cause you to overdose. The FDA recommends that you read the labels on your OTC medications carefully. This will help you to clearly understand the list of active ingredients, dosing instructions, and any warnings. It may also help you avoid taking too much acetaminophen. If you have questions or don't understand the information, ask your pharmacist or health care provider to explain it to you before you take the OTC medication.    Managing nausea  Some people have an upset stomach after surgery. This is often due to anesthesia, pain, pain medications, or the stress of surgery. The following tips will help you manage nausea and get good nutrition as you  recover. If you were on a special diet before surgery, ask your doctor if you should follow it during recovery. These tips may help:    · Dont push yourself to eat. Your body will tell you when to eat and how much.  · Start off with clear liquids and soup. They are easier to digest.  · Progress to semi-solid foods (mashed potatoes, applesauce, and gelatin) as you feel ready.  · Slowly move to solid foods. Dont eat fatty, rich, or spicy foods at first.  · Dont force yourself to have three large meals a day. Instead, eat smaller amounts more often.  · Take pain medications with a small amount of solid food, such as crackers or toast to avoid nausea.      Call your surgeon if    · You feel too sleepy, dizzy, or groggy (medication may be too strong).  · You have side effects like nausea, vomiting, or skin changes (rash, itching, or hives).     © 4196-3116 [employer]. [address] [place] [address], [place] [address]. All rights reserved. This information is not intended as a substitute for professional medical care. Always follow your healthcare professional's instructions.    PLEASE FOLLOW ANY OTHER INSTRUCTIONS PROVIDED TO YOU BY DR. LIMON!

## 2019-05-20 NOTE — PLAN OF CARE
Delaney Arechiga has met all discharge criteria from Phase II. Vital Signs are stable, ambulating  without difficulty. Discharge instructions given, patient verbalized understanding. Discharged from facility via wheelchair in stable condition.

## 2019-05-20 NOTE — INTERVAL H&P NOTE
The patient has been examined and the H&P has been reviewed:    I concur with the findings and no changes have occurred since H&P was written.   All questions answered.  To OR for planned procedure.    Anesthesia/Surgery risks, benefits and alternative options discussed and understood by patient/family.      Active Hospital Problems    Diagnosis  POA    Chronic RLQ pain [R10.31, G89.29]  Yes      Resolved Hospital Problems   No resolved problems to display.     Misti Menjivar MD  OBGYN PGY-1

## 2019-05-20 NOTE — TRANSFER OF CARE
"Anesthesia Transfer of Care Note    Patient: Delaney Arechiga    Procedure(s) Performed: Procedure(s) (LRB):  LAPAROSCOPY, DIAGNOSTIC (N/A)    Patient location: PACU    Anesthesia Type: general    Transport from OR: Transported from OR on 2-3 L/min O2 by NC with adequate spontaneous ventilation    Post pain: adequate analgesia    Post assessment: no apparent anesthetic complications    Post vital signs: stable    Level of consciousness: awake    Nausea/Vomiting: no nausea/vomiting    Complications: none    Transfer of care protocol was followed      Last vitals:   Visit Vitals  /87 (BP Location: Right arm, Patient Position: Sitting)   Pulse 89   Temp 37.1 °C (98.7 °F) (Oral)   Resp 16   Ht 5' 3" (1.6 m)   Wt 59.4 kg (131 lb)   LMP 05/07/2019 (Approximate)   SpO2 100%   Breastfeeding? No   BMI 23.21 kg/m²     "

## 2019-05-20 NOTE — BRIEF OP NOTE
[facility]  Brief Operative Note    SUMMARY     Surgery Date: 5/20/2019     Surgeon(s) and Role:     * Jeet Thompson MD - Primary    Assisting Surgeon: Susan Amezquita MD- Resident    Pre-op Diagnosis:  Female pelvic pain [R10.2]  Dysmenorrhea [N94.6]    Post-op Diagnosis:  Post-Op Diagnosis Codes:     * Female pelvic pain [R10.2]     * Dysmenorrhea [N94.6]    Procedure(s) (LRB):  LAPAROSCOPY, DIAGNOSTIC (N/A)  EXCISION, ENDOMETRIOMA    Anesthesia: General    Description of Procedure:   - Normal external female genitalia  - Pelvic survey notable for normal uterine contour, normal fallopian tubes and ovaries bilaterally  - Endometriosis lesions identified in left ovarian fossa and right ovarian fossa.  These were excised  - Omental adhesions to right lateral side wall, these were taken down with sharp dissection  - Normal appendix, normal liver edge, normal cardiac window  - Bilateral ureters vermiculating   - Hemostasis confirmed at end of procedure    Estimated Blood Loss:  5 cc       Specimens:   Specimen (12h ago, onward)    Start     Ordered    05/20/19 1246  Specimen to Pathology - Surgery  Once     Comments:  Pre-op Diagnosis: Female pelvic pain [R10.2]Dysmenorrhea [N94.6]Procedure(s):LAPAROSCOPY, DIAGNOSTIC Number of specimens:2Name of specimens:1/ left ovarian fossa2/ right ovarian fossa     Start Status     05/20/19 1246 Collected (05/20/19 1247) Order ID: [id number]       05/20/19 1246

## 2019-05-20 NOTE — ANESTHESIA POSTPROCEDURE EVALUATION
Anesthesia Post Evaluation    Patient: Delaney Arechiga    Procedure(s) Performed: Procedure(s) (LRB):  LAPAROSCOPY, DIAGNOSTIC (N/A)  EXCISION, ENDOMETRIOMA    Final Anesthesia Type: general  Patient location during evaluation: PACU  Patient participation: Yes- Able to Participate  Level of consciousness: awake and alert  Post-procedure vital signs: reviewed and stable  Pain management: adequate  Airway patency: patent  PONV status at discharge: No PONV  Anesthetic complications: no      Cardiovascular status: blood pressure returned to baseline  Respiratory status: unassisted and spontaneous ventilation  Hydration status: euvolemic  Follow-up not needed.          Vitals Value Taken Time   /68 5/20/2019  1:49 PM   Temp 36.5 °C (97.7 °F) 5/20/2019  1:00 PM   Pulse 78 5/20/2019  1:59 PM   Resp 16 5/20/2019  1:00 PM   SpO2 97 % 5/20/2019  1:59 PM   Vitals shown include unvalidated device data.      No case tracking events are documented in the log.      Pain/Gilda Score: Pain Rating Prior to Med Admin: 5 (5/20/2019  9:02 AM)  Gilda Score: 7 (5/20/2019  1:30 PM)

## 2019-05-20 NOTE — OP NOTE
OPERATIVE REPORT FOR LAPAROSCOPIC EXCISION OF ENDOMETRIOSIS                                                                                                                                 Date of Procedure: 05/20/2019                                                                               SURGEON:  Jeet Thompson M.D.                                                                                                                    ASSISTANT:  Dr. Irene Amezquita; Dr. Misti Menjivar (RES)                                                                                               PREOPERATIVE DIAGNOSIS:          1. S/P diagnostic laparoscopy, excision of endometriosis 5/20/19    2. Chronic RLQ pain                                                                                   POSTOPERATIVE DIAGNOSIS:       1. S/P diagnostic laparoscopy, excision of endometriosis 5/20/19    2. Chronic RLQ pain                                                                                   PROCEDURE:  Diagnostic laparoscopy, excision of endometriosis, lysis of adhesions lasting for 35 min (mod 22), ureterolysis                                                                                                                   ANESTHESIA:  GETA    BLOOD LOSS:  10  mL.                                                                                                                                      URINE OUTPUT:  150 mL.                                                                                       IV FLUIDS:  800 cc    COMPLICATIONS:  None    SPECIMINES:  Peritoneal biopsies    DRAINS:  None      FINDINGS:  1) normal uterine cavity  2) Normal upper abdomen  3) Normal ovaries bilaterally  4) Minimal descent  5) Stage 1 endometriosis    STATEMENT OF MEDICAL NECESSITY:  Pt is a 29 y.o. year old female who has persistent pain unresponsive to medical treatment.  After discussing risks/benefits/alternatives/and indications, pt  wished to proceed with the above stated case.                                                                               PROCEDURE IN DETAIL:  After appropriate consents had been obtained and time out performed, the patient was taken to the procedure room at which time general anesthesia was administered.  The patient was then placed in the lithotomy position, prepped in the appropriate sterile fashion.  A sterile speculum was put in place and a uterine manipulator was inserted after decompression of the bladder..  Attention was then taken to the umbilicus at which time a Veress needle was inserted.  After confirming an opening pressure of 3 mmHg, the abdomen was insufflated to a maximum pressure of 15 mmHg.  A 5mm skin incision was then made and the trocar was inserted under direct visualization.  The patient was placed in [place] and 2 5mm trocars were placed in the bilateral lower quadrants under direct visualization to avoid injury to the underlying structures.  A thorough inspection of the abdomen and pelvis was performed with the above findings.  Attention was first taken to the left side at which time endometriosis  was found in the left ovarian fossa.  Attention was then taken to the cul-de-sac at which time no endo was found.  Attention was then taken to the right side at which time endometriosis was found near right ureter.  Attention was turned to the uterus where no endo was found.  Both ovaries and tubes were inspected and nothing unusual was found.  And finally, a thorough upper abdominal survey was performed and adhesions to the right abdominal sidewall was found.  Bilateral uteterolysis was performed to avoid injury.  All endometriosis was excised using monopolar cautery made hemostatic after tissue removal.  After confirming hemostasis, tri was not used.  The pneumoperitoneum was released and the trocars were removed under visualization.  The skin was closed using a 4-0 monocryl suture.   The patient was then extubated and taken to the recovery area in stable condition.  The uterine manipulator was removed.       All counts were correct x 2 at the end of the procedure.  Antibiotics were not indicated for this case.

## 2019-05-20 NOTE — DISCHARGE SUMMARY
[facility]  Obstetrics & Gynecology  Discharge Summary    Patient Name: Delaney Arechiga  MRN: [id number]  Admission Date: 5/20/2019  Hospital Length of Stay: 0 days  Discharge Date and Time:  05/20/2019 2:00 PM  Attending Physician: Jeet Thompson MD   Discharging Provider: Susan Amezquita MD  Primary Care Provider: Ale Quinn MD      Hospital Course: Patient presented for scheduled procedure. Patient was passed back to OR for diagnostic laparoscopy with excision of endometriosis. Please see OP note for further details. Tolerated procedure well and patient was taken to recovery in a stable condition. Prior to discharge patient was able to void, ambulate, tolerate PO and pain was well controlled with PO meds. Patient was given routine post-op instructions for which patient voiced understanding. Patient was subsequently discharged home.      Procedure(s) (LRB):  LAPAROSCOPY, DIAGNOSTIC (N/A)  EXCISION, ENDOMETRIOMA         Pending Diagnostic Studies:     None        Final Active Diagnoses:    Diagnosis Date Noted POA    Chronic RLQ pain [R10.31, G89.29] 05/20/2019 Yes    S/P diagnostic laparoscopy, excision of endometriosis 5/20/19 [Z98.890] 05/20/2019 Not Applicable      Problems Resolved During this Admission:        Discharged Condition: good    Disposition:     Follow Up:    Patient Instructions:      Diet general     Call MD for:  extreme fatigue     Call MD for:  persistent dizziness or light-headedness     Call MD for:  hives     Call MD for:  redness, tenderness, or signs of infection (pain, swelling, redness, odor or green/yellow discharge around incision site)     Call MD for:  difficulty breathing, headache or visual disturbances     Call MD for:  severe uncontrolled pain     Call MD for:  persistent nausea and vomiting     Call MD for:  temperature >100.4     Activity as tolerated     Medications:  Reconciled Home Medications:      Medication List      START taking these  medications    HYDROcodone-acetaminophen 5-325 mg per tablet  Commonly known as:  NORCO  Take 1 tablet by mouth every 4 (four) hours as needed for Pain.     ibuprofen 600 MG tablet  Commonly known as:  ADVIL,MOTRIN  Take 1 tablet (600 mg total) by mouth every 6 (six) hours as needed for Pain.        CONTINUE taking these medications    buPROPion 150 MG TB24 tablet  Commonly known as:  WELLBUTRIN XL  Take 1 tablet (150 mg total) by mouth once daily.     * FIORICET ORAL  Take by mouth as needed.     * butalbital-acetaminophen-caffeine -40 mg -40 mg per tablet  Commonly known as:  FIORICET, ESGIC  Take 1 tablet by mouth every 4 (four) hours as needed for Pain.     clindamycin-tretinoin gel  Commonly known as:  ZIANA  Apply topically every evening.     EXCEDRIN MIGRAINE ORAL  Take by mouth Daily.     levonorgestrel 20 mcg/24 hours (5 yrs) 52 mg IUD  Commonly known as:  MIRENA  1 each by Intrauterine route once.     loratadine 10 mg tablet  Commonly known as:  CLARITIN  Take 10 mg by mouth once daily.     nadolol 20 MG tablet  Commonly known as:  CORGARD  Take 1 tablet (20 mg total) by mouth once daily.     norethindrone 5 mg Tab  Commonly known as:  AYGESTIN  Take 1 tablet (5 mg total) by mouth once daily.     polyethylene glycol 17 gram Pwpk  Commonly known as:  GLYCOLAX  Take 17-34 g by mouth daily as needed (constipation). 17 g dissolved in 8 oz of water once daily and titrate up or down (max 34 g daily) to effect.     rizatriptan 5 MG tablet  Commonly known as:  MAXALT  Take 1 tablet (5 mg total) by mouth as needed for Migraine (Take 1 tab as needed for severe migraine. Do not exceed taking more than 3 times a week).     spironolactone 50 MG tablet  Commonly known as:  ALDACTONE  Start with 1 po qday, increase to 2 po qday as tolerated         * This list has 2 medication(s) that are the same as other medications prescribed for you. Read the directions carefully, and ask your doctor or other care  provider to review them with you.                Susan Amezquita MD  Obstetrics & Gynecology  [facility]

## 2019-05-23 ENCOUNTER — TELEPHONE (OUTPATIENT)
Dept: OBSTETRICS AND GYNECOLOGY | Facility: CLINIC | Age: 29
End: 2019-05-23

## 2019-05-23 NOTE — TELEPHONE ENCOUNTER
Spoke with Pt:  Pt is feeling well, however, she is tired.  Pt was taking a nap when staff called.    Pt denies any fever, vomiting, discharge.  Pt is having BM's and is passing gas.    Pt is not having to take hydrocodone at this time, but is taking ibuprofen.    Pt does not want to make Post Op appt at this time - and she will call back to schedule appt.    Pt verbalized understanding.

## 2019-06-04 ENCOUNTER — OFFICE VISIT (OUTPATIENT)
Dept: OBSTETRICS AND GYNECOLOGY | Facility: CLINIC | Age: 29
End: 2019-06-04
Payer: COMMERCIAL

## 2019-06-04 VITALS
BODY MASS INDEX: 23.2 KG/M2 | DIASTOLIC BLOOD PRESSURE: 80 MMHG | SYSTOLIC BLOOD PRESSURE: 118 MMHG | HEIGHT: 63 IN | WEIGHT: 130.94 LBS

## 2019-06-04 DIAGNOSIS — L03.311 CELLULITIS OF ABDOMINAL WALL: Primary | ICD-10-CM

## 2019-06-04 PROCEDURE — 99999 PR PBB SHADOW E&M-EST. PATIENT-LVL II: CPT | Mod: PBBFAC,,, | Performed by: OBSTETRICS & GYNECOLOGY

## 2019-06-04 PROCEDURE — 99213 PR OFFICE/OUTPT VISIT, EST, LEVL III, 20-29 MIN: ICD-10-PCS | Mod: 24,S$GLB,, | Performed by: OBSTETRICS & GYNECOLOGY

## 2019-06-04 PROCEDURE — 99213 OFFICE O/P EST LOW 20 MIN: CPT | Mod: 24,S$GLB,, | Performed by: OBSTETRICS & GYNECOLOGY

## 2019-06-04 PROCEDURE — 3008F BODY MASS INDEX DOCD: CPT | Mod: CPTII,S$GLB,, | Performed by: OBSTETRICS & GYNECOLOGY

## 2019-06-04 PROCEDURE — 3008F PR BODY MASS INDEX (BMI) DOCUMENTED: ICD-10-PCS | Mod: CPTII,S$GLB,, | Performed by: OBSTETRICS & GYNECOLOGY

## 2019-06-04 PROCEDURE — 99999 PR PBB SHADOW E&M-EST. PATIENT-LVL II: ICD-10-PCS | Mod: PBBFAC,,, | Performed by: OBSTETRICS & GYNECOLOGY

## 2019-06-04 RX ORDER — FLUCONAZOLE 150 MG/1
150 TABLET ORAL ONCE
Qty: 1 TABLET | Refills: 2 | Status: SHIPPED | OUTPATIENT
Start: 2019-06-04 | End: 2019-06-04

## 2019-06-04 RX ORDER — MUPIROCIN CALCIUM 20 MG/G
CREAM TOPICAL
Qty: 30 G | Refills: 0 | Status: SHIPPED | OUTPATIENT
Start: 2019-06-04 | End: 2019-07-22

## 2019-06-04 RX ORDER — CLINDAMYCIN HYDROCHLORIDE 300 MG/1
300 CAPSULE ORAL EVERY 6 HOURS
Qty: 28 CAPSULE | Refills: 0 | Status: SHIPPED | OUTPATIENT
Start: 2019-06-04 | End: 2019-06-11

## 2019-06-04 NOTE — PROGRESS NOTES
Subjective:       Patient ID: Delaney Arechiga is a 29 y.o. female.    Chief Complaint:  Wound Check (pt noticed possible infection Friday)      History of Present Illness  HPI  Pt is 29 y.o. with Patient's last menstrual period was 2019 (approximate). here b/c of redness around her recent surgery incision. Had some mild drainage last weekend.  No fevers or chills.  She has been cleaning it with iodine.  Is currently not weeping.  Steri-Strips fell off 2 days ago.    GYN & OB History  Patient's last menstrual period was 2019 (approximate).   Date of Last Pap: 4/3/2019    OB History    Para Term  AB Living   0 0 0 0 0 0   SAB TAB Ectopic Multiple Live Births   0 0 0 0 0       Review of Systems  Review of Systems   Constitutional: Negative for activity change, appetite change and fatigue.   HENT: Negative.  Negative for tinnitus.    Eyes: Negative.    Respiratory: Negative for cough and shortness of breath.    Cardiovascular: Negative for chest pain and palpitations.   Gastrointestinal: Negative.  Negative for abdominal pain, blood in stool, constipation, diarrhea and nausea.   Endocrine: Negative.  Negative for hot flashes.   Genitourinary: Negative for dysmenorrhea, dyspareunia, dysuria, frequency, menstrual problem, pelvic pain, vaginal discharge, urinary incontinence and postcoital bleeding.   Musculoskeletal: Negative for back pain and joint swelling.   Integumentary:  Negative for rash, breast mass, nipple discharge and breast skin changes.   Neurological: Negative.  Negative for headaches.   Hematological: Negative.  Does not bruise/bleed easily.   Psychiatric/Behavioral: The patient is not nervous/anxious.    Breast: negative.  Negative for mass, nipple discharge and skin changes          Objective:    Physical Exam:   Constitutional: She is oriented to person, place, and time. She appears well-developed and well-nourished. No distress.    HENT:   Head: Normocephalic and  atraumatic.    Eyes: Pupils are equal, round, and reactive to light. Conjunctivae and lids are normal.    Neck: Normal range of motion and full passive range of motion without pain. Neck supple.    Cardiovascular: Normal rate and regular rhythm.  Exam reveals no edema.     Pulmonary/Chest: Effort normal and breath sounds normal. She has no wheezes.        Abdominal: Soft. Normal appearance and bowel sounds are normal. She exhibits no distension. There is no tenderness. There is no rebound and no guarding.                 Musculoskeletal: Normal range of motion and moves all extremeties.       Neurological: She is alert and oriented to person, place, and time. She has normal strength.    Skin: Skin is warm and dry.    Psychiatric: She has a normal mood and affect. Her speech is normal and behavior is normal. Thought content normal. Her mood appears not anxious. She does not exhibit a depressed mood. She expresses no suicidal plans and no homicidal plans.          Assessment:        1. Cellulitis of abdominal wall                Plan:      Delaney was seen today for wound check.    Diagnoses and all orders for this visit:    Cellulitis of abdominal wall  -     fluconazole (DIFLUCAN) 150 MG Tab; Take 1 tablet (150 mg total) by mouth once. for 1 dose  -     clindamycin (CLEOCIN) 300 MG capsule; Take 1 capsule (300 mg total) by mouth every 6 (six) hours. for 7 days  -     mupirocin calcium 2% (BACTROBAN) 2 % cream; Apply to affected area 3 times daily  Likely mild cellulitis of incision site.  Will tx with abx.  Notify office if not improved by next week.

## 2019-06-07 ENCOUNTER — PATIENT MESSAGE (OUTPATIENT)
Dept: OBSTETRICS AND GYNECOLOGY | Facility: CLINIC | Age: 29
End: 2019-06-07

## 2019-06-21 NOTE — PROGRESS NOTES
Neurology Consult Note    Chief Complaint: headaches    HPI:   Delaney Arechiga is a 29 y.o. female with medical conditions as outlined below who presents for further evaluation of headaches. Patient reports for a few years, she has been getting gradual onset throbbing headaches associated with phonophobia, photophobia and nausea. She states these headaches can get up to a 10/10 pain at times. She states she gets daily headaches, but at times they can be severe. She has a family history of migraines. She denies, changes in speech, focal numbness or focal weakness with her headaches.    Past Medical History:  Past Medical History:   Diagnosis Date    Abnormal Pap smear of cervix     Family history of breast cancer     Heart murmur 3/25/2019    Migraines     PONV (postoperative nausea and vomiting)     Urticaria        Past Surgical History:  Past Surgical History:   Procedure Laterality Date    BUNIONECTOMY Right     COLPOSCOPY      EXCISION, ENDOMETRIOMA  2019    [facility]    LAPAROSCOPY, DIAGNOSTIC N/A 2019    [facility] at [facility]    TONSILLECTOMY         Social History:  Social History     Socioeconomic History    Marital status: Single     Spouse name: Not on file    Number of children: Not on file    Years of education: Not on file    Highest education level: Not on file   Occupational History    Not on file   Social Needs    Financial resource strain: Very hard    Food insecurity:     Worry: Never true     Inability: Never true    Transportation needs:     Medical: No     Non-medical: No   Tobacco Use    Smoking status: Former Smoker     Types: Cigarettes     Last attempt to quit: 2018     Years since quittin.4    Smokeless tobacco: Never Used   Substance and Sexual Activity    Alcohol use: Not Currently     Frequency: Never     Drinks per session: Patient refused     Binge frequency: Never    Drug use:  Never    Sexual activity: Not Currently     Partners: Male     Birth control/protection: IUD     Comment: Mirena placed January 2017   Lifestyle    Physical activity:     Days per week: 1 day     Minutes per session: 30 min    Stress: Rather much   Relationships    Social connections:     Talks on phone: Twice a week     Gets together: More than three times a week     Attends Gnosticist service: Not on file     Active member of club or organization: Yes     Attends meetings of clubs or organizations: More than 4 times per year     Relationship status: Never    Other Topics Concern    Are you pregnant or think you may be? Not Asked    Breast-feeding Not Asked   Social History Narrative    Not on file       Family History:  Family History   Problem Relation Age of Onset    Migraines Mother     Stroke Mother 67    Hypertension Mother     Migraines Father     Aortic aneurysm Father     Peripheral vascular disease Father         s/p leg stent    Migraines Sister     Breast cancer Maternal Grandmother 80    Heart failure Maternal Grandfather     Ovarian cancer Paternal Grandmother     Breast cancer Maternal Aunt 35    Breast cancer Maternal Aunt 40    Colon cancer Neg Hx     Diabetes Neg Hx     Melanoma Neg Hx        Medications:  Current Outpatient Medications   Medication Sig Dispense Refill    buPROPion (WELLBUTRIN XL) 150 MG TB24 tablet Take 1 tablet (150 mg total) by mouth once daily. 30 tablet 3    levonorgestrel (MIRENA) 20 mcg/24 hr (5 years) IUD 1 each by Intrauterine route once.      loratadine (CLARITIN) 10 mg tablet Take 10 mg by mouth once daily.      norethindrone (AYGESTIN) 5 mg Tab Take 1 tablet (5 mg total) by mouth once daily. 30 tablet 11    spironolactone (ALDACTONE) 50 MG tablet Start with 1 po qday, increase to 2 po qday as tolerated 60 tablet 2    mupirocin calcium 2% (BACTROBAN) 2 % cream Apply to affected area 3 times daily 30 g 0     No current  facility-administered medications for this visit.        Allergies:  Review of patient's allergies indicates:   Allergen Reactions    Opioids - morphine analogues Shortness Of Breath, Itching, Nausea And Vomiting, Anxiety and Palpitations     Patient states she can take Norco    Penicillins Hives and Swelling    Sulfa (sulfonamide antibiotics) Other (See Comments)       ROS:  A 12 point review of system was negative aside from pertinent positives and negatives as outlined above.    Physical Exam  Vitals:    04/30/19 1111   BP: (!) 131/90   Pulse: 84       General: well nourished, well developed  Eyes: no scleral icterus   Nose: nasal turbinates intact  Neck: supple, ROM intact  Skin: no rash or ecchymosis  Joints: no swelling or erythema  Cardiac: regular rate and rhythm  Lungs: clear to auscultation bilaterally    Neuro:  Mental status: AAO x 3, no dysarthria, no aphasia, communicating appropriately  CN: PERRL, EOMI, VFF, V1-V3 sensation intact, no facial asymmetry, hearing grossly intact, tongue midline  Motor:   RUE 5/5  RLE 5/5  LUE 5/5  LLE 5/5    Normal bulk and tone    Reflexes: 2+ throughout, toes equivocal bilaterally  Sensory: intact to light touch throughout  Coordination: no dysmetria on FTN  Gait: steady    Prior Imaging/Labs:  No recent neuroimaging available for review      Assessment and Plan:    29 y.o. female with headaches likely 2/2 migraine .    1. Migraine with aura and without status migrainosus, not intractable  Rizatriptan 5mg as needed for severe migraine. Patient was advised not to exceed taking more than 2 times a day or 3 times in a week. She was made aware of side effects of this medication and was advised to notify me if she experiences any  - MRI Brain W WO Contrast; Future  - Creatinine, serum; Future            Patient was advised to notify me for worsening symptoms. I will see patient back in 1 month or sooner if necessary.     Thank you for this consultation.    Alisson  Juliette JIM  [facility]  [address] [facility] [address]  [place] [address]  [phone]

## 2019-06-28 ENCOUNTER — PATIENT OUTREACH (OUTPATIENT)
Dept: ADMINISTRATIVE | Facility: OTHER | Age: 29
End: 2019-06-28

## 2019-07-19 PROBLEM — Z98.890 S/P LAPAROSCOPY: Status: RESOLVED | Noted: 2019-05-20 | Resolved: 2019-07-19

## 2019-07-19 PROBLEM — N80.9 ENDOMETRIOSIS: Status: ACTIVE | Noted: 2019-03-22

## 2019-07-19 NOTE — PROGRESS NOTES
Subjective:       Patient ID: Delaney Arechiga is a 29 y.o. female who  has a past medical history of Abnormal Pap smear of cervix (2012), Family history of breast cancer, Heart murmur (3/25/2019), Migraines, PONV (postoperative nausea and vomiting), S/P diagnostic laparoscopy, excision of endometriosis 5/20/19 (5/20/2019), and Urticaria.    Chief Complaint: Follow-up (depression)     HPI    History was obtained from the patient and supplemented through chart review  Underwent diagnostic laparoscopy for endometriosis, excision of endometrioma.    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    Depression:  Has been present for her whole life.  Has been on antidepressants since 14 yoa.  Has tried multiple antidepressants, but SSRIs lose their effect afer 6 months.  Effexor caused severe acid reflux, causing her to miss work.  Does not want Elavil for HA ppx 2/2 constipation.  She usually goes back to taking Wellbutrin which helps with the least amount of side effects.  Mood is good on Wellbutrin 150; is happy with the dose. Denies insomnia.     Migraine with aura:   H/o bitemporal behind both eyes, bioccipatal headaches since 14 yoa, lasting for days, worsened after starting her job.  Imitrex helped in the past (requests brand name Imitrex due to family history of anaphylaxis to sumatriptan).  Has a strong family history of migraines.  Her mom is allergic to Botox injections, so she is hesitant to try Botox.      It is associated with photophobia, phonophobia and nausea.  Tylenol, NSAIDs have not helped in the past.  Triggers include lack of sleep and stress, fluorescent lights.  There is aura with flashing lights.  On the IUD; OBGYN added OCPs due to severe pain from endometriosis.  Takes Zyrtec daily for urticaria.  Denies rhinorrhea, but with eye tearing not always associated with HA.  Her father also has cluster headache.    Had coverage issues with insurance; rizatriptan was too expensive.  Neurology  switched to Fioricet.  No more migraines now that she doesn't have her menstrual cycle.     Acne vulgaris:    Follows with Dermatology.  Was prescribed Aldactone and clindamycin/tretinoin gel qHS.  Does apply sunscreen that is included in her makeup. On IUD with OCPs.    Endometriosis:    Has excruciating suprapubic cramps every month to the point that she would like a hysterectomy.  Is on the IUD and has had lighter periods.  Underwent diagnostic laparoscopy for endometriosis, excision of endometrioma.  Was prescribed clindamycin for cellulitis at the incision, left lower quadrant, but developed facial edema.  Wound has healed.  Still getting severe pain without improvement, but no longer having her period now that OCP was added to IUD.  Has f/u with Dr. Thompson.    Family history of breast cancer:  MGM, 2 maternal aunts with breast cancer; the youngest was that age 35. PGM with ovarian cancer.  No one in her family has been checked for BRCA mutation.    Urticaria:  Takes Claritin daily for hives.  Has not seen an allergist due to cost.    Heart Murmur:  Murmur has been present since childhood.  Has sharp chest pain about once a week lasting for about a second without SOB, ANN, lightheadedness, dizziness.  Is associated with caffeine intake.    Review of Systems   Constitutional: Negative for activity change and unexpected weight change.   HENT: Negative for hearing loss, rhinorrhea and trouble swallowing.    Eyes: Negative for discharge and visual disturbance.   Respiratory: Negative for chest tightness and wheezing.    Cardiovascular: Negative for chest pain and palpitations.   Gastrointestinal: Negative for blood in stool, constipation, diarrhea and vomiting.   Endocrine: Negative for polydipsia and polyuria.   Genitourinary: Positive for menstrual problem. Negative for difficulty urinating, dysuria and hematuria.   Musculoskeletal: Negative for arthralgias, joint swelling and neck pain.   Skin: Positive for rash.  "Negative for wound.        Chronic urticaria   Neurological: Positive for headaches. Negative for weakness.   Hematological: Negative for adenopathy.   Psychiatric/Behavioral: Negative for confusion and dysphoric mood.       I personally reviewed Past Medical History, Past Surgical History, Social History, and Family History.    Objective:      Vitals:    07/22/19 1109   BP: 122/88   Pulse: 103   SpO2: 99%   Weight: 59.5 kg (131 lb 2.8 oz)   Height: 5' 3" (1.6 m)      Physical Exam   Constitutional: She appears well-developed and well-nourished. No distress.   HENT:   Head: Normocephalic and atraumatic.   Nose: Nose normal.   Mouth/Throat: Oropharynx is clear and moist. No oropharyngeal exudate.   Eyes: Pupils are equal, round, and reactive to light. Conjunctivae and EOM are normal. Right eye exhibits no discharge. Left eye exhibits no discharge. No scleral icterus.   Neck: Neck supple. No tracheal deviation present. No thyromegaly present.   Cardiovascular: Normal rate, regular rhythm and intact distal pulses.   No murmur heard.  Pulmonary/Chest: Effort normal and breath sounds normal. No respiratory distress. She has no wheezes.   Abdominal: Soft. Bowel sounds are normal. There is no tenderness.   Well-healed port wounds without erythema, induration, drainage   Musculoskeletal: She exhibits no edema or deformity.   Lymphadenopathy:     She has no cervical adenopathy.   Neurological: She is alert. No cranial nerve deficit. Gait normal.   Skin: Skin is warm and dry. Capillary refill takes less than 2 seconds. No rash noted. She is not diaphoretic. No erythema.   Psychiatric: She has a normal mood and affect. Her behavior is normal.         Lab Results   Component Value Date    WBC 5.04 03/28/2019    HGB 14.5 03/28/2019    HCT 41.6 03/28/2019     03/28/2019    CHOL 148 03/28/2019    TRIG 82 03/28/2019    HDL 59 03/28/2019    ALT 12 03/28/2019    AST 14 03/28/2019     03/28/2019    K 3.6 03/28/2019    CL " 106 03/28/2019    CREATININE 0.9 04/30/2019    BUN 6 03/28/2019    CO2 26 03/28/2019    TSH 0.891 03/28/2019    HGBA1C 4.4 03/28/2019       The ASCVD Risk score (Kiana ENRIQUE Jr., et al., 2013) failed to calculate for the following reasons:    The 2013 ASCVD risk score is only valid for ages 40 to 79    (Imaging have been independently reviewed)  MRI brain ordered    Assessment:       1. Moderate episode of recurrent major depressive disorder    2. Migraine with aura and without status migrainosus, not intractable    3. Acne vulgaris    4. Endometriosis    5. Family history of breast cancer    6. Urticaria    7. Heart murmur    8. Need for tetanus, diphtheria, and acellular pertussis (Tdap) vaccine          Plan:       Delaney was seen today for follow-up.    Diagnoses and all orders for this visit:    Moderate episode of recurrent major depressive disorder  Comments:  Doing well on Wellbutrin 150, cont.   Orders:  -     buPROPion (WELLBUTRIN XL) 150 MG TB24 tablet; Take 1 tablet (150 mg total) by mouth once daily.    Migraine with aura and without status migrainosus, not intractable  Comments:  Cont f/u with Neurology for severe migraines, improved off her menstrual cycle. On Fioricet PRN. MRI brain ordered. Defer OCPs to Neuro, OBGYN    Acne vulgaris  Comments:  Follows with Derm. On Aldactone, clinda/tretinoin gel qHS. Advised sunscreen.  Orders:  -     spironolactone (ALDACTONE) 100 MG tablet; Take 1 tablet (100 mg total) by mouth every evening. Start with 1 po qday, increase to 2 po qday as tolerated    Endometriosis  Comments:  Abd wall cellulitis resolved.  On IUD+OCPs. Migraine with Aura, but severe menstrual pain; consider d/c OCPs if able. Defer to OBGYN, Neuro.   Orders:  -     norethindrone (AYGESTIN) 5 mg Tab; Take 1 tablet (5 mg total) by mouth once daily.    Family history of breast cancer  Comments:  Moderate risk. 2nd degree relatives with early breast ca. Refer to breast clinic for BRCA  testing/counseling. Otherwise, start MMG at age 38  Orders:  -     Ambulatory Referral to Breast Surgery    Urticaria  Comments:  On Claritin daily.  No acute issues.    Heart murmur  Comments:  Since childhood.  Very transient sharp CP without other associated symptoms. Assoc with caffeine. If more persistent or increased duration, will pursue TTE    Need for tetanus, diphtheria, and acellular pertussis (Tdap) vaccine  -     (In Office Administered) Tdap Vaccine         Notification of Lab Results: [facility]    Side effects of medication(s) were discussed in detail and patient voiced understanding.  Patient will call back for any issues or complications.     RTC in 1 year or sooner PRN for depression.

## 2019-07-22 ENCOUNTER — OFFICE VISIT (OUTPATIENT)
Dept: INTERNAL MEDICINE | Facility: CLINIC | Age: 29
End: 2019-07-22
Payer: COMMERCIAL

## 2019-07-22 VITALS
BODY MASS INDEX: 23.25 KG/M2 | DIASTOLIC BLOOD PRESSURE: 88 MMHG | HEART RATE: 103 BPM | HEIGHT: 63 IN | WEIGHT: 131.19 LBS | OXYGEN SATURATION: 99 % | SYSTOLIC BLOOD PRESSURE: 122 MMHG

## 2019-07-22 DIAGNOSIS — N80.9 ENDOMETRIOSIS: ICD-10-CM

## 2019-07-22 DIAGNOSIS — Z23 NEED FOR TETANUS, DIPHTHERIA, AND ACELLULAR PERTUSSIS (TDAP) VACCINE: ICD-10-CM

## 2019-07-22 DIAGNOSIS — G43.109 MIGRAINE WITH AURA AND WITHOUT STATUS MIGRAINOSUS, NOT INTRACTABLE: ICD-10-CM

## 2019-07-22 DIAGNOSIS — L70.0 ACNE VULGARIS: ICD-10-CM

## 2019-07-22 DIAGNOSIS — F33.1 MODERATE EPISODE OF RECURRENT MAJOR DEPRESSIVE DISORDER: Primary | ICD-10-CM

## 2019-07-22 DIAGNOSIS — L50.9 URTICARIA: ICD-10-CM

## 2019-07-22 DIAGNOSIS — R01.1 HEART MURMUR: ICD-10-CM

## 2019-07-22 DIAGNOSIS — Z80.3 FAMILY HISTORY OF BREAST CANCER: ICD-10-CM

## 2019-07-22 PROCEDURE — 99395 PR PREVENTIVE VISIT,EST,18-39: ICD-10-PCS | Mod: 25,S$GLB,, | Performed by: INTERNAL MEDICINE

## 2019-07-22 PROCEDURE — 99395 PREV VISIT EST AGE 18-39: CPT | Mod: 25,S$GLB,, | Performed by: INTERNAL MEDICINE

## 2019-07-22 PROCEDURE — 90715 TDAP VACCINE GREATER THAN OR EQUAL TO 7YO IM: ICD-10-PCS | Mod: S$GLB,,, | Performed by: INTERNAL MEDICINE

## 2019-07-22 PROCEDURE — 90471 TDAP VACCINE GREATER THAN OR EQUAL TO 7YO IM: ICD-10-PCS | Mod: S$GLB,,, | Performed by: INTERNAL MEDICINE

## 2019-07-22 PROCEDURE — 90715 TDAP VACCINE 7 YRS/> IM: CPT | Mod: S$GLB,,, | Performed by: INTERNAL MEDICINE

## 2019-07-22 PROCEDURE — 90471 IMMUNIZATION ADMIN: CPT | Mod: S$GLB,,, | Performed by: INTERNAL MEDICINE

## 2019-07-22 PROCEDURE — 99999 PR PBB SHADOW E&M-EST. PATIENT-LVL IV: CPT | Mod: PBBFAC,,, | Performed by: INTERNAL MEDICINE

## 2019-07-22 PROCEDURE — 99999 PR PBB SHADOW E&M-EST. PATIENT-LVL IV: ICD-10-PCS | Mod: PBBFAC,,, | Performed by: INTERNAL MEDICINE

## 2019-07-22 RX ORDER — SPIRONOLACTONE 100 MG/1
100 TABLET, FILM COATED ORAL NIGHTLY
Qty: 90 TABLET | Refills: 3 | Status: SHIPPED | OUTPATIENT
Start: 2019-07-22 | End: 2020-04-13 | Stop reason: SDUPTHER

## 2019-07-22 RX ORDER — FLUCONAZOLE 150 MG/1
TABLET ORAL
Refills: 2 | COMMUNITY
Start: 2019-06-04 | End: 2019-07-22

## 2019-07-22 RX ORDER — NORETHINDRONE 5 MG/1
5 TABLET ORAL DAILY
Qty: 30 TABLET | Refills: 1 | Status: SHIPPED | OUTPATIENT
Start: 2019-07-22 | End: 2020-04-03 | Stop reason: SDUPTHER

## 2019-07-22 RX ORDER — BUPROPION HYDROCHLORIDE 150 MG/1
150 TABLET ORAL DAILY
Qty: 90 TABLET | Refills: 3 | Status: SHIPPED | OUTPATIENT
Start: 2019-07-22 | End: 2020-04-07 | Stop reason: SDUPTHER

## 2019-07-22 NOTE — PROGRESS NOTES
"Patient was given vaccine information sheet for the Tdap immunization. The area of injection was palpated using the acromion process as a landmark. This area was cleaned with alcohol. Using a 25g 1" safety needle, 0.5mL of the vaccine was placed into the left deltoid muscle. The injection site was dressed with a bandage. Patient experienced no complications and was discharged in stable condition. Tdap Lot: [id number] Exp: [id number]    "

## 2019-08-19 NOTE — PROGRESS NOTES
Patient ID: Delaney Arechiga is a 29 y.o. female.    Chief Complaint: Genetic Evaluation (New Patient High Risk Family Hx Breast Cancer .)            HPI:  New patient presents today for genetic counseling.  It was unclear as to whether she was here for high-risk counseling or genetic counseling, and she preferred to proceed with the latter today and return at a later date for high-risk counseling.  Referred by Ale Quinn MD.     Personal Pertinent History:    No history of cancer or breast biopsy/surgery.  No history of genetic testing.  History of 1 colon polyp--benign, per patient.  Uterus and ovaries intact.     Family Oncologic History:     Patient denies Ashkenazi Spiritism ancestry.  Patient reports Sami, Malian, Sicilian, and Luxem[place]ish ancestry.  Patient's mother has a BRCA mutation (BRCA1 vs BRCA 2 unknown); patient does not have a copy of her mother's results report but states she can likely obtain this.  No other relatives with genetic testing known to patient.  See pedigree (will be scanned into Epic) for full family cancer history.        Review of Systems - See HPI.  Objective:   Physical Exam   Constitutional: She appears well-developed and well-nourished. No distress.   Cardiovascular: Normal rate.    Pulmonary/Chest: Effort normal.   Neurological: She is alert.   Psychiatric: She has a normal mood and affect. Her speech is normal and behavior is normal. Thought content normal.   Assessment:       1. Encounter for nonprocreative genetic counseling    2. Family history of breast cancer    3. Family history of BRCA gene positive    4. Family history of pancreatic cancer    5. Family history of ovarian cancer    6. Family history of prostate cancer        Counseling:     Discussed the following with patient today:    Based on the information provided to me by the patient today, the patient meets criteria for genetic testing for BRCA-Related Breast and/or Ovarian Cancer Syndrome based on the NCCN  Guidelines Version 3.2019.  There is reportedly an identified pathogenic BRCA mutation on her maternal side (patient's mother), and her paternal family history is also suggestive of a possible hereditary cancer syndrome.  Because of the latter, I recommend multigene testing in lieu of single-site analysis, and patient agrees to proceed with multigene testing.  The implications of both were discussed.  Discussed the limitations of genetic testing first in an individual unaffected by cancer rather than first in a relative affected by cancer.      Discussed that relative should speak with their providers regarding genetic counseling/testing.    Discussed the role of tumor suppressor genes.  Discussed hereditary cancer versus familial clustering of cancers versus sporadic cancers.      Discussed the possible results of genetic testing:  Positive for mother's BRCA mutation, positive for other mutation, negative, variant of uncertain significance (VUS).  Discussed the implications of each.      Discussed that specific cancer risks vary depending upon the tumor suppressor gene in which there is a mutation.      Discussed with patient that if she is positive for a mutation, her first-degree relatives each have a 50% chance of having the same mutation.      Discussed that the Genetic Information Nondiscrimination Act (DOMINIK) prohibits most health insurance agencies from discrimination of an individual based on genetic test results.  Discussed that DOMINIK does not protect individuals with regard to other types of policies (including but not limited to life insurance, disability, and long-term care insurance).     Discussed the cost of testing with potential for out-of-pocket costs.  Informed patient that an outside laboratory would perform the testing, with various labs being available for this purpose, after a blood sample is collected at [facility]s lab.      Discussed that it is imperative to obtain a copy of patient's mother's  genetic testing results report, or at the very least to know which laboratory through which her mother underwent testing, prior to submitting patient's genetic testing sample.  Patient will try to obtain the report and will submit it to me or notify me if she is unable to do so.    Strongly recommended testing for this patient.     Patient was offered testing today versus deferring testing at this time versus declining testing.  She desires to defer testing to attempt to obtain her mother's genetic testing results and will return tomorrow for sample collection if possible.    Informed patient that results can take several weeks.  Post-test counseling, including a tailored cancer prevention plan and other patient education, incorporating recommendations for testing of family members, will be reviewed with the patient once genetic testing results are available.      Questions were encouraged and answered to patient's satisfaction, and patient verbalized understanding of information and agreement with the plan.       Plan:      [facility] to schedule patient for genetic testing sample collection for tomorrow.    Patient to keep me informed as to her mother's genetic testing results or lack of ability to obtain these.  In the event of the latter, will still order a comprehensive gene panel for the patient, likely through [facility], which we discussed today.     Post-test genetic counseling is recommended for this patient once genetic testing results are available.     [facility] to schedule patient for high-risk counseling/breast cancer screening visit with me for near future.     Time in counselin minutes  Total time:  27 minutes  100% of visit was spent in face-to-face counseling.

## 2019-08-20 ENCOUNTER — PATIENT MESSAGE (OUTPATIENT)
Dept: SURGERY | Facility: CLINIC | Age: 29
End: 2019-08-20

## 2019-08-20 ENCOUNTER — OFFICE VISIT (OUTPATIENT)
Dept: SURGERY | Facility: CLINIC | Age: 29
End: 2019-08-20
Payer: COMMERCIAL

## 2019-08-20 VITALS
HEART RATE: 85 BPM | HEIGHT: 63 IN | BODY MASS INDEX: 22.68 KG/M2 | SYSTOLIC BLOOD PRESSURE: 139 MMHG | TEMPERATURE: 99 F | WEIGHT: 128 LBS | DIASTOLIC BLOOD PRESSURE: 90 MMHG

## 2019-08-20 DIAGNOSIS — Z80.41 FAMILY HISTORY OF OVARIAN CANCER: ICD-10-CM

## 2019-08-20 DIAGNOSIS — Z80.42 FAMILY HISTORY OF PROSTATE CANCER: ICD-10-CM

## 2019-08-20 DIAGNOSIS — Z80.0 FAMILY HISTORY OF PANCREATIC CANCER: ICD-10-CM

## 2019-08-20 DIAGNOSIS — Z71.83 ENCOUNTER FOR NONPROCREATIVE GENETIC COUNSELING: Primary | ICD-10-CM

## 2019-08-20 DIAGNOSIS — Z80.3 FAMILY HISTORY OF BREAST CANCER: ICD-10-CM

## 2019-08-20 DIAGNOSIS — Z84.81 FAMILY HISTORY OF BRCA GENE POSITIVE: ICD-10-CM

## 2019-08-20 PROCEDURE — 99999 PR PBB SHADOW E&M-EST. PATIENT-LVL III: ICD-10-PCS | Mod: PBBFAC,,, | Performed by: NURSE PRACTITIONER

## 2019-08-20 PROCEDURE — 3008F BODY MASS INDEX DOCD: CPT | Mod: CPTII,S$GLB,, | Performed by: NURSE PRACTITIONER

## 2019-08-20 PROCEDURE — 99202 PR OFFICE/OUTPT VISIT, NEW, LEVL II, 15-29 MIN: ICD-10-PCS | Mod: S$GLB,,, | Performed by: NURSE PRACTITIONER

## 2019-08-20 PROCEDURE — 99999 PR PBB SHADOW E&M-EST. PATIENT-LVL III: CPT | Mod: PBBFAC,,, | Performed by: NURSE PRACTITIONER

## 2019-08-20 PROCEDURE — 3008F PR BODY MASS INDEX (BMI) DOCUMENTED: ICD-10-PCS | Mod: CPTII,S$GLB,, | Performed by: NURSE PRACTITIONER

## 2019-08-20 PROCEDURE — 99202 OFFICE O/P NEW SF 15 MIN: CPT | Mod: S$GLB,,, | Performed by: NURSE PRACTITIONER

## 2019-08-20 NOTE — LETTER
August 20, 2019      Ale Quinn MD  [address]  [address]  [facility] [address]           [facility]  [address], [address]  [facility] [address]  Phone: [phone]  Fax: [phone]          Patient: Delaney Arechiga   MR Number: [id number]   YOB: 1990   Date of Visit: 8/20/2019       Dear Dr. Ale Quinn:    Thank you for referring Delaney Arechiga to me for evaluation. Attached you will find relevant portions of my assessment and plan of care.    If you have questions, please do not hesitate to call me. I look forward to following Delaney Arechiga along with you.    Sincerely,    Jean-Paul Rucker, [facility]    Enclosure  CC:  No Recipients    If you would like to receive this communication electronically, please contact [email or web address] or [phone] to request more information on [employer] access.    For providers and/or their staff who would like to refer a patient to [facility], please contact us through our one-stop-shop provider referral line, [facility], at [phone].    If you feel you have received this communication in error or would no longer like to receive these types of communications, please e-mail [email or web address]

## 2019-08-21 ENCOUNTER — CLINICAL SUPPORT (OUTPATIENT)
Dept: SURGERY | Facility: CLINIC | Age: 29
End: 2019-08-21
Payer: COMMERCIAL

## 2019-08-21 ENCOUNTER — LAB VISIT (OUTPATIENT)
Dept: LAB | Facility: HOSPITAL | Age: 29
End: 2019-08-21
Payer: COMMERCIAL

## 2019-08-21 DIAGNOSIS — Z80.41 FAMILY HISTORY OF OVARIAN CANCER: ICD-10-CM

## 2019-08-21 DIAGNOSIS — Z80.42 FAMILY HISTORY OF PROSTATE CANCER: ICD-10-CM

## 2019-08-21 DIAGNOSIS — Z80.0 FAMILY HISTORY OF PANCREATIC CANCER: ICD-10-CM

## 2019-08-21 DIAGNOSIS — Z80.3 FAMILY HISTORY OF BREAST CANCER: ICD-10-CM

## 2019-08-21 DIAGNOSIS — Z80.3 FAMILY HISTORY OF BREAST CANCER: Primary | ICD-10-CM

## 2019-08-21 DIAGNOSIS — Z80.41 FAMILY HISTORY OF OVARIAN CANCER: Primary | ICD-10-CM

## 2019-08-21 PROCEDURE — 99999 PR PBB SHADOW E&M-EST. PATIENT-LVL I: ICD-10-PCS | Mod: PBBFAC,,, | Performed by: NURSE PRACTITIONER

## 2019-08-21 PROCEDURE — 36415 COLL VENOUS BLD VENIPUNCTURE: CPT

## 2019-08-21 PROCEDURE — 99999 PR PBB SHADOW E&M-EST. PATIENT-LVL I: CPT | Mod: PBBFAC,,, | Performed by: NURSE PRACTITIONER

## 2019-08-21 NOTE — PROGRESS NOTES
Patient in clinic to sign form(s) and  test kit for genetic testing.  Patient provided her mother's name and  which were written on the results report, with patient's permission obtained prior, that will be submitted to [employer].  Advised patient to notify me once it is determined whether mother had DICER1 VUS or pathogenic mutation.

## 2019-08-22 ENCOUNTER — OFFICE VISIT (OUTPATIENT)
Dept: OBSTETRICS AND GYNECOLOGY | Facility: CLINIC | Age: 29
End: 2019-08-22
Payer: COMMERCIAL

## 2019-08-22 VITALS
BODY MASS INDEX: 22.54 KG/M2 | WEIGHT: 127.19 LBS | DIASTOLIC BLOOD PRESSURE: 76 MMHG | SYSTOLIC BLOOD PRESSURE: 124 MMHG | HEIGHT: 63 IN

## 2019-08-22 DIAGNOSIS — N94.6 DYSMENORRHEA: ICD-10-CM

## 2019-08-22 DIAGNOSIS — R10.2 FEMALE PELVIC PAIN: Primary | ICD-10-CM

## 2019-08-22 PROCEDURE — 3008F BODY MASS INDEX DOCD: CPT | Mod: CPTII,S$GLB,, | Performed by: OBSTETRICS & GYNECOLOGY

## 2019-08-22 PROCEDURE — 3008F PR BODY MASS INDEX (BMI) DOCUMENTED: ICD-10-PCS | Mod: CPTII,S$GLB,, | Performed by: OBSTETRICS & GYNECOLOGY

## 2019-08-22 PROCEDURE — 99999 PR PBB SHADOW E&M-EST. PATIENT-LVL III: CPT | Mod: PBBFAC,,, | Performed by: OBSTETRICS & GYNECOLOGY

## 2019-08-22 PROCEDURE — 99999 PR PBB SHADOW E&M-EST. PATIENT-LVL III: ICD-10-PCS | Mod: PBBFAC,,, | Performed by: OBSTETRICS & GYNECOLOGY

## 2019-08-22 PROCEDURE — 99213 OFFICE O/P EST LOW 20 MIN: CPT | Mod: S$GLB,,, | Performed by: OBSTETRICS & GYNECOLOGY

## 2019-08-22 PROCEDURE — 99213 PR OFFICE/OUTPT VISIT, EST, LEVL III, 20-29 MIN: ICD-10-PCS | Mod: S$GLB,,, | Performed by: OBSTETRICS & GYNECOLOGY

## 2019-08-22 RX ORDER — MELOXICAM 15 MG/1
15 TABLET ORAL DAILY
Qty: 30 TABLET | Refills: 3 | Status: ON HOLD | OUTPATIENT
Start: 2019-08-22 | End: 2019-12-13 | Stop reason: HOSPADM

## 2019-08-22 NOTE — PROGRESS NOTES
Subjective:       Patient ID: Delaney Arechiga is a 29 y.o. female.    Chief Complaint:  Pelvic Pain      History of Present Illness  HPI  Pt is 29 y.o. here for re-evaluation of her pelvic pain.  States that after surgery, she is not in pain all the time anymore.  But it does happen every other week.  It seems to happen around the time of ovulation administration.  When it happens the pain is a 9/10.  It gets so bad that she cannot get up.  The pain will last for 2-3 days.  She will try anti-inflammatories with marginal improvement.  It really feels like uterine cramping.    GYN & OB History  Patient's last menstrual period was 2019 (approximate).   Date of Last Pap: 4/3/2019    OB History    Para Term  AB Living   0 0 0 0 0 0   SAB TAB Ectopic Multiple Live Births   0 0 0 0 0       Review of Systems  Review of Systems   Constitutional: Negative for activity change, appetite change and fatigue.   HENT: Negative.  Negative for tinnitus.    Eyes: Negative.    Respiratory: Negative for cough and shortness of breath.    Cardiovascular: Negative for chest pain and palpitations.   Gastrointestinal: Negative.  Negative for abdominal pain, blood in stool, constipation, diarrhea and nausea.   Endocrine: Negative.  Negative for hot flashes.   Genitourinary: Positive for dysmenorrhea and pelvic pain. Negative for dyspareunia, dysuria, frequency, menstrual problem, vaginal discharge, urinary incontinence and postcoital bleeding.   Musculoskeletal: Negative for back pain and joint swelling.   Integumentary:  Negative for rash, breast mass, nipple discharge and breast skin changes.   Neurological: Negative.  Negative for headaches.   Hematological: Negative.  Does not bruise/bleed easily.   Psychiatric/Behavioral: The patient is not nervous/anxious.    Breast: negative.  Negative for mass, nipple discharge and skin changes          Objective:    Physical Exam       Assessment:        1. Female pelvic  pain    2. Dysmenorrhea                Plan:      Delaney was seen today for pelvic pain.    Diagnoses and all orders for this visit:    Female pelvic pain  -     meloxicam (MOBIC) 15 MG tablet; Take 1 tablet (15 mg total) by mouth once daily.  We had a long discussion about the dysmenorrhea that she is experiencing.  It is central and right in her uterus.  She is pretty serious about wanting a hysterectomy for this.  She clearly does not want any children in the future.  I advised the patient of 1st try the Mobic and see if that helps with the dysmenorrhea.  If this is not a solution, we can then consider hysterectomy.  She has tried other medical therapy without any relief.  She does currently have a Mirena.  Patient is well informed and she has thought about this for quite some time.  The pain does debilitated her and is impacted her quality of life.    We spent about 35 min talking about the different options for treating this and what it would take to proceed to hysterectomy.  All questions were answered  Dysmenorrhea  -     meloxicam (MOBIC) 15 MG tablet; Take 1 tablet (15 mg total) by mouth once daily.

## 2019-09-06 ENCOUNTER — DOCUMENTATION ONLY (OUTPATIENT)
Dept: SURGERY | Facility: CLINIC | Age: 29
End: 2019-09-06

## 2019-09-06 NOTE — PROGRESS NOTES
Called pt to provide post-test genetic counseling.  No answer; left VM with my contact info, and asked pt to return my call.    When pt and I speak, I plan to discuss the following:    Her results were negative for any clinically significant mutation in the [facility] Common Hereditary Cancers panel, meaning that no clinically significant mutation was identified in any gene tested; however, a variant of uncertain significance (VUS) was identified in the each of the following genes:  DICER1 (c.5504A>G, heterozygous), RAD51C (c.477T>G, heterozygous), and SDHB (c.178A>G, heterozygous).  A VUS indicates that amino acids within the gene are lining up in a way different from usual, but there is not presently enough data for laboratories to make a determination as to whether the specific variant is benign or pathogenic.  If [facility] reclassifies any of the VUSs as benign or pathogenic, at that time, [facility] will provide notification; therefore, it is important for the patient to keep her demographics up to date with [facility] so she can be contacted if [facility] contacts me.  [facility] does not offer familial VUS testing for any of the patient's VUSs.  The patient can visit [facility]'s Patient Insights Network by visiting [email or web address].  Patient's results do not completely rule out the possibility of a hereditary cancer.  Patient is to continue following up with all providers as they have indicated to her, including but not limited to here at the breast center.  A copy of her genetic testing results will be mailed to her, and she should contact us if not received.  Patient's relatives affected with cancer and those relatives' close blood-relatives should speak with a provider regarding genetic counseling/testing, and I am available for this purpose, or they can visit [email or web address] to locate a genetic counselor.

## 2019-09-11 ENCOUNTER — TELEPHONE (OUTPATIENT)
Dept: SURGERY | Facility: CLINIC | Age: 29
End: 2019-09-11

## 2019-09-11 NOTE — TELEPHONE ENCOUNTER
Called pt to provide post-test genetic counseling.  No answer; left VM with my contact info, and asked pt to return my call.     When pt and I speak, I plan to discuss the following:    Her results were negative for any clinically significant mutation in the [employer] Common Hereditary Cancers panel, meaning that no clinically significant mutation was identified in any gene tested; however, a variant of uncertain significance (VUS) was identified in the each of the following genes:  DICER1 (c.5504A>G, heterozygous), RAD51C (c.477T>G, heterozygous), and SDHB (c.178A>G, heterozygous).  A VUS indicates that amino acids within the gene are lining up in a way different from usual, but there is not presently enough data for laboratories to make a determination as to whether the specific variant is benign or pathogenic.  If [facility] reclassifies any of the VUSs as benign or pathogenic, at that time, [facility] will provide notification; therefore, it is important for the patient to keep her demographics up to date with [facility] so she can be contacted if [facility] contacts me.  [facility] does not offer familial VUS testing for any of the patient's VUSs.  Patient's results do not completely rule out the possibility of a hereditary cancer.  Patient is to continue following up with all providers as they have indicated to her, including but not limited to here at the breast center, where she is scheduled for a high-risk evaluation on 10/8/19.  A copy of her genetic testing results will be mailed to her, and she should contact us if not received.  Patient's relatives affected with cancer and those relatives' close blood-relatives should speak with a provider regarding genetic counseling/testing, and I am available for this purpose, or they can visit [email or web address] to locate a genetic counselor.    The patient can visit [employer]'s Patient Insights Network by visiting [email or web address].

## 2019-09-27 ENCOUNTER — PATIENT MESSAGE (OUTPATIENT)
Dept: SURGERY | Facility: CLINIC | Age: 29
End: 2019-09-27

## 2019-09-27 ENCOUNTER — TELEPHONE (OUTPATIENT)
Dept: SURGERY | Facility: CLINIC | Age: 29
End: 2019-09-27

## 2019-09-27 NOTE — TELEPHONE ENCOUNTER
Called pt to discuss genetic testing results.  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  A VUS indicates that amino acids within the gene are lining up in a way different from usual, but there is not presently enough data for laboratories to make a determination as to whether the specific variant is benign or pathogenic.  If [employer] reclassifies any of the VUSs as benign or pathogenic, at that time, [employer] will provide notification; therefore, it is important for the patient to keep her demographics up to date with my office so she can be contacted if Arist[facility] Logice contacts me.  [employer] does not offer familial VUS testing for any of the patient's VUSs.  Patient's results do not completely rule out the possibility of a hereditary cancer.  Patient is to continue following up with all providers as they have indicated to her, including but not limited to here at the breast center, where she is scheduled for a high-risk evaluation on 10/8/19.  A copy of her genetic testing results will be mailed to her, and she should contact us if not received.  Patient's relatives affected with cancer and those relatives' close blood-relatives should speak with a provider regarding genetic counseling/testing.  Patient should update me with with any changes to patient's personal or family history of cancer and with any personal or relative's genetic testing results and check in with me periodically to determine if updated genetic testing is indicated for patient. The patient can visit [employer]'s Patient Insights Network  by visiting [email or web address].    Questions were encouraged and answered to patient's satisfaction, and patient verbalized understanding of information and agreement with the plan.         Messaged patient.

## 2019-10-24 ENCOUNTER — DOCUMENTATION ONLY (OUTPATIENT)
Dept: SURGERY | Facility: CLINIC | Age: 29
End: 2019-10-24

## 2019-10-24 NOTE — Clinical Note
Lizz, I have patient's genetic testing results paperwork for you.  Please mail to patient.  Thanks, Jean-Paul

## 2019-10-24 NOTE — PROGRESS NOTES
Genetic testing results paperwork printed from the [facility] provider portal and given to breast center MA to be mailed to patient.

## 2019-12-10 ENCOUNTER — ANESTHESIA EVENT (OUTPATIENT)
Dept: SURGERY | Facility: OTHER | Age: 29
DRG: 854 | End: 2019-12-10
Payer: COMMERCIAL

## 2019-12-10 ENCOUNTER — HOSPITAL ENCOUNTER (INPATIENT)
Facility: OTHER | Age: 29
LOS: 3 days | Discharge: HOME | DRG: 854 | End: 2019-12-13
Attending: EMERGENCY MEDICINE | Admitting: INTERNAL MEDICINE
Payer: COMMERCIAL

## 2019-12-10 ENCOUNTER — ANESTHESIA (OUTPATIENT)
Dept: SURGERY | Facility: OTHER | Age: 29
DRG: 854 | End: 2019-12-10
Payer: COMMERCIAL

## 2019-12-10 DIAGNOSIS — N12 PYELONEPHRITIS: ICD-10-CM

## 2019-12-10 DIAGNOSIS — N13.5 OBSTRUCTION OF RIGHT URETER: Primary | ICD-10-CM

## 2019-12-10 DIAGNOSIS — N20.1 URETEROLITHIASIS: ICD-10-CM

## 2019-12-10 LAB
ALBUMIN SERPL BCP-MCNC: 4.5 G/DL (ref 3.5–5.2)
ALP SERPL-CCNC: 45 U/L (ref 55–135)
ALT SERPL W/O P-5'-P-CCNC: 10 U/L (ref 10–44)
ANION GAP SERPL CALC-SCNC: 11 MMOL/L (ref 8–16)
AST SERPL-CCNC: 14 U/L (ref 10–40)
B-HCG UR QL: NEGATIVE
BACTERIA #/AREA URNS HPF: ABNORMAL /HPF
BASOPHILS # BLD AUTO: 0.02 K/UL (ref 0–0.2)
BASOPHILS NFR BLD: 0.2 % (ref 0–1.9)
BILIRUB SERPL-MCNC: 1.2 MG/DL (ref 0.1–1)
BILIRUB UR QL STRIP: NEGATIVE
BUN SERPL-MCNC: 17 MG/DL (ref 6–20)
CALCIUM SERPL-MCNC: 9.7 MG/DL (ref 8.7–10.5)
CHLORIDE SERPL-SCNC: 107 MMOL/L (ref 95–110)
CLARITY UR: ABNORMAL
CO2 SERPL-SCNC: 21 MMOL/L (ref 23–29)
COLOR UR: ABNORMAL
CREAT SERPL-MCNC: 0.9 MG/DL (ref 0.5–1.4)
CTP QC/QA: YES
DIFFERENTIAL METHOD: ABNORMAL
EOSINOPHIL # BLD AUTO: 0.3 K/UL (ref 0–0.5)
EOSINOPHIL NFR BLD: 2.2 % (ref 0–8)
ERYTHROCYTE [DISTWIDTH] IN BLOOD BY AUTOMATED COUNT: 10.9 % (ref 11.5–14.5)
EST. GFR  (AFRICAN AMERICAN): >60 ML/MIN/1.73 M^2
EST. GFR  (NON AFRICAN AMERICAN): >60 ML/MIN/1.73 M^2
GLUCOSE SERPL-MCNC: 134 MG/DL (ref 70–110)
GLUCOSE UR QL STRIP: NEGATIVE
HCT VFR BLD AUTO: 42.9 % (ref 37–48.5)
HGB BLD-MCNC: 14.2 G/DL (ref 12–16)
HGB UR QL STRIP: ABNORMAL
HYALINE CASTS #/AREA URNS LPF: 0 /LPF
IMM GRANULOCYTES # BLD AUTO: 0.03 K/UL (ref 0–0.04)
IMM GRANULOCYTES NFR BLD AUTO: 0.3 % (ref 0–0.5)
KETONES UR QL STRIP: ABNORMAL
LEUKOCYTE ESTERASE UR QL STRIP: ABNORMAL
LIPASE SERPL-CCNC: 7 U/L (ref 4–60)
LYMPHOCYTES # BLD AUTO: 0.6 K/UL (ref 1–4.8)
LYMPHOCYTES NFR BLD: 4.7 % (ref 18–48)
MCH RBC QN AUTO: 29.8 PG (ref 27–31)
MCHC RBC AUTO-ENTMCNC: 33.1 G/DL (ref 32–36)
MCV RBC AUTO: 90 FL (ref 82–98)
MICROSCOPIC COMMENT: ABNORMAL
MONOCYTES # BLD AUTO: 0.1 K/UL (ref 0.3–1)
MONOCYTES NFR BLD: 1 % (ref 4–15)
NEUTROPHILS # BLD AUTO: 10.6 K/UL (ref 1.8–7.7)
NEUTROPHILS NFR BLD: 91.6 % (ref 38–73)
NITRITE UR QL STRIP: POSITIVE
NRBC BLD-RTO: 0 /100 WBC
PH UR STRIP: 8 [PH] (ref 5–8)
PLATELET # BLD AUTO: 306 K/UL (ref 150–350)
PMV BLD AUTO: 10.3 FL (ref 9.2–12.9)
POTASSIUM SERPL-SCNC: 4 MMOL/L (ref 3.5–5.1)
PROT SERPL-MCNC: 7.7 G/DL (ref 6–8.4)
PROT UR QL STRIP: ABNORMAL
RBC # BLD AUTO: 4.77 M/UL (ref 4–5.4)
RBC #/AREA URNS HPF: >100 /HPF (ref 0–4)
SODIUM SERPL-SCNC: 139 MMOL/L (ref 136–145)
SP GR UR STRIP: 1.02 (ref 1–1.03)
SQUAMOUS #/AREA URNS HPF: 1 /HPF
URN SPEC COLLECT METH UR: ABNORMAL
UROBILINOGEN UR STRIP-ACNC: 1 EU/DL
WBC # BLD AUTO: 11.61 K/UL (ref 3.9–12.7)
WBC #/AREA URNS HPF: >100 /HPF (ref 0–5)

## 2019-12-10 PROCEDURE — 87186 SC STD MICRODIL/AGAR DIL: CPT

## 2019-12-10 PROCEDURE — 63600175 PHARM REV CODE 636 W HCPCS: Performed by: NURSE ANESTHETIST, CERTIFIED REGISTERED

## 2019-12-10 PROCEDURE — 99223 PR INITIAL HOSPITAL CARE,LEVL III: ICD-10-PCS | Mod: 25,,, | Performed by: UROLOGY

## 2019-12-10 PROCEDURE — 36000707: Performed by: UROLOGY

## 2019-12-10 PROCEDURE — 36000706: Performed by: UROLOGY

## 2019-12-10 PROCEDURE — 96365 THER/PROPH/DIAG IV INF INIT: CPT

## 2019-12-10 PROCEDURE — 37000009 HC ANESTHESIA EA ADD 15 MINS: Performed by: UROLOGY

## 2019-12-10 PROCEDURE — 11000001 HC ACUTE MED/SURG PRIVATE ROOM

## 2019-12-10 PROCEDURE — 63600175 PHARM REV CODE 636 W HCPCS: Performed by: EMERGENCY MEDICINE

## 2019-12-10 PROCEDURE — 99223 1ST HOSP IP/OBS HIGH 75: CPT | Mod: ,,, | Performed by: INTERNAL MEDICINE

## 2019-12-10 PROCEDURE — 87086 URINE CULTURE/COLONY COUNT: CPT

## 2019-12-10 PROCEDURE — 81025 URINE PREGNANCY TEST: CPT | Performed by: PHYSICIAN ASSISTANT

## 2019-12-10 PROCEDURE — 96366 THER/PROPH/DIAG IV INF ADDON: CPT

## 2019-12-10 PROCEDURE — 63600175 PHARM REV CODE 636 W HCPCS: Performed by: ANESTHESIOLOGY

## 2019-12-10 PROCEDURE — 96376 TX/PRO/DX INJ SAME DRUG ADON: CPT

## 2019-12-10 PROCEDURE — C2617 STENT, NON-COR, TEM W/O DEL: HCPCS | Performed by: UROLOGY

## 2019-12-10 PROCEDURE — 83690 ASSAY OF LIPASE: CPT

## 2019-12-10 PROCEDURE — 63600175 PHARM REV CODE 636 W HCPCS: Performed by: PHYSICIAN ASSISTANT

## 2019-12-10 PROCEDURE — C1758 CATHETER, URETERAL: HCPCS | Performed by: UROLOGY

## 2019-12-10 PROCEDURE — 87088 URINE BACTERIA CULTURE: CPT

## 2019-12-10 PROCEDURE — 52332 CYSTOSCOPY AND TREATMENT: CPT | Mod: RT,,, | Performed by: UROLOGY

## 2019-12-10 PROCEDURE — 94761 N-INVAS EAR/PLS OXIMETRY MLT: CPT

## 2019-12-10 PROCEDURE — 85025 COMPLETE CBC W/AUTO DIFF WBC: CPT

## 2019-12-10 PROCEDURE — 25000003 PHARM REV CODE 250: Performed by: INTERNAL MEDICINE

## 2019-12-10 PROCEDURE — 71000033 HC RECOVERY, INTIAL HOUR: Performed by: UROLOGY

## 2019-12-10 PROCEDURE — 99285 EMERGENCY DEPT VISIT HI MDM: CPT | Mod: 25

## 2019-12-10 PROCEDURE — 52332 PR CYSTOSCOPY,INSERT URETERAL STENT: ICD-10-PCS | Mod: RT,,, | Performed by: UROLOGY

## 2019-12-10 PROCEDURE — 81000 URINALYSIS NONAUTO W/SCOPE: CPT

## 2019-12-10 PROCEDURE — 37000008 HC ANESTHESIA 1ST 15 MINUTES: Performed by: UROLOGY

## 2019-12-10 PROCEDURE — 76000 FLUOROSCOPY <1 HR PHYS/QHP: CPT | Mod: 26,59,, | Performed by: UROLOGY

## 2019-12-10 PROCEDURE — 80053 COMPREHEN METABOLIC PANEL: CPT

## 2019-12-10 PROCEDURE — 87077 CULTURE AEROBIC IDENTIFY: CPT | Mod: 59

## 2019-12-10 PROCEDURE — 76000 PR  FLUOROSCOPE EXAMINATION: ICD-10-PCS | Mod: 26,59,, | Performed by: UROLOGY

## 2019-12-10 PROCEDURE — 96361 HYDRATE IV INFUSION ADD-ON: CPT

## 2019-12-10 PROCEDURE — 99223 PR INITIAL HOSPITAL CARE,LEVL III: ICD-10-PCS | Mod: ,,, | Performed by: INTERNAL MEDICINE

## 2019-12-10 PROCEDURE — 87040 BLOOD CULTURE FOR BACTERIA: CPT | Mod: 59

## 2019-12-10 PROCEDURE — C1769 GUIDE WIRE: HCPCS | Performed by: UROLOGY

## 2019-12-10 PROCEDURE — 99223 1ST HOSP IP/OBS HIGH 75: CPT | Mod: 25,,, | Performed by: UROLOGY

## 2019-12-10 PROCEDURE — 25000003 PHARM REV CODE 250: Performed by: NURSE ANESTHETIST, CERTIFIED REGISTERED

## 2019-12-10 PROCEDURE — 63600175 PHARM REV CODE 636 W HCPCS: Performed by: UROLOGY

## 2019-12-10 PROCEDURE — 63600175 PHARM REV CODE 636 W HCPCS: Performed by: INTERNAL MEDICINE

## 2019-12-10 PROCEDURE — 71000039 HC RECOVERY, EACH ADD'L HOUR: Performed by: UROLOGY

## 2019-12-10 PROCEDURE — 96375 TX/PRO/DX INJ NEW DRUG ADDON: CPT

## 2019-12-10 DEVICE — STENT URETERAL UNIV 6FR 26CM: Type: IMPLANTABLE DEVICE | Site: URETER | Status: FUNCTIONAL

## 2019-12-10 RX ORDER — LIDOCAINE HCL/PF 100 MG/5ML
SYRINGE (ML) INTRAVENOUS
Status: DISCONTINUED | OUTPATIENT
Start: 2019-12-10 | End: 2019-12-10

## 2019-12-10 RX ORDER — MIDAZOLAM HYDROCHLORIDE 1 MG/ML
INJECTION INTRAMUSCULAR; INTRAVENOUS
Status: DISCONTINUED | OUTPATIENT
Start: 2019-12-10 | End: 2019-12-10

## 2019-12-10 RX ORDER — SODIUM CHLORIDE 0.9 % (FLUSH) 0.9 %
3 SYRINGE (ML) INJECTION
Status: DISCONTINUED | OUTPATIENT
Start: 2019-12-10 | End: 2019-12-13 | Stop reason: HOSPADM

## 2019-12-10 RX ORDER — ROCURONIUM BROMIDE 10 MG/ML
INJECTION, SOLUTION INTRAVENOUS
Status: DISCONTINUED | OUTPATIENT
Start: 2019-12-10 | End: 2019-12-10

## 2019-12-10 RX ORDER — PHENYLEPHRINE HYDROCHLORIDE 10 MG/ML
INJECTION INTRAVENOUS
Status: DISCONTINUED | OUTPATIENT
Start: 2019-12-10 | End: 2019-12-10

## 2019-12-10 RX ORDER — MEPERIDINE HYDROCHLORIDE 25 MG/ML
12.5 INJECTION INTRAMUSCULAR; INTRAVENOUS; SUBCUTANEOUS ONCE AS NEEDED
Status: COMPLETED | OUTPATIENT
Start: 2019-12-10 | End: 2019-12-10

## 2019-12-10 RX ORDER — ONDANSETRON 2 MG/ML
INJECTION INTRAMUSCULAR; INTRAVENOUS
Status: DISCONTINUED | OUTPATIENT
Start: 2019-12-10 | End: 2019-12-10

## 2019-12-10 RX ORDER — BUPROPION HYDROCHLORIDE 150 MG/1
150 TABLET ORAL DAILY
Status: DISCONTINUED | OUTPATIENT
Start: 2019-12-11 | End: 2019-12-13 | Stop reason: HOSPADM

## 2019-12-10 RX ORDER — PROPOFOL 10 MG/ML
VIAL (ML) INTRAVENOUS
Status: DISCONTINUED | OUTPATIENT
Start: 2019-12-10 | End: 2019-12-10

## 2019-12-10 RX ORDER — KETOROLAC TROMETHAMINE 30 MG/ML
15 INJECTION, SOLUTION INTRAMUSCULAR; INTRAVENOUS EVERY 6 HOURS PRN
Status: DISCONTINUED | OUTPATIENT
Start: 2019-12-10 | End: 2019-12-13 | Stop reason: HOSPADM

## 2019-12-10 RX ORDER — DEXAMETHASONE SODIUM PHOSPHATE 4 MG/ML
INJECTION, SOLUTION INTRA-ARTICULAR; INTRALESIONAL; INTRAMUSCULAR; INTRAVENOUS; SOFT TISSUE
Status: DISCONTINUED | OUTPATIENT
Start: 2019-12-10 | End: 2019-12-10

## 2019-12-10 RX ORDER — NEOSTIGMINE METHYLSULFATE 1 MG/ML
INJECTION, SOLUTION INTRAVENOUS
Status: DISCONTINUED | OUTPATIENT
Start: 2019-12-10 | End: 2019-12-10

## 2019-12-10 RX ORDER — CIPROFLOXACIN 2 MG/ML
400 INJECTION, SOLUTION INTRAVENOUS
Status: DISCONTINUED | OUTPATIENT
Start: 2019-12-10 | End: 2019-12-13 | Stop reason: HOSPADM

## 2019-12-10 RX ORDER — SODIUM CHLORIDE 9 MG/ML
INJECTION, SOLUTION INTRAVENOUS CONTINUOUS
Status: DISCONTINUED | OUTPATIENT
Start: 2019-12-10 | End: 2019-12-13 | Stop reason: HOSPADM

## 2019-12-10 RX ORDER — FENTANYL CITRATE 50 UG/ML
INJECTION, SOLUTION INTRAMUSCULAR; INTRAVENOUS
Status: DISCONTINUED | OUTPATIENT
Start: 2019-12-10 | End: 2019-12-10

## 2019-12-10 RX ORDER — KETOROLAC TROMETHAMINE 30 MG/ML
15 INJECTION, SOLUTION INTRAMUSCULAR; INTRAVENOUS
Status: COMPLETED | OUTPATIENT
Start: 2019-12-10 | End: 2019-12-10

## 2019-12-10 RX ORDER — ONDANSETRON 2 MG/ML
4 INJECTION INTRAMUSCULAR; INTRAVENOUS DAILY PRN
Status: DISCONTINUED | OUTPATIENT
Start: 2019-12-10 | End: 2019-12-10 | Stop reason: HOSPADM

## 2019-12-10 RX ORDER — SODIUM CHLORIDE 0.9 % (FLUSH) 0.9 %
10 SYRINGE (ML) INJECTION
Status: DISCONTINUED | OUTPATIENT
Start: 2019-12-10 | End: 2019-12-13 | Stop reason: HOSPADM

## 2019-12-10 RX ORDER — ONDANSETRON 2 MG/ML
4 INJECTION INTRAMUSCULAR; INTRAVENOUS
Status: COMPLETED | OUTPATIENT
Start: 2019-12-10 | End: 2019-12-10

## 2019-12-10 RX ORDER — GLYCOPYRROLATE 0.2 MG/ML
INJECTION INTRAMUSCULAR; INTRAVENOUS
Status: DISCONTINUED | OUTPATIENT
Start: 2019-12-10 | End: 2019-12-10

## 2019-12-10 RX ORDER — ACETAMINOPHEN 325 MG/1
650 TABLET ORAL EVERY 6 HOURS PRN
Status: DISCONTINUED | OUTPATIENT
Start: 2019-12-10 | End: 2019-12-13 | Stop reason: HOSPADM

## 2019-12-10 RX ORDER — ONDANSETRON 2 MG/ML
4 INJECTION INTRAMUSCULAR; INTRAVENOUS EVERY 6 HOURS PRN
Status: DISCONTINUED | OUTPATIENT
Start: 2019-12-10 | End: 2019-12-13 | Stop reason: HOSPADM

## 2019-12-10 RX ORDER — CIPROFLOXACIN 2 MG/ML
400 INJECTION, SOLUTION INTRAVENOUS
Status: COMPLETED | OUTPATIENT
Start: 2019-12-10 | End: 2019-12-10

## 2019-12-10 RX ORDER — HYDROMORPHONE HYDROCHLORIDE 2 MG/ML
0.4 INJECTION, SOLUTION INTRAMUSCULAR; INTRAVENOUS; SUBCUTANEOUS EVERY 5 MIN PRN
Status: DISCONTINUED | OUTPATIENT
Start: 2019-12-10 | End: 2019-12-10 | Stop reason: HOSPADM

## 2019-12-10 RX ORDER — OXYCODONE HYDROCHLORIDE 5 MG/1
5 TABLET ORAL
Status: DISCONTINUED | OUTPATIENT
Start: 2019-12-10 | End: 2019-12-10 | Stop reason: HOSPADM

## 2019-12-10 RX ORDER — TAMSULOSIN HYDROCHLORIDE 0.4 MG/1
0.4 CAPSULE ORAL DAILY
Status: DISCONTINUED | OUTPATIENT
Start: 2019-12-10 | End: 2019-12-13 | Stop reason: HOSPADM

## 2019-12-10 RX ORDER — SODIUM CHLORIDE, SODIUM LACTATE, POTASSIUM CHLORIDE, CALCIUM CHLORIDE 600; 310; 30; 20 MG/100ML; MG/100ML; MG/100ML; MG/100ML
INJECTION, SOLUTION INTRAVENOUS CONTINUOUS PRN
Status: DISCONTINUED | OUTPATIENT
Start: 2019-12-10 | End: 2019-12-10

## 2019-12-10 RX ADMIN — PHENYLEPHRINE HYDROCHLORIDE 200 MCG: 10 INJECTION INTRAVENOUS at 05:12

## 2019-12-10 RX ADMIN — CARBOXYMETHYLCELLULOSE SODIUM 2 DROP: 2.5 SOLUTION/ DROPS OPHTHALMIC at 05:12

## 2019-12-10 RX ADMIN — MIDAZOLAM HYDROCHLORIDE 2 MG: 1 INJECTION, SOLUTION INTRAMUSCULAR; INTRAVENOUS at 05:12

## 2019-12-10 RX ADMIN — PROPOFOL 180 MG: 10 INJECTION, EMULSION INTRAVENOUS at 05:12

## 2019-12-10 RX ADMIN — DEXAMETHASONE SODIUM PHOSPHATE 8 MG: 4 INJECTION, SOLUTION INTRAMUSCULAR; INTRAVENOUS at 05:12

## 2019-12-10 RX ADMIN — LIDOCAINE HYDROCHLORIDE 50 MG: 20 INJECTION, SOLUTION INTRAVENOUS at 05:12

## 2019-12-10 RX ADMIN — SODIUM CHLORIDE 1000 ML: 0.9 INJECTION, SOLUTION INTRAVENOUS at 11:12

## 2019-12-10 RX ADMIN — GLYCOPYRROLATE 0.8 MG: 0.2 INJECTION, SOLUTION INTRAMUSCULAR; INTRAVENOUS at 06:12

## 2019-12-10 RX ADMIN — SODIUM CHLORIDE: 0.9 INJECTION, SOLUTION INTRAVENOUS at 08:12

## 2019-12-10 RX ADMIN — SUGAMMADEX 200 MG: 100 INJECTION, SOLUTION INTRAVENOUS at 06:12

## 2019-12-10 RX ADMIN — Medication 1000 ML: at 02:12

## 2019-12-10 RX ADMIN — ROCURONIUM BROMIDE 30 MG: 10 INJECTION, SOLUTION INTRAVENOUS at 05:12

## 2019-12-10 RX ADMIN — PROMETHAZINE HYDROCHLORIDE 12.5 MG: 25 INJECTION INTRAMUSCULAR; INTRAVENOUS at 02:12

## 2019-12-10 RX ADMIN — ONDANSETRON 4 MG: 2 INJECTION INTRAMUSCULAR; INTRAVENOUS at 06:12

## 2019-12-10 RX ADMIN — NEOSTIGMINE METHYLSULFATE 5 MG: 1 INJECTION INTRAVENOUS at 06:12

## 2019-12-10 RX ADMIN — MEPERIDINE HYDROCHLORIDE 12.5 MG: 25 INJECTION INTRAMUSCULAR; INTRAVENOUS; SUBCUTANEOUS at 06:12

## 2019-12-10 RX ADMIN — KETOROLAC TROMETHAMINE 15 MG: 30 INJECTION, SOLUTION INTRAMUSCULAR; INTRAVENOUS at 02:12

## 2019-12-10 RX ADMIN — SODIUM CHLORIDE, SODIUM LACTATE, POTASSIUM CHLORIDE, AND CALCIUM CHLORIDE: 600; 310; 30; 20 INJECTION, SOLUTION INTRAVENOUS at 05:12

## 2019-12-10 RX ADMIN — TAMSULOSIN HYDROCHLORIDE 0.4 MG: 0.4 CAPSULE ORAL at 09:12

## 2019-12-10 RX ADMIN — PHENYLEPHRINE HYDROCHLORIDE 100 MCG: 10 INJECTION INTRAVENOUS at 06:12

## 2019-12-10 RX ADMIN — FENTANYL CITRATE 100 MCG: 50 INJECTION, SOLUTION INTRAMUSCULAR; INTRAVENOUS at 05:12

## 2019-12-10 RX ADMIN — CIPROFLOXACIN 400 MG: 2 INJECTION, SOLUTION INTRAVENOUS at 05:12

## 2019-12-10 RX ADMIN — ONDANSETRON 4 MG: 2 INJECTION INTRAMUSCULAR; INTRAVENOUS at 11:12

## 2019-12-10 RX ADMIN — KETOROLAC TROMETHAMINE 15 MG: 30 INJECTION, SOLUTION INTRAMUSCULAR; INTRAVENOUS at 11:12

## 2019-12-10 RX ADMIN — CIPROFLOXACIN 400 MG: 2 INJECTION, SOLUTION INTRAVENOUS at 04:12

## 2019-12-10 RX ADMIN — CIPROFLOXACIN 400 MG: 2 INJECTION, SOLUTION INTRAVENOUS at 09:12

## 2019-12-10 NOTE — HPI
This is a 29 YOF presents to ER via EMS with R flank pain, R abdominal pain and dysuria. She had dysuria a few days ago but took OTC medications that gave her relief. She began to have R flank pain last night but woke up this AM with R flank and abdominal pain that was severe. She reports the dysuria started again as well. She denies gross hematuria. She has felt chills but has not checked her temperature at home. She has not been able to tolerate PO intake. She reports vomiting multiple times since this morning, most recently on the floor of the ED lobby.     CT scan done in ED shows a 3-4mm stone in the proximal Right ureter with associated hydronephrosis.     Upon evaluation, she is afebrile. She appears uncomfortable. She is tachycardic at 107 and her blood pressure monitor currently reads 90s/60s.   No leukocytosis, Cr at baseline.     UA shows many bacteria, >100W, >100R, 1 squam, nitrite positive.     She was given a dose of Cipro in the ED     She denies prior history of kidney stones.

## 2019-12-10 NOTE — CONSULTS
[facility]  Urology  Consult Note    Patient Name: Delaney Arechiga  MRN: [id number]  Admission Date: 12/10/2019  Hospital Length of Stay: 0   Code Status: Prior   Attending Provider: Michelle English MD   Consulting Provider: Cielo Perla MD  Primary Care Physician: Ale Quinn MD  Principal Problem:<principal problem not specified>    Inpatient consult to Urology  Consult performed by: Cielo Perla MD  Consult ordered by: Michelle English MD          Subjective:     HPI:  This is a 29 YOF presents to ER via EMS with R flank pain, R abdominal pain and dysuria. She had dysuria a few days ago but took OTC medications that gave her relief. She began to have R flank pain last night but woke up this AM with R flank and abdominal pain that was severe. She reports the dysuria started again as well. She denies gross hematuria. She has felt chills but has not checked her temperature at home. She has not been able to tolerate PO intake. She reports vomiting multiple times since this morning, most recently on the floor of the ED lobby.     CT scan done in ED shows a 3-4mm stone in the proximal Right ureter with associated hydronephrosis.     Upon evaluation, she is afebrile. She appears uncomfortable. She is tachycardic at 107 and her blood pressure monitor currently reads 90s/60s.   No leukocytosis, Cr at baseline.     UA shows many bacteria, >100W, >100R, 1 squam, nitrite positive.     She was given a dose of Cipro in the ED     She denies prior history of kidney stones.     Past Medical History:   Diagnosis Date    Abnormal Pap smear of cervix 2012    Family history of breast cancer     Heart murmur 3/25/2019    Migraines     PONV (postoperative nausea and vomiting)     S/P diagnostic laparoscopy, excision of endometriosis 5/20/19 5/20/2019    Urticaria        Past Surgical History:   Procedure Laterality Date    BUNIONECTOMY Right     COLPOSCOPY      DIAGNOSTIC LAPAROSCOPY N/A  2019    Procedure: LAPAROSCOPY, DIAGNOSTIC;  Surgeon: Jeet Thompson MD;  Location: [facility] OR;  Service: OB/GYN;  Laterality: N/A;  Fulgeration endometriosis    SURGICAL REMOVAL OF ENDOMETRIOMA  2019    Procedure: EXCISION, ENDOMETRIOMA;  Surgeon: Jeet Thompson MD;  Location: [facility] OR;  Service: OB/GYN;;    TONSILLECTOMY         Review of patient's allergies indicates:   Allergen Reactions    Clindamycin hcl Anaphylaxis, Hives, Itching, Swelling, Dermatitis and Rash    Opioids - morphine analogues Shortness Of Breath, Itching, Nausea And Vomiting, Anxiety and Palpitations     Patient states she can take Norco    Codeine Other (See Comments)     Itching    Codeine-guaifenesin Other (See Comments)     unspecified    Penicillins Hives and Swelling    Soy      hives    Sulfa (sulfonamide antibiotics) Other (See Comments)       Family History     Problem Relation (Age of Onset)    Aortic aneurysm Father    Breast cancer Maternal Grandmother (80), Maternal Aunt (35), Maternal Aunt (40)    Heart failure Maternal Grandfather    Hypertension Mother    Migraines Mother, Father, Sister    Ovarian cancer Paternal Grandmother    Peripheral vascular disease Father    Stroke Mother (67)          Tobacco Use    Smoking status: Former Smoker     Types: Cigarettes     Last attempt to quit: 2018     Years since quittin.9    Smokeless tobacco: Never Used   Substance and Sexual Activity    Alcohol use: Not Currently     Frequency: Never     Drinks per session: Patient refused     Binge frequency: Never    Drug use: Never    Sexual activity: Not Currently     Partners: Male     Birth control/protection: IUD     Comment: Mirena placed 2017       Review of Systems   Constitutional: Positive for chills. Negative for activity change, appetite change, fatigue and fever.   HENT: Negative for facial swelling and hearing loss.    Eyes: Negative for discharge and itching.   Respiratory: Negative for chest  tightness and shortness of breath.    Cardiovascular: Negative for chest pain and leg swelling.   Gastrointestinal: Positive for abdominal pain (R), nausea and vomiting. Negative for abdominal distention, constipation and diarrhea.   Genitourinary: Positive for dysuria and flank pain (R). Negative for hematuria and urgency.   Musculoskeletal: Negative for arthralgias, back pain and neck pain.   Skin: Negative for color change and pallor.   Neurological: Negative for dizziness, facial asymmetry and light-headedness.   Hematological: Negative for adenopathy.   Psychiatric/Behavioral: Negative for agitation and decreased concentration. The patient is not nervous/anxious.        Objective:     Temp:  [97.8 °F (36.6 °C)-99.8 °F (37.7 °C)] 99.8 °F (37.7 °C)  Pulse:  [] 101  Resp:  [18] 18  SpO2:  [97 %-100 %] 99 %  BP: (102-127)/(59-92) 102/66     Body mass index is 22.14 kg/m².    Date 12/10/19 0700 - 12/11/19 0659   Shift 7157-8346 2048-6948 0391-4675 24 Hour Total   INTAKE   IV Piggyback  1050  1050   Shift Total(mL/kg)  1050(18.5)  1050(18.5)   OUTPUT   Shift Total(mL/kg)       Weight (kg) 56.7 56.7 56.7 56.7          Drains     None                 Physical Exam   Constitutional: She is oriented to person, place, and time. She appears well-developed and well-nourished. No distress.   HENT:   Head: Normocephalic and atraumatic.   Eyes: No scleral icterus.   Neck: No tracheal deviation present.   Cardiovascular:    Tachycardic   Pulmonary/Chest: Effort normal. No respiratory distress.   Abdominal: Soft. She exhibits no distension. There is tenderness (R abdominal tenderness on light palpation).   Genitourinary:   Genitourinary Comments: R flank tenderness    Musculoskeletal: Normal range of motion.   Neurological: She is alert and oriented to person, place, and time.   Skin: Skin is warm and dry.     Psychiatric: She has a normal mood and affect. Her behavior is normal.       Significant Labs:    BMP:  Recent  Labs   Lab 12/10/19  1133      K 4.0      CO2 21*   BUN 17   CREATININE 0.9   CALCIUM 9.7       CBC:  Recent Labs   Lab 12/10/19  1133   WBC 11.61   HGB 14.2   HCT 42.9          All pertinent labs results from the past 24 hours have been reviewed.    Significant Imaging:  CT: I have reviewed all results within the past 24 hours and my personal findings are:  as below   I have reviewed the CT scan from 12/10/19   Adrenal glands: unremarkable bilaterally.   Left kidney is free of stones or masses, no hydronephrosis apparent. Left ureter has a normal course and caliber. There are no left ureteral stones present.   Right kidney is free of stones or masses, hydronephrosis associated with proximal right ureteral stone measuring 3-4mm.   Bladder is unremarkable.   Uterus with IUD present.                         Assessment and Plan:     Ureterolithiasis  Will take to OR tonight for R ureteral stent placement   Consent obtained         VTE Risk Mitigation (From admission, onward)    None          Thank you for your consult. I will follow-up with patient. Please contact us if you have any additional questions.    Cielo Perla MD  Urology  [facility]

## 2019-12-10 NOTE — SUBJECTIVE & OBJECTIVE
Past Medical History:   Diagnosis Date    Abnormal Pap smear of cervix     Family history of breast cancer     Heart murmur 3/25/2019    Migraines     PONV (postoperative nausea and vomiting)     S/P diagnostic laparoscopy, excision of endometriosis 19    Urticaria        Past Surgical History:   Procedure Laterality Date    BUNIONECTOMY Right     COLPOSCOPY      DIAGNOSTIC LAPAROSCOPY N/A 2019    Procedure: LAPAROSCOPY, DIAGNOSTIC;  Surgeon: Jeet Thompson MD;  Location: [facility];  Service: OB/GYN;  Laterality: N/A;  Fulgeration endometriosis    SURGICAL REMOVAL OF ENDOMETRIOMA  2019    Procedure: EXCISION, ENDOMETRIOMA;  Surgeon: Jeet Thompson MD;  [facility];  Service: OB/GYN;;    TONSILLECTOMY         Review of patient's allergies indicates:   Allergen Reactions    Clindamycin hcl Anaphylaxis, Hives, Itching, Swelling, Dermatitis and Rash    Opioids - morphine analogues Shortness Of Breath, Itching, Nausea And Vomiting, Anxiety and Palpitations     Patient states she can take Norco    Codeine Other (See Comments)     Itching    Codeine-guaifenesin Other (See Comments)     unspecified    Penicillins Hives and Swelling    Soy      hives    Sulfa (sulfonamide antibiotics) Other (See Comments)       Family History     Problem Relation (Age of Onset)    Aortic aneurysm Father    Breast cancer Maternal Grandmother (80), Maternal Aunt (35), Maternal Aunt (40)    Heart failure Maternal Grandfather    Hypertension Mother    Migraines Mother, Father, Sister    Ovarian cancer Paternal Grandmother    Peripheral vascular disease Father    Stroke Mother (67)          Tobacco Use    Smoking status: Former Smoker     Types: Cigarettes     Last attempt to quit: 2018     Years since quittin.9    Smokeless tobacco: Never Used   Substance and Sexual Activity    Alcohol use: Not Currently     Frequency: Never     Drinks per session: Patient refused     Binge  frequency: Never    Drug use: Never    Sexual activity: Not Currently     Partners: Male     Birth control/protection: IUD     Comment: Mirena placed January 2017       Review of Systems   Constitutional: Positive for chills. Negative for activity change, appetite change, fatigue and fever.   HENT: Negative for facial swelling and hearing loss.    Eyes: Negative for discharge and itching.   Respiratory: Negative for chest tightness and shortness of breath.    Cardiovascular: Negative for chest pain and leg swelling.   Gastrointestinal: Positive for abdominal pain (R), nausea and vomiting. Negative for abdominal distention, constipation and diarrhea.   Genitourinary: Positive for dysuria and flank pain (R). Negative for hematuria and urgency.   Musculoskeletal: Negative for arthralgias, back pain and neck pain.   Skin: Negative for color change and pallor.   Neurological: Negative for dizziness, facial asymmetry and light-headedness.   Hematological: Negative for adenopathy.   Psychiatric/Behavioral: Negative for agitation and decreased concentration. The patient is not nervous/anxious.        Objective:     Temp:  [97.8 °F (36.6 °C)-99.8 °F (37.7 °C)] 99.8 °F (37.7 °C)  Pulse:  [] 101  Resp:  [18] 18  SpO2:  [97 %-100 %] 99 %  BP: (102-127)/(59-92) 102/66     Body mass index is 22.14 kg/m².    Date 12/10/19 0700 - 12/11/19 0659   Shift 6253-5882 1368-7297 8815-3978 24 Hour Total   INTAKE   IV Piggyback  1050  1050   Shift Total(mL/kg)  1050(18.5)  1050(18.5)   OUTPUT   Shift Total(mL/kg)       Weight (kg) 56.7 56.7 56.7 56.7          Drains     None                 Physical Exam   Constitutional: She is oriented to person, place, and time. She appears well-developed and well-nourished. No distress.   HENT:   Head: Normocephalic and atraumatic.   Eyes: No scleral icterus.   Neck: No tracheal deviation present.   Cardiovascular:    Tachycardic   Pulmonary/Chest: Effort normal. No respiratory distress.    Abdominal: Soft. She exhibits no distension. There is tenderness (R abdominal tenderness on light palpation).   Genitourinary:   Genitourinary Comments: R flank tenderness    Musculoskeletal: Normal range of motion.   Neurological: She is alert and oriented to person, place, and time.   Skin: Skin is warm and dry.     Psychiatric: She has a normal mood and affect. Her behavior is normal.       Significant Labs:    BMP:  Recent Labs   Lab 12/10/19  1133      K 4.0      CO2 21*   BUN 17   CREATININE 0.9   CALCIUM 9.7       CBC:  Recent Labs   Lab 12/10/19  1133   WBC 11.61   HGB 14.2   HCT 42.9          All pertinent labs results from the past 24 hours have been reviewed.    Significant Imaging:  CT: I have reviewed all results within the past 24 hours and my personal findings are:  as below   I have reviewed the CT scan from 12/10/19   Adrenal glands: unremarkable bilaterally.   Left kidney is free of stones or masses, no hydronephrosis apparent. Left ureter has a normal course and caliber. There are no left ureteral stones present.   Right kidney is free of stones or masses, hydronephrosis associated with proximal right ureteral stone measuring 3-4mm.   Bladder is unremarkable.   Uterus with IUD present.

## 2019-12-10 NOTE — ANESTHESIA PREPROCEDURE EVALUATION
12/10/2019  Delaney Arechiga is a 29 y.o., female.    Anesthesia Evaluation    I have reviewed the Patient Summary Reports.    I have reviewed the Nursing Notes.   I have reviewed the Medications.     Review of Systems  Anesthesia Hx:  Hx of Anesthetic complications PONV Denies Family Hx of Anesthesia complications.   Denies Personal Hx of Anesthesia complications.   Social:  Non-Smoker    Hematology/Oncology:  Hematology Normal   Oncology Normal     Cardiovascular:  Cardiovascular Normal     Pulmonary:  Pulmonary Normal    Renal/:   renal calculi    Hepatic/GI:  Hepatic/GI Normal    Neurological:   Headaches    Endocrine:  Endocrine Normal        Physical Exam  General:  Well nourished    Airway/Jaw/Neck:  Airway Findings: Mouth Opening: Normal Tongue: Normal  General Airway Assessment: Adult  Mallampati: II  TM Distance: Normal, at least 6 cm         Dental:  Dental Findings: In tact             Anesthesia Plan  Type of Anesthesia, risks & benefits discussed:  Anesthesia Type:  general  Patient's Preference:   Intra-op Monitoring Plan: standard ASA monitors  Intra-op Monitoring Plan Comments:   Post Op Pain Control Plan: per primary service following discharge from PACU  Post Op Pain Control Plan Comments:   Induction:   IV  Beta Blocker:         Informed Consent: Patient understands risks and agrees with Anesthesia plan.  Questions answered. Anesthesia consent signed with patient.  ASA Score: 2  emergent   Day of Surgery Review of History & Physical:    H&P update referred to the surgeon.         Ready For Surgery From Anesthesia Perspective.

## 2019-12-10 NOTE — SUBJECTIVE & OBJECTIVE
Past Medical History:   Diagnosis Date    Abnormal Pap smear of cervix 2012    Family history of breast cancer     Heart murmur 3/25/2019    Migraines     PONV (postoperative nausea and vomiting)     S/P diagnostic laparoscopy, excision of endometriosis 5/20/19 5/20/2019    Urticaria        Past Surgical History:   Procedure Laterality Date    BUNIONECTOMY Right     COLPOSCOPY      DIAGNOSTIC LAPAROSCOPY N/A 5/20/2019    Procedure: LAPAROSCOPY, DIAGNOSTIC;  Surgeon: Jeet Thompson MD;  Location: [facility] OR;  Service: OB/GYN;  Laterality: N/A;  Fulgeration endometriosis    SURGICAL REMOVAL OF ENDOMETRIOMA  5/20/2019    Procedure: EXCISION, ENDOMETRIOMA;  Surgeon: Jeet Tohmpson MD;  Location: [facility] OR;  Service: OB/GYN;;    TONSILLECTOMY         Review of patient's allergies indicates:   Allergen Reactions    Clindamycin hcl Anaphylaxis, Hives, Itching, Swelling, Dermatitis and Rash    Opioids - morphine analogues Shortness Of Breath, Itching, Nausea And Vomiting, Anxiety and Palpitations     Patient states she can take Norco    Codeine Other (See Comments)     Itching    Codeine-guaifenesin Other (See Comments)     unspecified    Penicillins Hives and Swelling    Soy      hives    Sulfa (sulfonamide antibiotics) Other (See Comments)       No current facility-administered medications on file prior to encounter.      Current Outpatient Medications on File Prior to Encounter   Medication Sig    buPROPion (WELLBUTRIN XL) 150 MG TB24 tablet Take 1 tablet (150 mg total) by mouth once daily.    levonorgestrel (MIRENA) 20 mcg/24 hr (5 years) IUD 1 each by Intrauterine route once.    loratadine (CLARITIN) 10 mg tablet Take 10 mg by mouth once daily.    meloxicam (MOBIC) 15 MG tablet Take 1 tablet (15 mg total) by mouth once daily.    norethindrone (AYGESTIN) 5 mg Tab Take 1 tablet (5 mg total) by mouth once daily.    spironolactone (ALDACTONE) 100 MG tablet Take 1 tablet (100 mg total) by mouth every  evening. Start with 1 po qday, increase to 2 po qday as tolerated     Family History     Problem Relation (Age of Onset)    Aortic aneurysm Father    Breast cancer Maternal Grandmother (80), Maternal Aunt (35), Maternal Aunt (40)    Heart failure Maternal Grandfather    Hypertension Mother    Migraines Mother, Father, Sister    Ovarian cancer Paternal Grandmother    Peripheral vascular disease Father    Stroke Mother (67)        Tobacco Use    Smoking status: Former Smoker     Types: Cigarettes     Last attempt to quit: 2018     Years since quittin.9    Smokeless tobacco: Never Used   Substance and Sexual Activity    Alcohol use: Not Currently     Frequency: Never     Drinks per session: Patient refused     Binge frequency: Never    Drug use: Never    Sexual activity: Not Currently     Partners: Male     Birth control/protection: IUD     Comment: Mirena placed 2017     Review of Systems   Constitutional: Negative for chills and fever.   HENT: Negative for sinus pain and trouble swallowing.    Respiratory: Negative for cough, shortness of breath and wheezing.    Cardiovascular: Negative for chest pain and leg swelling.   Gastrointestinal: Positive for abdominal pain, nausea and vomiting. Negative for blood in stool, constipation and diarrhea.   Genitourinary: Positive for dysuria, flank pain and frequency. Negative for hematuria.   Musculoskeletal: Negative for gait problem.   Skin: Negative for rash.   Neurological: Negative for numbness and headaches.   Psychiatric/Behavioral: Negative for confusion.     Objective:     Vital Signs (Most Recent):  Temp: 99.8 °F (37.7 °C) (12/10/19 1546)  Pulse: 101 (12/10/19 1632)  Resp: 18 (12/10/19 1022)  BP: 102/66 (12/10/19 1632)  SpO2: 99 % (12/10/19 1632) Vital Signs (24h Range):  Temp:  [97.8 °F (36.6 °C)-99.8 °F (37.7 °C)] 99.8 °F (37.7 °C)  Pulse:  [] 101  Resp:  [18] 18  SpO2:  [97 %-100 %] 99 %  BP: (102-127)/(59-92) 102/66     Weight: 56.7 kg  (125 lb)  Body mass index is 22.14 kg/m².    Physical Exam   Constitutional: She is oriented to person, place, and time. No distress.   Mild distress   HENT:   Head: Normocephalic and atraumatic.   Eyes: Pupils are equal, round, and reactive to light. EOM are normal.   Neck: Normal range of motion. Neck supple.   Cardiovascular: Normal rate and regular rhythm.   Pulmonary/Chest: Effort normal and breath sounds normal. No respiratory distress.   Abdominal: Soft. Bowel sounds are normal. She exhibits no distension.   Right cva tenderness, mild right lower abdomen tenderness   Musculoskeletal: Normal range of motion. She exhibits no edema.   Neurological: She is alert and oriented to person, place, and time. No cranial nerve deficit.   Skin: Skin is warm.   Psychiatric: She has a normal mood and affect.   Vitals reviewed.        CRANIAL NERVES     CN III, IV, VI   Pupils are equal, round, and reactive to light.  Extraocular motions are normal.        Significant Labs:   CBC:   Recent Labs   Lab 12/10/19  1133   WBC 11.61   HGB 14.2   HCT 42.9        CMP:   Recent Labs   Lab 12/10/19  1133      K 4.0      CO2 21*   *   BUN 17   CREATININE 0.9   CALCIUM 9.7   PROT 7.7   ALBUMIN 4.5   BILITOT 1.2*   ALKPHOS 45*   AST 14   ALT 10   ANIONGAP 11   EGFRNONAA >60       Significant Imaging:   CT Renal Stone Study ABD Pelvis WO  Narrative: EXAMINATION:  CT RENAL STONE STUDY ABD PELVIS WO    CLINICAL HISTORY:  Flank pain, stone disease suspected;Hematuria;    TECHNIQUE:  Low dose axial images, sagittal and coronal reformations were obtained from the lung bases to the pubic symphysis.  Contrast was not administered.    COMPARISON:  None    FINDINGS:  Images of the lower thorax are remarkable for minimal dependent atelectasis.    The liver is enlarged, correlation with LFTs recommended.  The spleen is mildly prominent.  The pancreas, gallbladder and adrenal glands have a grossly unremarkable noncontrast  appearance.  There is no biliary dilation or ascites.  No significant abdominal lymphadenopathy.    The left kidney has a grossly unremarkable noncontrast appearance without hydronephrosis or nephrolithiasis.  The left ureter is grossly unremarkable along its visualized course, no definite left ureteral calculi seen.  There is extensive right perinephric and periureteral inflammation.  There is moderate right hydroureteronephrosis, secondary to a 0.3 cm calculus within the proximal right ureter.  No additional right ureteral calculi seen.  The urinary bladder is unremarkable.  There is an IUD.  The adnexa is unremarkable.  There is trace fluid in the pelvis.    There are a few scattered colonic diverticula without inflammation.  The terminal ileum is unremarkable.  The appendix is not confidently identified, no pericecal inflammation.  The small bowel is grossly unremarkable.  Scattered shotty periaortic and paracaval lymph nodes are noted.  No focal organized pelvic fluid collection.    Degenerative changes are noted of the spine.  Impression: This report was flagged in Epic as abnormal.    1. Extensive right perinephric and periureteral inflammation as well as moderate right hydroureteronephrosis secondary to a 0.3 cm calculus within the proximal left ureter.  Correlation with urinalysis is recommended to exclude superimposed infection given the degree of inflammatory changes.  2. Mild hepatosplenomegaly, correlation with LFTs recommended.  3. Additional findings above.    Electronically signed by: Vern Bhardwaj MD  Date:    12/10/2019  Time:    14:31

## 2019-12-10 NOTE — ED NOTES
Dr. Perla at bedside for consent for stent placement. Witnessed by RN, Ze. MD kept consents with her

## 2019-12-10 NOTE — ED NOTES
NPO this after noon, admits drinking a few sips of water approx 2 hours ago. No solids today.     Report to MEME Bird

## 2019-12-10 NOTE — ED PROVIDER NOTES
Encounter Date: 12/10/2019       History     Chief Complaint   Patient presents with    Flank Pain     pt report UTI s/s that resolved 2 days ago and right flank pain started today.     Dysuria     Patient is a 29-year-old female with endometriosis and migraine presents to the emergency department via EMS with flank/back pain and dysuria.  Patient reports dysuria and urinary frequency for the past 2-3 days.  She reports onset of right-sided flank pain which began last night.  She states the pain waxes and wanes.  She states the pain became unbearable today.  She states that radiates into the right side of her abdomen.  She has never experienced similar pain before.  She reports subjective fever and chills. She also reports nausea and emesis which began last night.  She reports 4 total episodes of emesis.    The history is provided by the patient.     Review of patient's allergies indicates:   Allergen Reactions    Clindamycin hcl Anaphylaxis, Hives, Itching, Swelling, Dermatitis and Rash    Opioids - morphine analogues Shortness Of Breath, Itching, Nausea And Vomiting, Anxiety and Palpitations     Patient states she can take Norco    Codeine Other (See Comments)     Itching    Codeine-guaifenesin Other (See Comments)     unspecified    Penicillins Hives and Swelling    Soy      hives    Sulfa (sulfonamide antibiotics) Other (See Comments)     Past Medical History:   Diagnosis Date    Abnormal Pap smear of cervix 2012    Family history of breast cancer     Heart murmur 3/25/2019    Migraines     PONV (postoperative nausea and vomiting)     S/P diagnostic laparoscopy, excision of endometriosis 5/20/19 5/20/2019    Urticaria      Past Surgical History:   Procedure Laterality Date    BUNIONECTOMY Right     COLPOSCOPY      DIAGNOSTIC LAPAROSCOPY N/A 5/20/2019    Procedure: LAPAROSCOPY, DIAGNOSTIC;  Surgeon: Jeet Thompson MD;  Location: [facility];  Service: OB/GYN;  Laterality: N/A;  Fulgeration  endometriosis    SURGICAL REMOVAL OF ENDOMETRIOMA  2019    Procedure: EXCISION, ENDOMETRIOMA;  Surgeon: Jeet Thompson MD;  Location: [facility] OR;  Service: OB/GYN;;    TONSILLECTOMY       Family History   Problem Relation Age of Onset    Migraines Mother     Stroke Mother 67    Hypertension Mother     Migraines Father     Aortic aneurysm Father     Peripheral vascular disease Father         s/p leg stent    Migraines Sister     Breast cancer Maternal Grandmother 80    Heart failure Maternal Grandfather     Ovarian cancer Paternal Grandmother     Breast cancer Maternal Aunt 35    Breast cancer Maternal Aunt 40    Colon cancer Neg Hx     Diabetes Neg Hx     Melanoma Neg Hx      Social History     Tobacco Use    Smoking status: Former Smoker     Types: Cigarettes     Last attempt to quit: 2018     Years since quittin.9    Smokeless tobacco: Never Used   Substance Use Topics    Alcohol use: Not Currently     Frequency: Never     Drinks per session: Patient refused     Binge frequency: Never    Drug use: Never     Review of Systems   Constitutional: Positive for chills and fever.   HENT: Negative for congestion and sore throat.    Eyes: Negative for redness.   Respiratory: Negative for shortness of breath.    Cardiovascular: Negative for chest pain.   Gastrointestinal: Positive for abdominal pain, nausea and vomiting. Negative for diarrhea.   Genitourinary: Positive for dysuria, flank pain and frequency.   Musculoskeletal: Positive for back pain. Negative for arthralgias.   Skin: Negative for rash.   Allergic/Immunologic: Negative for immunocompromised state.   Neurological: Negative for headaches.       Physical Exam     Initial Vitals [12/10/19 1022]   BP Pulse Resp Temp SpO2   (!) 127/92 88 18 97.8 °F (36.6 °C) 100 %      MAP       --         Physical Exam    Constitutional: Vital signs are normal. She is cooperative. She appears distressed (Appears uncomfortable, shaking from pain).    HENT:   Head: Normocephalic and atraumatic.   Mouth/Throat: Mucous membranes are dry.   Eyes: Conjunctivae and EOM are normal. Pupils are equal, round, and reactive to light.   Neck: Normal range of motion. Neck supple.   Cardiovascular: Normal rate, regular rhythm and intact distal pulses.   Pulmonary/Chest: Breath sounds normal. She has no wheezes. She has no rhonchi. She has no rales.   Abdominal: Soft. Bowel sounds are normal. There is tenderness (Generalized). There is CVA tenderness (Right-sided).   Musculoskeletal: Normal range of motion. She exhibits no edema.   Neurological: She is alert and oriented to person, place, and time. GCS eye subscore is 4. GCS verbal subscore is 5. GCS motor subscore is 6.   Skin: Skin is warm and dry. No rash noted.         ED Course   Procedures  Labs Reviewed   CBC W/ AUTO DIFFERENTIAL - Abnormal; Notable for the following components:       Result Value    RDW 10.9 (*)     Gran # (ANC) 10.6 (*)     Lymph # 0.6 (*)     Mono # 0.1 (*)     Gran% 91.6 (*)     Lymph% 4.7 (*)     Mono% 1.0 (*)     All other components within normal limits   COMPREHENSIVE METABOLIC PANEL - Abnormal; Notable for the following components:    CO2 21 (*)     Glucose 134 (*)     Total Bilirubin 1.2 (*)     Alkaline Phosphatase 45 (*)     All other components within normal limits   URINALYSIS, REFLEX TO URINE CULTURE - Abnormal; Notable for the following components:    Appearance, UA Cloudy (*)     Protein, UA 2+ (*)     Ketones, UA 3+ (*)     Occult Blood UA 3+ (*)     Nitrite, UA Positive (*)     Leukocytes, UA 2+ (*)     All other components within normal limits    Narrative:     Preferred Collection Type->Urine, Clean Catch   URINALYSIS MICROSCOPIC - Abnormal; Notable for the following components:    RBC, UA >100 (*)     WBC, UA >100 (*)     Bacteria Many (*)     All other components within normal limits    Narrative:     Preferred Collection Type->Urine, Clean Catch   CULTURE, URINE   CULTURE, BLOOD    CULTURE, BLOOD   LIPASE   POCT URINE PREGNANCY          Imaging Results           CT Renal Stone Study ABD Pelvis WO (Final result)  Result time 12/10/19 14:31:32    Final result by Vern Bhardwaj MD (12/10/19 14:31:32)                 Impression:      This report was flagged in Epic as abnormal.    1. Extensive right perinephric and periureteral inflammation as well as moderate right hydroureteronephrosis secondary to a 0.3 cm calculus within the proximal left ureter.  Correlation with urinalysis is recommended to exclude superimposed infection given the degree of inflammatory changes.  2. Mild hepatosplenomegaly, correlation with LFTs recommended.  3. Additional findings above.      Electronically signed by: Vern Bhardwaj MD  Date:    12/10/2019  Time:    14:31             Narrative:    EXAMINATION:  CT RENAL STONE STUDY ABD PELVIS WO    CLINICAL HISTORY:  Flank pain, stone disease suspected;Hematuria;    TECHNIQUE:  Low dose axial images, sagittal and coronal reformations were obtained from the lung bases to the pubic symphysis.  Contrast was not administered.    COMPARISON:  None    FINDINGS:  Images of the lower thorax are remarkable for minimal dependent atelectasis.    The liver is enlarged, correlation with LFTs recommended.  The spleen is mildly prominent.  The pancreas, gallbladder and adrenal glands have a grossly unremarkable noncontrast appearance.  There is no biliary dilation or ascites.  No significant abdominal lymphadenopathy.    The left kidney has a grossly unremarkable noncontrast appearance without hydronephrosis or nephrolithiasis.  The left ureter is grossly unremarkable along its visualized course, no definite left ureteral calculi seen.  There is extensive right perinephric and periureteral inflammation.  There is moderate right hydroureteronephrosis, secondary to a 0.3 cm calculus within the proximal right ureter.  No additional right ureteral calculi seen.  The urinary bladder is  unremarkable.  There is an IUD.  The adnexa is unremarkable.  There is trace fluid in the pelvis.    There are a few scattered colonic diverticula without inflammation.  The terminal ileum is unremarkable.  The appendix is not confidently identified, no pericecal inflammation.  The small bowel is grossly unremarkable.  Scattered shotty periaortic and paracaval lymph nodes are noted.  No focal organized pelvic fluid collection.    Degenerative changes are noted of the spine.                                 Medical Decision Making:   Initial Assessment:   Urgent evaluation of a 29 y.o. female presenting to the emergency department complaining of flank pain and dysuria. Patient is afebrile, nontoxic appearing and hemodynamically stable.  VS are normal but patients appears very uncomfortable. I do have high suspicion for kidney stone given patient's history and presentation.  Will obtain blood work and provide patient with analgesic.  ED Management:  No leukocytosis.  Chemistry is significant for mild hyperglycemia and elevated total bilirubin.  Urinalysis consistent with UTI with greater than 100 WBCs and many bacteria.  CT renal stone study significant for extensive right lorena nephric and lorena ureteral inflammation as well as right hydro ureter nephrosis secondary to a 0.3 cm calculus within the proximal left ureter.  There is also note of hepatosplenomegaly.  Patient will be admitted for ureteral obstruction secondary to ureteral stone with associated pyelonephritis.  Dr. English assumed care, spoke with Urology admitted patient to hospitalist service.  Other:   I have discussed this case with another health care provider.                                 Clinical Impression:     1. Obstruction of right ureter    2. Ureterolithiasis    3. Pyelonephritis                            Jean-Paul Hung PA-C  12/10/19 1647       Jean-Paul Hung PA-C  12/10/19 9995

## 2019-12-10 NOTE — ED NOTES
Security notified to collect pt belongings/valuables prior to surgery.     Valuables envelope #[id number]

## 2019-12-10 NOTE — ED TRIAGE NOTES
Pt reports dysuria x 2 days, reports flank pain w/ emesis that started last night. Pt was carol back to room after laying on floor in ED lobby. Pt reports 10 out of 10 lower back pain. Pt is AAO x 3, answers questions appropriately

## 2019-12-11 PROBLEM — A41.9 SEPSIS: Status: ACTIVE | Noted: 2019-12-11

## 2019-12-11 PROBLEM — E83.39 HYPOPHOSPHATEMIA: Status: ACTIVE | Noted: 2019-12-11

## 2019-12-11 PROBLEM — E83.42 HYPOMAGNESEMIA: Status: ACTIVE | Noted: 2019-12-11

## 2019-12-11 PROBLEM — R78.81 BACTEREMIA: Status: ACTIVE | Noted: 2019-12-11

## 2019-12-11 LAB
ANION GAP SERPL CALC-SCNC: 6 MMOL/L (ref 8–16)
ANION GAP SERPL CALC-SCNC: 6 MMOL/L (ref 8–16)
BASOPHILS # BLD AUTO: ABNORMAL K/UL (ref 0–0.2)
BASOPHILS # BLD AUTO: ABNORMAL K/UL (ref 0–0.2)
BASOPHILS NFR BLD: 0 % (ref 0–1.9)
BASOPHILS NFR BLD: 0 % (ref 0–1.9)
BUN SERPL-MCNC: 13 MG/DL (ref 6–20)
BUN SERPL-MCNC: 13 MG/DL (ref 6–20)
CALCIUM SERPL-MCNC: 8.3 MG/DL (ref 8.7–10.5)
CALCIUM SERPL-MCNC: 8.3 MG/DL (ref 8.7–10.5)
CHLORIDE SERPL-SCNC: 110 MMOL/L (ref 95–110)
CHLORIDE SERPL-SCNC: 110 MMOL/L (ref 95–110)
CO2 SERPL-SCNC: 23 MMOL/L (ref 23–29)
CO2 SERPL-SCNC: 23 MMOL/L (ref 23–29)
CREAT SERPL-MCNC: 0.8 MG/DL (ref 0.5–1.4)
CREAT SERPL-MCNC: 0.8 MG/DL (ref 0.5–1.4)
DIFFERENTIAL METHOD: ABNORMAL
DIFFERENTIAL METHOD: ABNORMAL
EOSINOPHIL # BLD AUTO: ABNORMAL K/UL (ref 0–0.5)
EOSINOPHIL # BLD AUTO: ABNORMAL K/UL (ref 0–0.5)
EOSINOPHIL NFR BLD: 0 % (ref 0–8)
EOSINOPHIL NFR BLD: 0 % (ref 0–8)
ERYTHROCYTE [DISTWIDTH] IN BLOOD BY AUTOMATED COUNT: 11.2 % (ref 11.5–14.5)
ERYTHROCYTE [DISTWIDTH] IN BLOOD BY AUTOMATED COUNT: 11.2 % (ref 11.5–14.5)
EST. GFR  (AFRICAN AMERICAN): >60 ML/MIN/1.73 M^2
EST. GFR  (AFRICAN AMERICAN): >60 ML/MIN/1.73 M^2
EST. GFR  (NON AFRICAN AMERICAN): >60 ML/MIN/1.73 M^2
EST. GFR  (NON AFRICAN AMERICAN): >60 ML/MIN/1.73 M^2
GLUCOSE SERPL-MCNC: 155 MG/DL (ref 70–110)
GLUCOSE SERPL-MCNC: 155 MG/DL (ref 70–110)
HCT VFR BLD AUTO: 32.8 % (ref 37–48.5)
HCT VFR BLD AUTO: 32.8 % (ref 37–48.5)
HGB BLD-MCNC: 11.2 G/DL (ref 12–16)
HGB BLD-MCNC: 11.2 G/DL (ref 12–16)
IMM GRANULOCYTES # BLD AUTO: ABNORMAL K/UL (ref 0–0.04)
IMM GRANULOCYTES # BLD AUTO: ABNORMAL K/UL (ref 0–0.04)
IMM GRANULOCYTES NFR BLD AUTO: ABNORMAL % (ref 0–0.5)
IMM GRANULOCYTES NFR BLD AUTO: ABNORMAL % (ref 0–0.5)
LYMPHOCYTES # BLD AUTO: ABNORMAL K/UL (ref 1–4.8)
LYMPHOCYTES # BLD AUTO: ABNORMAL K/UL (ref 1–4.8)
LYMPHOCYTES NFR BLD: 2 % (ref 18–48)
LYMPHOCYTES NFR BLD: 2 % (ref 18–48)
MAGNESIUM SERPL-MCNC: 1.4 MG/DL (ref 1.6–2.6)
MCH RBC QN AUTO: 30.9 PG (ref 27–31)
MCH RBC QN AUTO: 30.9 PG (ref 27–31)
MCHC RBC AUTO-ENTMCNC: 34.1 G/DL (ref 32–36)
MCHC RBC AUTO-ENTMCNC: 34.1 G/DL (ref 32–36)
MCV RBC AUTO: 90 FL (ref 82–98)
MCV RBC AUTO: 90 FL (ref 82–98)
METAMYELOCYTES NFR BLD MANUAL: 1 %
METAMYELOCYTES NFR BLD MANUAL: 1 %
MONOCYTES # BLD AUTO: ABNORMAL K/UL (ref 0.3–1)
MONOCYTES # BLD AUTO: ABNORMAL K/UL (ref 0.3–1)
MONOCYTES NFR BLD: 0 % (ref 4–15)
MONOCYTES NFR BLD: 0 % (ref 4–15)
NEUTROPHILS NFR BLD: 95 % (ref 38–73)
NEUTROPHILS NFR BLD: 95 % (ref 38–73)
NEUTS BAND NFR BLD MANUAL: 2 %
NEUTS BAND NFR BLD MANUAL: 2 %
NRBC BLD-RTO: 0 /100 WBC
NRBC BLD-RTO: 0 /100 WBC
PHOSPHATE SERPL-MCNC: 1.7 MG/DL (ref 2.7–4.5)
PLATELET # BLD AUTO: 194 K/UL (ref 150–350)
PLATELET # BLD AUTO: 194 K/UL (ref 150–350)
PLATELET BLD QL SMEAR: ABNORMAL
PLATELET BLD QL SMEAR: ABNORMAL
PMV BLD AUTO: 10.7 FL (ref 9.2–12.9)
PMV BLD AUTO: 10.7 FL (ref 9.2–12.9)
POTASSIUM SERPL-SCNC: 4.2 MMOL/L (ref 3.5–5.1)
POTASSIUM SERPL-SCNC: 4.2 MMOL/L (ref 3.5–5.1)
RBC # BLD AUTO: 3.63 M/UL (ref 4–5.4)
RBC # BLD AUTO: 3.63 M/UL (ref 4–5.4)
SODIUM SERPL-SCNC: 139 MMOL/L (ref 136–145)
SODIUM SERPL-SCNC: 139 MMOL/L (ref 136–145)
WBC # BLD AUTO: 19.86 K/UL (ref 3.9–12.7)
WBC # BLD AUTO: 19.86 K/UL (ref 3.9–12.7)

## 2019-12-11 PROCEDURE — 84100 ASSAY OF PHOSPHORUS: CPT

## 2019-12-11 PROCEDURE — 63600175 PHARM REV CODE 636 W HCPCS: Performed by: INTERNAL MEDICINE

## 2019-12-11 PROCEDURE — 99232 SBSQ HOSP IP/OBS MODERATE 35: CPT | Mod: ,,, | Performed by: UROLOGY

## 2019-12-11 PROCEDURE — 63600175 PHARM REV CODE 636 W HCPCS: Performed by: UROLOGY

## 2019-12-11 PROCEDURE — 85007 BL SMEAR W/DIFF WBC COUNT: CPT

## 2019-12-11 PROCEDURE — 11000001 HC ACUTE MED/SURG PRIVATE ROOM

## 2019-12-11 PROCEDURE — 80048 BASIC METABOLIC PNL TOTAL CA: CPT

## 2019-12-11 PROCEDURE — 94761 N-INVAS EAR/PLS OXIMETRY MLT: CPT

## 2019-12-11 PROCEDURE — 25000003 PHARM REV CODE 250: Performed by: PHYSICIAN ASSISTANT

## 2019-12-11 PROCEDURE — 85027 COMPLETE CBC AUTOMATED: CPT

## 2019-12-11 PROCEDURE — 99233 SBSQ HOSP IP/OBS HIGH 50: CPT | Mod: ,,, | Performed by: INTERNAL MEDICINE

## 2019-12-11 PROCEDURE — 99233 PR SUBSEQUENT HOSPITAL CARE,LEVL III: ICD-10-PCS | Mod: ,,, | Performed by: INTERNAL MEDICINE

## 2019-12-11 PROCEDURE — 25000003 PHARM REV CODE 250: Performed by: INTERNAL MEDICINE

## 2019-12-11 PROCEDURE — 83735 ASSAY OF MAGNESIUM: CPT

## 2019-12-11 PROCEDURE — 36415 COLL VENOUS BLD VENIPUNCTURE: CPT

## 2019-12-11 PROCEDURE — 99232 PR SUBSEQUENT HOSPITAL CARE,LEVL II: ICD-10-PCS | Mod: ,,, | Performed by: UROLOGY

## 2019-12-11 RX ORDER — SIMETHICONE 80 MG
1 TABLET,CHEWABLE ORAL 3 TIMES DAILY PRN
Status: DISCONTINUED | OUTPATIENT
Start: 2019-12-11 | End: 2019-12-13 | Stop reason: HOSPADM

## 2019-12-11 RX ORDER — LANOLIN ALCOHOL/MO/W.PET/CERES
400 CREAM (GRAM) TOPICAL 2 TIMES DAILY
Status: DISCONTINUED | OUTPATIENT
Start: 2019-12-11 | End: 2019-12-13 | Stop reason: HOSPADM

## 2019-12-11 RX ORDER — PHENAZOPYRIDINE HYDROCHLORIDE 100 MG/1
100 TABLET, FILM COATED ORAL 3 TIMES DAILY PRN
Status: DISCONTINUED | OUTPATIENT
Start: 2019-12-11 | End: 2019-12-13 | Stop reason: HOSPADM

## 2019-12-11 RX ORDER — SODIUM,POTASSIUM PHOSPHATES 280-250MG
1 POWDER IN PACKET (EA) ORAL
Status: DISCONTINUED | OUTPATIENT
Start: 2019-12-11 | End: 2019-12-13 | Stop reason: HOSPADM

## 2019-12-11 RX ORDER — OXYBUTYNIN CHLORIDE 5 MG/1
5 TABLET ORAL 3 TIMES DAILY PRN
Status: DISCONTINUED | OUTPATIENT
Start: 2019-12-11 | End: 2019-12-13 | Stop reason: HOSPADM

## 2019-12-11 RX ORDER — NORETHINDRONE 5 MG/1
5 TABLET ORAL NIGHTLY
Status: DISCONTINUED | OUTPATIENT
Start: 2019-12-11 | End: 2019-12-13 | Stop reason: HOSPADM

## 2019-12-11 RX ADMIN — CIPROFLOXACIN 400 MG: 2 INJECTION, SOLUTION INTRAVENOUS at 08:12

## 2019-12-11 RX ADMIN — TAMSULOSIN HYDROCHLORIDE 0.4 MG: 0.4 CAPSULE ORAL at 08:12

## 2019-12-11 RX ADMIN — MAGNESIUM GLUCONATE 500 MG ORAL TABLET 400 MG: 500 TABLET ORAL at 09:12

## 2019-12-11 RX ADMIN — KETOROLAC TROMETHAMINE 15 MG: 30 INJECTION, SOLUTION INTRAMUSCULAR; INTRAVENOUS at 10:12

## 2019-12-11 RX ADMIN — KETOROLAC TROMETHAMINE 15 MG: 30 INJECTION, SOLUTION INTRAMUSCULAR; INTRAVENOUS at 04:12

## 2019-12-11 RX ADMIN — MAGNESIUM GLUCONATE 500 MG ORAL TABLET 400 MG: 500 TABLET ORAL at 08:12

## 2019-12-11 RX ADMIN — CIPROFLOXACIN 400 MG: 2 INJECTION, SOLUTION INTRAVENOUS at 09:12

## 2019-12-11 RX ADMIN — NORETHINDRONE 5 MG: 5 TABLET ORAL at 09:12

## 2019-12-11 RX ADMIN — POTASSIUM & SODIUM PHOSPHATES POWDER PACK 280-160-250 MG 1 PACKET: 280-160-250 PACK at 12:12

## 2019-12-11 RX ADMIN — POTASSIUM & SODIUM PHOSPHATES POWDER PACK 280-160-250 MG 1 PACKET: 280-160-250 PACK at 04:12

## 2019-12-11 RX ADMIN — BUPROPION HYDROCHLORIDE 150 MG: 150 TABLET, FILM COATED, EXTENDED RELEASE ORAL at 08:12

## 2019-12-11 RX ADMIN — SODIUM CHLORIDE: 0.9 INJECTION, SOLUTION INTRAVENOUS at 04:12

## 2019-12-11 RX ADMIN — OXYBUTYNIN CHLORIDE 5 MG: 5 TABLET ORAL at 03:12

## 2019-12-11 RX ADMIN — PHENAZOPYRIDINE 100 MG: 100 TABLET ORAL at 03:12

## 2019-12-11 RX ADMIN — POTASSIUM & SODIUM PHOSPHATES POWDER PACK 280-160-250 MG 1 PACKET: 280-160-250 PACK at 09:12

## 2019-12-11 RX ADMIN — SIMETHICONE CHEW TAB 80 MG 80 MG: 80 TABLET ORAL at 09:12

## 2019-12-11 NOTE — PROGRESS NOTES
[facility]  Urology  Progress Note    Patient Name: Delaney Arechiga  MRN: [id number]  Admission Date: 12/10/2019  Hospital Length of Stay: 1 days  Code Status: Full Code   Attending Provider: Abeba West MD   Primary Care Physician: Ale Quinn MD    Subjective:     HPI:  This is a 29 YOF presents to ER via EMS with R flank pain, R abdominal pain and dysuria. She had dysuria a few days ago but took OTC medications that gave her relief. She began to have R flank pain last night but woke up this AM with R flank and abdominal pain that was severe. She reports the dysuria started again as well. She denies gross hematuria. She has felt chills but has not checked her temperature at home. She has not been able to tolerate PO intake. She reports vomiting multiple times since this morning, most recently on the floor of the ED lobby.     CT scan done in ED shows a 3-4mm stone in the proximal Right ureter with associated hydronephrosis.     Upon evaluation, she is afebrile. She appears uncomfortable. She is tachycardic at 107 and her blood pressure monitor currently reads 90s/60s.   No leukocytosis, Cr at baseline.     UA shows many bacteria, >100W, >100R, 1 squam, nitrite positive.     She was given a dose of Cipro in the ED     She denies prior history of kidney stones.     Interval History:  S/p R ureteral stent placement yesterday evening   Looking much better this morning, sitting up eating breakfast  Pain improved  No fevers/chills overnight   Low hgb likely dilutional as patient reports seeing a little blood in urine initially but now is clear     Objective:     Temp:  [97.8 °F (36.6 °C)-99.8 °F (37.7 °C)] 98.3 °F (36.8 °C)  Pulse:  [] 82  Resp:  [15-18] 18  SpO2:  [94 %-100 %] 98 %  BP: ()/(51-92) 96/62     Body mass index is 22.14 kg/m².           Drains     None                 Physical Exam   Constitutional: She is oriented to person, place, and time. She appears  well-developed and well-nourished. No distress.   HENT:   Head: Normocephalic and atraumatic.   Eyes: No scleral icterus.   Neck: No tracheal deviation present.   Pulmonary/Chest: Effort normal. No respiratory distress.   Abdominal: Soft. She exhibits no distension. There is no tenderness.   Musculoskeletal: Normal range of motion.   Neurological: She is alert and oriented to person, place, and time.   Skin: Skin is warm and dry.     Psychiatric: She has a normal mood and affect. Her behavior is normal.       Significant Labs:    BMP:  Recent Labs   Lab 12/10/19  1133 12/11/19  0417    139  139   K 4.0 4.2  4.2    110  110   CO2 21* 23  23   BUN 17 13  13   CREATININE 0.9 0.8  0.8   CALCIUM 9.7 8.3*  8.3*       CBC:  Recent Labs   Lab 12/10/19  1133 12/11/19  0417   WBC 11.61 19.86*  19.86*   HGB 14.2 11.2*  11.2*   HCT 42.9 32.8*  32.8*    194  194       All pertinent labs results from the past 24 hours have been reviewed.    Significant Imaging:  CT: I have reviewed all results within the past 24 hours and my personal findings are:  as below   I have reviewed the CT scan from 12/10/19   Adrenal glands: unremarkable bilaterally.   Left kidney is free of stones or masses, no hydronephrosis apparent. Left ureter has a normal course and caliber. There are no left ureteral stones present.   Right kidney is free of stones or masses, hydronephrosis associated with proximal right ureteral stone measuring 3-4mm.   Bladder is unremarkable.   Uterus with IUD present.                         Assessment/Plan:     * Ureterolithiasis  S/p R ureteral stent placement 12/10, doing very well  Will need oxybutynin PRN bladder spasms, flomax daily while stent is in place, and pyridium PRN for dysuria          VTE Risk Mitigation (From admission, onward)         Ordered     IP VTE LOW RISK PATIENT  Once      12/10/19 2024     Place LEONOR hose  Until discontinued      12/10/19 2024     Place sequential  compression device  Until discontinued      12/10/19 2024                Cielo Perla MD  Urology  [facility]

## 2019-12-11 NOTE — PLAN OF CARE
Initial Discharge Planning Assessment:  Patient admitted on 12/10/2019    Chart reviewed, Care plan discussed with treatment team,  attending Dr West    PCP updated in Epic: Dr. Quinn  Pharmacy, updated in [facility]: [facility]    DME at home: none    Current dispo: Home  Transportation: Friend to drive home    Power of  or Living Will: none    Case management  to follow.  No anticipated DC needs from CM perspective.       12/11/19 1204   Discharge Assessment   Assessment Type Discharge Planning Assessment   Confirmed/corrected address and phone number on facesheet? Yes   Assessment information obtained from? Patient   Communicated expected length of stay with patient/caregiver yes   Prior to hospitilization cognitive status: Alert/Oriented   Prior to hospitalization functional status: Independent   Current cognitive status: Alert/Oriented   Current Functional Status: Independent   Lives With friend(s)   Able to Return to Prior Arrangements yes   Is patient able to care for self after discharge? Yes   Who are your caregiver(s) and their phone number(s)? Roommate: Tyson Membreno [phone]   Patient's perception of discharge disposition home or selfcare   Readmission Within the Last 30 Days no previous admission in last 30 days   Patient currently being followed by outpatient case management? No   Patient currently receives any other outside agency services? No   Equipment Currently Used at Home none   Do you have any problems affording any of your prescribed medications? No   Is the patient taking medications as prescribed? yes   Does the patient have transportation home? Yes   Transportation Anticipated family or friend will provide   Does the patient receive services at the [facility]? No   Discharge Plan A Home   DME Needed Upon Discharge  none   Patient/Family in Agreement with Plan yes

## 2019-12-11 NOTE — HPI
29 year old female with past medical history of endometriosis, came in with right flank pain for 1-2 days getting worse.Pain radiates from flank area to lower right abdomen.Associated with subjective fever, chills, nausea and vomiting.Patient had dysuria symptoms few days ago for which she took over the counter meds with some relief but return of symptoms.C/o dark colored urine.Denies h/o kidney stones in past.Ct scan showedExtensive right perinephric and periureteral inflammation as well as moderate right hydroureteronephrosis secondary to a 0.3 cm calculus within the proximal left ureter.  Correlation with urinalysis is recommended to exclude superimposed infection given the degree of inflammatory changes.

## 2019-12-11 NOTE — OP NOTE
[facility] Note    Date: 12/10/2019    Pre-Op Diagnosis: Right ureteral stone    Op Diagnosis: same    Procedure(s) Performed:    1. Cystoscopy with right ureteral JJ stent placement  2. Fluoroscopy < 1 h    Specimen(s): none    Staff Surgeon: Ramu England MD    Assistant Surgeon: Cielo Perla MD    Anesthesia: Monitored Local Anesthesia with Sedation    Indications: Delaney Arechiga is a 29 y.o. female with right ureteral stone in the setting of a urinary tract infection.    Findings:    Upon insertion of wire passed the obstructing stone, there was extrusion of purulent urine from the right UO    Estimated Blood Loss: min    Drains:  6 [place]an x 26 cm right JJ ureteral stent without strings    Procedure in Detail:  After risks, benefits and possible complications of the procedure were explained, the patient elected to undergo the procedure and informed consent was obtained. All questions were answered in the lorena-operative area. The patient was transferred to the cystoscopy suite and placed on the fluoroscopy table in the supine position.  SCDs were applied and working. Time out was performed, lorena-procedural antibiotics were given. Anesthesia was administered.  After adequate anesthesia the patient was placed in dorsal lithotomy position and prepped and draped in the usual sterile fashion.     A rigid cystoscope in a 22 Fr sheath was introduced into the patients bladder per urethra. This passed easily.  The entire urethra was visualized and revealed no strictures or masses.  Cystoscopy was performed which showed the right and left ureteral orifices in the normal anatomic position.  There were no bladder tumors, no  trabeculations, and no stones.      Our attention was turned to the patient's right ureteral orifice.  A motion wire was advanced up the right ureteral orifice to the level of the expected renal pelvis.  This was confirmed using fluoroscopy. Upon insertion of the wire into  the proximal ureter we began to see purulent urine extruding from the right UO.     We then passed a 6 Fr x 22 cm JJ ureteral stent without strings over the wire to the level of the renal pelvis under direct vision as well as flouroscopy. The stent would not advance further over the wire when it reached the proximal ureter.  The decision was made to try a longer stent and so the stent was exchanged for a 6 x 24 cm stent. This stent, too, was too short and we did not see a good coil in the renal pelvis. It was exchanged for a 6 x 26 cm stent, this time with appropriate curls confirmed with fluoroscopy and cystoscopy. By this time, the urine had cleared.     The patient tolerated the procedure well and was transferred to the recovery room in stable condition.      Disposition: The patient will be admitted overnight to the hospital medicine service.     Cielo Perla MD    Attestation:  I have reviewed the notes, assessments, and/or procedures performed by Cielo Perla MD, I concur with her/his documentation of Delaney Arechiga.    I was present for the entire procedure.

## 2019-12-11 NOTE — NURSING
0914: Received call for critical value from microbiology at [facility]. Result: 2 aerobic and 2 anaerobic gram negative rods from 12/10/19 collection of blood cultures.     0930: Dr. West notified. Critical value flowsheet completed. No new orders at this time.

## 2019-12-11 NOTE — ASSESSMENT & PLAN NOTE
S/p R ureteral stent placement 12/10, doing very well  OK to DC from urology standpoint   Will need oxybutynin PRN bladder spasms, flomax daily while stent is in place, and pyridium PRN for dysuria

## 2019-12-11 NOTE — NURSING
Pt arrived via hospital bed accompanied by nurse. Pt oriented to room. Pt AAOx4. Bed locked and in lowest position. Call light w/i reach. Pt instructed on how to use call bell.

## 2019-12-11 NOTE — SUBJECTIVE & OBJECTIVE
Interval History:  S/p R ureteral stent placement yesterday evening   Looking much better this morning, sitting up eating breakfast  Pain improved  No fevers/chills overnight   Low hgb likely dilutional as patient reports seeing a little blood in urine initially but now is clear     Objective:     Temp:  [97.8 °F (36.6 °C)-99.8 °F (37.7 °C)] 98.3 °F (36.8 °C)  Pulse:  [] 82  Resp:  [15-18] 18  SpO2:  [94 %-100 %] 98 %  BP: ()/(51-92) 96/62     Body mass index is 22.14 kg/m².           Drains     None                 Physical Exam   Constitutional: She is oriented to person, place, and time. She appears well-developed and well-nourished. No distress.   HENT:   Head: Normocephalic and atraumatic.   Eyes: No scleral icterus.   Neck: No tracheal deviation present.   Pulmonary/Chest: Effort normal. No respiratory distress.   Abdominal: Soft. She exhibits no distension. There is no tenderness.   Musculoskeletal: Normal range of motion.   Neurological: She is alert and oriented to person, place, and time.   Skin: Skin is warm and dry.     Psychiatric: She has a normal mood and affect. Her behavior is normal.       Significant Labs:    BMP:  Recent Labs   Lab 12/10/19  1133 12/11/19  0417    139  139   K 4.0 4.2  4.2    110  110   CO2 21* 23  23   BUN 17 13  13   CREATININE 0.9 0.8  0.8   CALCIUM 9.7 8.3*  8.3*       CBC:  Recent Labs   Lab 12/10/19  1133 12/11/19  0417   WBC 11.61 19.86*  19.86*   HGB 14.2 11.2*  11.2*   HCT 42.9 32.8*  32.8*    194  194       All pertinent labs results from the past 24 hours have been reviewed.    Significant Imaging:  CT: I have reviewed all results within the past 24 hours and my personal findings are:  as below   I have reviewed the CT scan from 12/10/19   Adrenal glands: unremarkable bilaterally.   Left kidney is free of stones or masses, no hydronephrosis apparent. Left ureter has a normal course and caliber. There are no left ureteral stones  present.   Right kidney is free of stones or masses, hydronephrosis associated with proximal right ureteral stone measuring 3-4mm.   Bladder is unremarkable.   Uterus with IUD present.

## 2019-12-11 NOTE — ASSESSMENT & PLAN NOTE
Patient with obstructive ureteral stone on right.  Will continue on ivf, prn ketorolac for pain.  Started on flomax, strain urine   Urology consulted, s/p stent  Prn oxybutynin, pyridium

## 2019-12-11 NOTE — H&P
[facility] Hospital Medicine  History & Physical    Patient Name: Delaney Arechiga  MRN: [id number]  Admission Date: 12/10/2019  Attending Physician: Abeba West MD   Primary Care Provider: Ale Quinn MD         Patient information was obtained from patient and ER records.     Subjective:     Principal Problem:Ureterolithiasis    Chief Complaint:   Chief Complaint   Patient presents with    Flank Pain     pt report UTI s/s that resolved 2 days ago and right flank pain started today.     Dysuria        HPI: 29 year old female with past medical history of endometriosis, came in with right flank pain for 1-2 days getting worse.Pain radiates from flank area to lower right abdomen.Associated with subjective fever, chills, nausea and vomiting.Patient had dysuria symptoms few days ago for which she took over the counter meds with some relief but return of symptoms.C/o dark colored urine.Denies h/o kidney stones in past.Ct scan showedExtensive right perinephric and periureteral inflammation as well as moderate right hydroureteronephrosis secondary to a 0.3 cm calculus within the proximal left ureter.  Correlation with urinalysis is recommended to exclude superimposed infection given the degree of inflammatory changes.    Past Medical History:   Diagnosis Date    Abnormal Pap smear of cervix 2012    Family history of breast cancer     Heart murmur 3/25/2019    Migraines     PONV (postoperative nausea and vomiting)     S/P diagnostic laparoscopy, excision of endometriosis 5/20/19 5/20/2019    Urticaria        Past Surgical History:   Procedure Laterality Date    BUNIONECTOMY Right     COLPOSCOPY      DIAGNOSTIC LAPAROSCOPY N/A 5/20/2019    Procedure: LAPAROSCOPY, DIAGNOSTIC;  Surgeon: Jeet Thompson MD;  Location: [facility];  Service: OB/GYN;  Laterality: N/A;  Fulgeration endometriosis    SURGICAL REMOVAL OF ENDOMETRIOMA  5/20/2019    Procedure: EXCISION, ENDOMETRIOMA;  Surgeon: Jeet  HENRIETTA Thompson MD;  Location: [facility] OR;  Service: OB/GYN;;    TONSILLECTOMY         Review of patient's allergies indicates:   Allergen Reactions    Clindamycin hcl Anaphylaxis, Hives, Itching, Swelling, Dermatitis and Rash    Opioids - morphine analogues Shortness Of Breath, Itching, Nausea And Vomiting, Anxiety and Palpitations     Patient states she can take Norco    Codeine Other (See Comments)     Itching    Codeine-guaifenesin Other (See Comments)     unspecified    Penicillins Hives and Swelling    Soy      hives    Sulfa (sulfonamide antibiotics) Other (See Comments)       No current facility-administered medications on file prior to encounter.      Current Outpatient Medications on File Prior to Encounter   Medication Sig    buPROPion (WELLBUTRIN XL) 150 MG TB24 tablet Take 1 tablet (150 mg total) by mouth once daily.    levonorgestrel (MIRENA) 20 mcg/24 hr (5 years) IUD 1 each by Intrauterine route once.    loratadine (CLARITIN) 10 mg tablet Take 10 mg by mouth once daily.    meloxicam (MOBIC) 15 MG tablet Take 1 tablet (15 mg total) by mouth once daily.    norethindrone (AYGESTIN) 5 mg Tab Take 1 tablet (5 mg total) by mouth once daily.    spironolactone (ALDACTONE) 100 MG tablet Take 1 tablet (100 mg total) by mouth every evening. Start with 1 po qday, increase to 2 po qday as tolerated     Family History     Problem Relation (Age of Onset)    Aortic aneurysm Father    Breast cancer Maternal Grandmother (80), Maternal Aunt (35), Maternal Aunt (40)    Heart failure Maternal Grandfather    Hypertension Mother    Migraines Mother, Father, Sister    Ovarian cancer Paternal Grandmother    Peripheral vascular disease Father    Stroke Mother (67)        Tobacco Use    Smoking status: Former Smoker     Types: Cigarettes     Last attempt to quit: 2018     Years since quittin.9    Smokeless tobacco: Never Used   Substance and Sexual Activity    Alcohol use: Not Currently     Frequency: Never      Drinks per session: Patient refused     Binge frequency: Never    Drug use: Never    Sexual activity: Not Currently     Partners: Male     Birth control/protection: IUD     Comment: Mirena placed January 2017     Review of Systems   Constitutional: Negative for chills and fever.   HENT: Negative for sinus pain and trouble swallowing.    Respiratory: Negative for cough, shortness of breath and wheezing.    Cardiovascular: Negative for chest pain and leg swelling.   Gastrointestinal: Positive for abdominal pain, nausea and vomiting. Negative for blood in stool, constipation and diarrhea.   Genitourinary: Positive for dysuria, flank pain and frequency. Negative for hematuria.   Musculoskeletal: Negative for gait problem.   Skin: Negative for rash.   Neurological: Negative for numbness and headaches.   Psychiatric/Behavioral: Negative for confusion.     Objective:     Vital Signs (Most Recent):  Temp: 99.8 °F (37.7 °C) (12/10/19 1546)  Pulse: 101 (12/10/19 1632)  Resp: 18 (12/10/19 1022)  BP: 102/66 (12/10/19 1632)  SpO2: 99 % (12/10/19 1632) Vital Signs (24h Range):  Temp:  [97.8 °F (36.6 °C)-99.8 °F (37.7 °C)] 99.8 °F (37.7 °C)  Pulse:  [] 101  Resp:  [18] 18  SpO2:  [97 %-100 %] 99 %  BP: (102-127)/(59-92) 102/66     Weight: 56.7 kg (125 lb)  Body mass index is 22.14 kg/m².    Physical Exam   Constitutional: She is oriented to person, place, and time. No distress.   Mild distress   HENT:   Head: Normocephalic and atraumatic.   Eyes: Pupils are equal, round, and reactive to light. EOM are normal.   Neck: Normal range of motion. Neck supple.   Cardiovascular: Normal rate and regular rhythm.   Pulmonary/Chest: Effort normal and breath sounds normal. No respiratory distress.   Abdominal: Soft. Bowel sounds are normal. She exhibits no distension.   Right cva tenderness, mild right lower abdomen tenderness   Musculoskeletal: Normal range of motion. She exhibits no edema.   Neurological: She is alert and oriented  to person, place, and time. No cranial nerve deficit.   Skin: Skin is warm.   Psychiatric: She has a normal mood and affect.   Vitals reviewed.        CRANIAL NERVES     CN III, IV, VI   Pupils are equal, round, and reactive to light.  Extraocular motions are normal.        Significant Labs:   CBC:   Recent Labs   Lab 12/10/19  1133   WBC 11.61   HGB 14.2   HCT 42.9        CMP:   Recent Labs   Lab 12/10/19  1133      K 4.0      CO2 21*   *   BUN 17   CREATININE 0.9   CALCIUM 9.7   PROT 7.7   ALBUMIN 4.5   BILITOT 1.2*   ALKPHOS 45*   AST 14   ALT 10   ANIONGAP 11   EGFRNONAA >60       Significant Imaging:   CT Renal Stone Study ABD Pelvis WO  Narrative: EXAMINATION:  CT RENAL STONE STUDY ABD PELVIS WO    CLINICAL HISTORY:  Flank pain, stone disease suspected;Hematuria;    TECHNIQUE:  Low dose axial images, sagittal and coronal reformations were obtained from the lung bases to the pubic symphysis.  Contrast was not administered.    COMPARISON:  None    FINDINGS:  Images of the lower thorax are remarkable for minimal dependent atelectasis.    The liver is enlarged, correlation with LFTs recommended.  The spleen is mildly prominent.  The pancreas, gallbladder and adrenal glands have a grossly unremarkable noncontrast appearance.  There is no biliary dilation or ascites.  No significant abdominal lymphadenopathy.    The left kidney has a grossly unremarkable noncontrast appearance without hydronephrosis or nephrolithiasis.  The left ureter is grossly unremarkable along its visualized course, no definite left ureteral calculi seen.  There is extensive right perinephric and periureteral inflammation.  There is moderate right hydroureteronephrosis, secondary to a 0.3 cm calculus within the proximal right ureter.  No additional right ureteral calculi seen.  The urinary bladder is unremarkable.  There is an IUD.  The adnexa is unremarkable.  There is trace fluid in the pelvis.    There are a few  scattered colonic diverticula without inflammation.  The terminal ileum is unremarkable.  The appendix is not confidently identified, no pericecal inflammation.  The small bowel is grossly unremarkable.  Scattered shotty periaortic and paracaval lymph nodes are noted.  No focal organized pelvic fluid collection.    Degenerative changes are noted of the spine.  Impression: This report was flagged in Epic as abnormal.    1. Extensive right perinephric and periureteral inflammation as well as moderate right hydroureteronephrosis secondary to a 0.3 cm calculus within the proximal left ureter.  Correlation with urinalysis is recommended to exclude superimposed infection given the degree of inflammatory changes.  2. Mild hepatosplenomegaly, correlation with LFTs recommended.  3. Additional findings above.    Electronically signed by: Vern Bhardwaj MD  Date:    12/10/2019  Time:    14:31        Assessment/Plan:     * Ureterolithiasis    Patient with obstructive ureteral stone on right.  Will continue on ivf, prn ketorolac for pain.  Start on flomax, strain urine   Urology consulted, possible cystoscopy in am, will keep patient npo after midnight.      Pyelonephritis  Secondary to obstructing ureteral stone  Follow urine cx  Empirically started on cipro.      Endometriosis    Will continue home hormonal pills.      VTE Risk Mitigation (From admission, onward)         Ordered     IP VTE LOW RISK PATIENT  Once      12/10/19 2024     Place LEONOR hose  Until discontinued      12/10/19 2024     Place sequential compression device  Until discontinued      12/10/19 2024                   Abeba West MD  Department of Hospital Medicine   [facility]

## 2019-12-11 NOTE — ASSESSMENT & PLAN NOTE
Gram negative bacteremia, secondary to obstructed ureteral stone  Continue iv cipro, follow cx  Repeat blood cx in am

## 2019-12-11 NOTE — PLAN OF CARE
No significant events overnight. Remains free from fall, injury, skin breakdown. VSS on RA throughout the night. Positions self-independently. All urine strained, no stones found. Pt denies pain. All alarms active and auduble. TEDs/SCDs in place. Plan of care reviewed w/ pt and all questions answered. Bed locked and in lowest position. Call light w/I reach. No needs at this time. Purposeful hourly rounding. Will continue to monitor.

## 2019-12-11 NOTE — PROGRESS NOTES
[facility] Hospital Medicine  Progress Note    Patient Name: Delaney Arechiga  MRN: [id number]  Patient Class: IP- Inpatient   Admission Date: 12/10/2019  Length of Stay: 1 days  Attending Physician: Abeba West MD  Primary Care Provider: Ale Quinn MD        Subjective:     Principal Problem:Ureterolithiasis        HPI:  29 year old female with past medical history of endometriosis, came in with right flank pain for 1-2 days getting worse.Pain radiates from flank area to lower right abdomen.Associated with subjective fever, chills, nausea and vomiting.Patient had dysuria symptoms few days ago for which she took over the counter meds with some relief but return of symptoms.C/o dark colored urine.Denies h/o kidney stones in past.Ct scan showedExtensive right perinephric and periureteral inflammation as well as moderate right hydroureteronephrosis secondary to a 0.3 cm calculus within the proximal left ureter.  Correlation with urinalysis is recommended to exclude superimposed infection given the degree of inflammatory changes.    Overview/Hospital Course:  Patient was seen by urology and had right ureteral stent placement.Her blood cx came back positive for gram negative rods.    Interval History: right flank pain improved from yesterday but c/o pain with urination, bleeding in urine improved from last night.    Review of Systems   Constitutional: Negative for chills and fever.   HENT: Negative for sinus pain and trouble swallowing.    Respiratory: Negative for cough and shortness of breath.    Cardiovascular: Negative for chest pain and leg swelling.   Gastrointestinal: Negative for abdominal pain, blood in stool, constipation, diarrhea, nausea and vomiting.   Genitourinary: Positive for flank pain and hematuria. Negative for dysuria.   Musculoskeletal: Negative for gait problem.   Skin: Negative for rash.   Neurological: Negative for headaches.   Psychiatric/Behavioral: Negative for  confusion.     Objective:     Vital Signs (Most Recent):  Temp: 98.4 °F (36.9 °C) (12/11/19 1137)  Pulse: 95 (12/11/19 1137)  Resp: 17 (12/11/19 1137)  BP: 99/62 (12/11/19 1137)  SpO2: 97 % (12/11/19 1137) Vital Signs (24h Range):  Temp:  [97.8 °F (36.6 °C)-99.8 °F (37.7 °C)] 98.4 °F (36.9 °C)  Pulse:  [] 95  Resp:  [15-18] 17  SpO2:  [94 %-99 %] 97 %  BP: ()/(51-78) 99/62     Weight: 56.7 kg (125 lb)  Body mass index is 22.14 kg/m².    Intake/Output Summary (Last 24 hours) at 12/11/2019 1457  Last data filed at 12/11/2019 1027  Gross per 24 hour   Intake 2050 ml   Output 2875 ml   Net -825 ml      Physical Exam   Constitutional: She is oriented to person, place, and time. No distress.   HENT:   Head: Normocephalic and atraumatic.   Eyes: Pupils are equal, round, and reactive to light. EOM are normal.   Neck: Normal range of motion. Neck supple.   Cardiovascular: Normal rate and regular rhythm.   Pulmonary/Chest: Effort normal and breath sounds normal. No respiratory distress.   Abdominal: Soft. Bowel sounds are normal. She exhibits no distension.   Right cva tenderness   Musculoskeletal: Normal range of motion. She exhibits no edema.   Neurological: She is alert and oriented to person, place, and time. No cranial nerve deficit.   Skin: Skin is warm.   Psychiatric: She has a normal mood and affect.   Vitals reviewed.      Significant Labs:   BMP:   Recent Labs   Lab 12/11/19 0417   *  155*     139   K 4.2  4.2     110   CO2 23  23   BUN 13  13   CREATININE 0.8  0.8   CALCIUM 8.3*  8.3*   MG 1.4*     CBC:   Recent Labs   Lab 12/10/19  1133 12/11/19 0417   WBC 11.61 19.86*  19.86*   HGB 14.2 11.2*  11.2*   HCT 42.9 32.8*  32.8*    194  194       Significant Imaging: I have reviewed all pertinent imaging results/findings within the past 24 hours.      Assessment/Plan:      * Ureterolithiasis    Patient with obstructive ureteral stone on right.  Will continue on ivf,  prn ketorolac for pain.  Started on flomax, strain urine   Urology consulted, s/p stent  Prn oxybutynin, pyridium      Bacteremia  Gram negative bacteremia, secondary to obstructed ureteral stone  Continue iv cipro, follow cx  Repeat blood cx in am      Hypophosphatemia  Replace oral and monitor      Hypomagnesemia  Replace oral and monitor      Sepsis  Secondary to above with leukocytosis, tachycardia,bacteremia  Follow cx , continue abx, ivf      Pyelonephritis  Secondary to obstructing ureteral stone  Follow urine cx  Empirically started on cipro.      Obstruction of right ureter  As above      Endometriosis    Will continue home hormonal pills  Pharmacy is getting from other [facility] so patient can get her night dose today.  Does not have her own meds and not in formulary here.      VTE Risk Mitigation (From admission, onward)         Ordered     IP VTE LOW RISK PATIENT  Once      12/10/19 2024     Place LEONOR hose  Until discontinued      12/10/19 2024     Place sequential compression device  Until discontinued      12/10/19 2024                      Abeba West MD  Department of Hospital Medicine   [facility]

## 2019-12-11 NOTE — ASSESSMENT & PLAN NOTE
Will continue home hormonal pills  Pharmacy is getting from other [facility] so patient can get her night dose today.  Does not have her own meds and not in formulary here.

## 2019-12-11 NOTE — ASSESSMENT & PLAN NOTE
Patient with obstructive ureteral stone on right.  Will continue on ivf, prn ketorolac for pain.  Start on flomax, strain urine   Urology consulted, possible cystoscopy in am, will keep patient npo after midnight.

## 2019-12-11 NOTE — SUBJECTIVE & OBJECTIVE
Interval History: right flank pain improved from yesterday but c/o pain with urination, bleeding in urine improved from last night.    Review of Systems   Constitutional: Negative for chills and fever.   HENT: Negative for sinus pain and trouble swallowing.    Respiratory: Negative for cough and shortness of breath.    Cardiovascular: Negative for chest pain and leg swelling.   Gastrointestinal: Negative for abdominal pain, blood in stool, constipation, diarrhea, nausea and vomiting.   Genitourinary: Positive for flank pain and hematuria. Negative for dysuria.   Musculoskeletal: Negative for gait problem.   Skin: Negative for rash.   Neurological: Negative for headaches.   Psychiatric/Behavioral: Negative for confusion.     Objective:     Vital Signs (Most Recent):  Temp: 98.4 °F (36.9 °C) (12/11/19 1137)  Pulse: 95 (12/11/19 1137)  Resp: 17 (12/11/19 1137)  BP: 99/62 (12/11/19 1137)  SpO2: 97 % (12/11/19 1137) Vital Signs (24h Range):  Temp:  [97.8 °F (36.6 °C)-99.8 °F (37.7 °C)] 98.4 °F (36.9 °C)  Pulse:  [] 95  Resp:  [15-18] 17  SpO2:  [94 %-99 %] 97 %  BP: ()/(51-78) 99/62     Weight: 56.7 kg (125 lb)  Body mass index is 22.14 kg/m².    Intake/Output Summary (Last 24 hours) at 12/11/2019 1457  Last data filed at 12/11/2019 1027  Gross per 24 hour   Intake 2050 ml   Output 2875 ml   Net -825 ml      Physical Exam   Constitutional: She is oriented to person, place, and time. No distress.   HENT:   Head: Normocephalic and atraumatic.   Eyes: Pupils are equal, round, and reactive to light. EOM are normal.   Neck: Normal range of motion. Neck supple.   Cardiovascular: Normal rate and regular rhythm.   Pulmonary/Chest: Effort normal and breath sounds normal. No respiratory distress.   Abdominal: Soft. Bowel sounds are normal. She exhibits no distension.   Right cva tenderness   Musculoskeletal: Normal range of motion. She exhibits no edema.   Neurological: She is alert and oriented to person, place, and  time. No cranial nerve deficit.   Skin: Skin is warm.   Psychiatric: She has a normal mood and affect.   Vitals reviewed.      Significant Labs:   BMP:   Recent Labs   Lab 12/11/19 0417   *  155*     139   K 4.2  4.2     110   CO2 23  23   BUN 13  13   CREATININE 0.8  0.8   CALCIUM 8.3*  8.3*   MG 1.4*     CBC:   Recent Labs   Lab 12/10/19  1133 12/11/19 0417   WBC 11.61 19.86*  19.86*   HGB 14.2 11.2*  11.2*   HCT 42.9 32.8*  32.8*    194  194       Significant Imaging: I have reviewed all pertinent imaging results/findings within the past 24 hours.

## 2019-12-11 NOTE — ANESTHESIA POSTPROCEDURE EVALUATION
Anesthesia Post Evaluation    Patient: Delaney Arechiga    Procedure(s) Performed: Procedure(s) (LRB):  CYSTOSCOPY, WITH URETERAL STENT INSERTION (Right)    Final Anesthesia Type: general    Patient location during evaluation: PACU  Patient participation: Yes- Able to Participate  Level of consciousness: awake and alert  Post-procedure vital signs: reviewed and stable  Pain management: adequate  Airway patency: patent    PONV status at discharge: No PONV  Anesthetic complications: no      Cardiovascular status: blood pressure returned to baseline  Respiratory status: unassisted  Hydration status: euvolemic  Follow-up not needed.          Vitals Value Taken Time   BP 96/51 12/10/2019  7:40 PM   Temp 36.6 °C (97.8 °F) 12/10/2019  6:36 PM   Pulse 104 12/10/2019  7:45 PM   Resp 18 12/10/2019  7:40 PM   SpO2 94 % 12/10/2019  7:45 PM   Vitals shown include unvalidated device data.      No case tracking events are documented in the log.      Pain/Gilda Score: Pain Rating Prior to Med Admin: 0 (12/10/2019  6:38 PM)  Pain Rating Post Med Admin: 0 (12/10/2019  7:10 PM)  Gilda Score: 10 (12/10/2019  7:25 PM)

## 2019-12-11 NOTE — ANESTHESIA PROCEDURE NOTES
Intubation  Performed by: Ty Gomez CRNA  Authorized by: Ty Gomez CRNA     Intubation:     Induction:  Intravenous    Intubated:  Postinduction    Mask Ventilation:  Easy mask    Attempts:  1    Attempted By:  CRNA    Method of Intubation:  Direct    Blade:  Glidescope 3    Laryngeal View Grade: Grade I - full view of chords      Difficult Airway Encountered?: No      Complications:  None    Airway Device:  Oral endotracheal tube    Airway Device Size:  7.0    Style/Cuff Inflation:  Cuffed    Inflation Amount (mL):  7    Tube secured:  21    Secured at:  The lips    Placement Verified By:  Capnometry    Complicating Factors:  None    Findings Post-Intubation:  BS equal bilateral

## 2019-12-11 NOTE — TRANSFER OF CARE
"Anesthesia Transfer of Care Note    Patient: Delaney Arechiga    Procedure(s) Performed: Procedure(s) (LRB):  CYSTOSCOPY, WITH URETERAL STENT INSERTION (Right)    Patient location: PACU    Anesthesia Type: general    Transport from OR: Transported from OR on 2-3 L/min O2 by NC with adequate spontaneous ventilation    Post pain: adequate analgesia    Post assessment: no apparent anesthetic complications    Post vital signs: stable    Level of consciousness: awake, alert and oriented    Nausea/Vomiting: no nausea/vomiting    Complications: none    Transfer of care protocol was followed      Last vitals:   Visit Vitals  /66   Pulse 98   Temp 37.7 °C (99.8 °F) (Oral)   Resp 18   Ht 5' 3" (1.6 m)   Wt 56.7 kg (125 lb)   SpO2 98%   BMI 22.14 kg/m²     "

## 2019-12-11 NOTE — HOSPITAL COURSE
Patient was seen by urology and had right ureteral stent placement.Her blood and urine cx came back positive for gram negative rods, pan sensitive e coli.Repeat blood cx were negative.Wbc improved, and she was symptomatically better.Was discharged on oral abx and urology follow up.

## 2019-12-11 NOTE — PLAN OF CARE
Pt AAOx4, VSS on RA and afebrile. Pt remains free from fall, injury, and skin breakdown. Pain controlled via IV pain medication (see MAR), tolerating well. Tolerating ordered diet without nausea. Ambulating independently without difficulty. Voiding spontaneously without difficulty, all urine strained per order with no stones noted. Urine jessica colored and pink tinged. Pt refusing teds/ scds - pt educated on importance. POC reviewed with patient and all questions answered. Pt has call light in reach, bed brakes on, side rails up x2, bed in low position, and nonskid socks on. Purposeful hourly rounding and safety maintained. Continue to monitor.

## 2019-12-12 PROBLEM — E87.6 HYPOKALEMIA: Status: ACTIVE | Noted: 2019-12-12

## 2019-12-12 LAB
ANION GAP SERPL CALC-SCNC: 6 MMOL/L (ref 8–16)
BACTERIA UR CULT: ABNORMAL
BASOPHILS # BLD AUTO: 0.01 K/UL (ref 0–0.2)
BASOPHILS NFR BLD: 0.1 % (ref 0–1.9)
BUN SERPL-MCNC: 10 MG/DL (ref 6–20)
CALCIUM SERPL-MCNC: 7.5 MG/DL (ref 8.7–10.5)
CHLORIDE SERPL-SCNC: 114 MMOL/L (ref 95–110)
CO2 SERPL-SCNC: 22 MMOL/L (ref 23–29)
CREAT SERPL-MCNC: 0.6 MG/DL (ref 0.5–1.4)
DIFFERENTIAL METHOD: ABNORMAL
EOSINOPHIL # BLD AUTO: 0.1 K/UL (ref 0–0.5)
EOSINOPHIL NFR BLD: 0.7 % (ref 0–8)
ERYTHROCYTE [DISTWIDTH] IN BLOOD BY AUTOMATED COUNT: 11.5 % (ref 11.5–14.5)
EST. GFR  (AFRICAN AMERICAN): >60 ML/MIN/1.73 M^2
EST. GFR  (NON AFRICAN AMERICAN): >60 ML/MIN/1.73 M^2
GLUCOSE SERPL-MCNC: 95 MG/DL (ref 70–110)
HCT VFR BLD AUTO: 28.7 % (ref 37–48.5)
HGB BLD-MCNC: 9.6 G/DL (ref 12–16)
IMM GRANULOCYTES # BLD AUTO: 0.07 K/UL (ref 0–0.04)
IMM GRANULOCYTES NFR BLD AUTO: 0.7 % (ref 0–0.5)
LYMPHOCYTES # BLD AUTO: 1.7 K/UL (ref 1–4.8)
LYMPHOCYTES NFR BLD: 16.5 % (ref 18–48)
MAGNESIUM SERPL-MCNC: 1.8 MG/DL (ref 1.6–2.6)
MCH RBC QN AUTO: 30.7 PG (ref 27–31)
MCHC RBC AUTO-ENTMCNC: 33.4 G/DL (ref 32–36)
MCV RBC AUTO: 92 FL (ref 82–98)
MONOCYTES # BLD AUTO: 0.7 K/UL (ref 0.3–1)
MONOCYTES NFR BLD: 6.3 % (ref 4–15)
NEUTROPHILS # BLD AUTO: 7.9 K/UL (ref 1.8–7.7)
NEUTROPHILS NFR BLD: 75.7 % (ref 38–73)
NRBC BLD-RTO: 0 /100 WBC
PHOSPHATE SERPL-MCNC: 1.9 MG/DL (ref 2.7–4.5)
PLATELET # BLD AUTO: 155 K/UL (ref 150–350)
PMV BLD AUTO: 10.9 FL (ref 9.2–12.9)
POTASSIUM SERPL-SCNC: 3.2 MMOL/L (ref 3.5–5.1)
RBC # BLD AUTO: 3.13 M/UL (ref 4–5.4)
SODIUM SERPL-SCNC: 142 MMOL/L (ref 136–145)
WBC # BLD AUTO: 10.4 K/UL (ref 3.9–12.7)

## 2019-12-12 PROCEDURE — 99233 SBSQ HOSP IP/OBS HIGH 50: CPT | Mod: ,,, | Performed by: INTERNAL MEDICINE

## 2019-12-12 PROCEDURE — 99900035 HC TECH TIME PER 15 MIN (STAT)

## 2019-12-12 PROCEDURE — 83735 ASSAY OF MAGNESIUM: CPT

## 2019-12-12 PROCEDURE — 94761 N-INVAS EAR/PLS OXIMETRY MLT: CPT

## 2019-12-12 PROCEDURE — 80048 BASIC METABOLIC PNL TOTAL CA: CPT

## 2019-12-12 PROCEDURE — 25000003 PHARM REV CODE 250: Performed by: PHYSICIAN ASSISTANT

## 2019-12-12 PROCEDURE — 11000001 HC ACUTE MED/SURG PRIVATE ROOM

## 2019-12-12 PROCEDURE — 63600175 PHARM REV CODE 636 W HCPCS: Performed by: INTERNAL MEDICINE

## 2019-12-12 PROCEDURE — 85025 COMPLETE CBC W/AUTO DIFF WBC: CPT

## 2019-12-12 PROCEDURE — 36415 COLL VENOUS BLD VENIPUNCTURE: CPT

## 2019-12-12 PROCEDURE — 87040 BLOOD CULTURE FOR BACTERIA: CPT

## 2019-12-12 PROCEDURE — 84100 ASSAY OF PHOSPHORUS: CPT

## 2019-12-12 PROCEDURE — 99233 PR SUBSEQUENT HOSPITAL CARE,LEVL III: ICD-10-PCS | Mod: ,,, | Performed by: INTERNAL MEDICINE

## 2019-12-12 PROCEDURE — 25000003 PHARM REV CODE 250: Performed by: INTERNAL MEDICINE

## 2019-12-12 RX ORDER — POTASSIUM CHLORIDE 20 MEQ/1
40 TABLET, EXTENDED RELEASE ORAL ONCE
Status: COMPLETED | OUTPATIENT
Start: 2019-12-12 | End: 2019-12-12

## 2019-12-12 RX ADMIN — POTASSIUM & SODIUM PHOSPHATES POWDER PACK 280-160-250 MG 1 PACKET: 280-160-250 PACK at 08:12

## 2019-12-12 RX ADMIN — SODIUM CHLORIDE: 0.9 INJECTION, SOLUTION INTRAVENOUS at 11:12

## 2019-12-12 RX ADMIN — MAGNESIUM GLUCONATE 500 MG ORAL TABLET 400 MG: 500 TABLET ORAL at 08:12

## 2019-12-12 RX ADMIN — BUPROPION HYDROCHLORIDE 150 MG: 150 TABLET, FILM COATED, EXTENDED RELEASE ORAL at 08:12

## 2019-12-12 RX ADMIN — SIMETHICONE CHEW TAB 80 MG 80 MG: 80 TABLET ORAL at 08:12

## 2019-12-12 RX ADMIN — NORETHINDRONE 5 MG: 5 TABLET ORAL at 08:12

## 2019-12-12 RX ADMIN — POTASSIUM & SODIUM PHOSPHATES POWDER PACK 280-160-250 MG 1 PACKET: 280-160-250 PACK at 04:12

## 2019-12-12 RX ADMIN — TAMSULOSIN HYDROCHLORIDE 0.4 MG: 0.4 CAPSULE ORAL at 08:12

## 2019-12-12 RX ADMIN — CIPROFLOXACIN 400 MG: 2 INJECTION, SOLUTION INTRAVENOUS at 08:12

## 2019-12-12 RX ADMIN — POTASSIUM CHLORIDE 40 MEQ: 1500 TABLET, EXTENDED RELEASE ORAL at 08:12

## 2019-12-12 RX ADMIN — POTASSIUM & SODIUM PHOSPHATES POWDER PACK 280-160-250 MG 1 PACKET: 280-160-250 PACK at 12:12

## 2019-12-12 NOTE — PLAN OF CARE
Plan of care reviewed with patient. Pt remains free from fall, injury, and skin breakdown. Patient ambulates w/o difficulty. Voiding spontaneously. Purposeful hourly rounding done. Safety maintained. Patient lying in bed in no distress. Will continue to monitor.

## 2019-12-12 NOTE — ASSESSMENT & PLAN NOTE
Gram negative bacteremia, secondary to obstructed ureteral stone  Continue iv cipro, follow full cx report  Repeat blood cx done 12/12/19  Leukocytosis improved

## 2019-12-12 NOTE — ASSESSMENT & PLAN NOTE
Patient with obstructive ureteral stone on right.  Will continue on ivf, decrease to 100 cc/hr, prn ketorolac for pain.  Started on flomax  Urology consulted, s/p stent  Prn oxybutynin, pyridium

## 2019-12-12 NOTE — PROGRESS NOTES
[facility] Hospital Medicine  Progress Note    Patient Name: Delaney Arechiga  MRN: [id number]  Patient Class: IP- Inpatient   Admission Date: 12/10/2019  Length of Stay: 2 days  Attending Physician: Abeba West MD  Primary Care Provider: Ale Quinn MD        Subjective:     Principal Problem:Ureterolithiasis        HPI:  29 year old female with past medical history of endometriosis, came in with right flank pain for 1-2 days getting worse.Pain radiates from flank area to lower right abdomen.Associated with subjective fever, chills, nausea and vomiting.Patient had dysuria symptoms few days ago for which she took over the counter meds with some relief but return of symptoms.C/o dark colored urine.Denies h/o kidney stones in past.Ct scan showedExtensive right perinephric and periureteral inflammation as well as moderate right hydroureteronephrosis secondary to a 0.3 cm calculus within the proximal left ureter.  Correlation with urinalysis is recommended to exclude superimposed infection given the degree of inflammatory changes.    Overview/Hospital Course:  Patient was seen by urology and had right ureteral stent placement.Her blood cx came back positive for gram negative rods.    Interval History:feels much better,  right flank pain improved ,  c/o pain with urination but improved, some bleeding in urine   Review of Systems   Constitutional: Negative for chills and fever.   HENT: Negative for sinus pain and trouble swallowing.    Respiratory: Negative for cough and shortness of breath.    Cardiovascular: Negative for chest pain and leg swelling.   Gastrointestinal: Negative for abdominal pain, blood in stool, constipation, diarrhea, nausea and vomiting.   Genitourinary: Positive for flank pain and hematuria. Negative for dysuria.   Musculoskeletal: Negative for gait problem.   Skin: Negative for rash.   Neurological: Negative for headaches.   Psychiatric/Behavioral: Negative for  confusion.     Objective:     Vital Signs (Most Recent):  Temp: 98.3 °F (36.8 °C) (12/12/19 1507)  Pulse: 102 (12/12/19 1507)  Resp: 18 (12/12/19 1507)  BP: 115/75 (12/12/19 1507)  SpO2: 98 % (12/12/19 1507) Vital Signs (24h Range):  Temp:  [97.7 °F (36.5 °C)-98.5 °F (36.9 °C)] 98.3 °F (36.8 °C)  Pulse:  [] 102  Resp:  [18-20] 18  SpO2:  [96 %-98 %] 98 %  BP: (103-118)/(60-76) 115/75     Weight: 56.7 kg (125 lb)  Body mass index is 22.14 kg/m².    Intake/Output Summary (Last 24 hours) at 12/12/2019 1630  Last data filed at 12/12/2019 0600  Gross per 24 hour   Intake 3290 ml   Output 1400 ml   Net 1890 ml      Physical Exam   Constitutional: She is oriented to person, place, and time. No distress.   HENT:   Head: Normocephalic and atraumatic.   Eyes: Pupils are equal, round, and reactive to light. EOM are normal.   Neck: Normal range of motion. Neck supple.   Cardiovascular: Normal rate and regular rhythm.   Pulmonary/Chest: Effort normal and breath sounds normal. No respiratory distress.   Abdominal: Soft. Bowel sounds are normal. She exhibits no distension.   Mild Right cva tenderness   Musculoskeletal: Normal range of motion. She exhibits no edema.   Neurological: She is alert and oriented to person, place, and time. No cranial nerve deficit.   Skin: Skin is warm.   Psychiatric: She has a normal mood and affect.   Vitals reviewed.      Significant Labs:   BMP:   Recent Labs   Lab 12/12/19 0419   GLU 95      K 3.2*   *   CO2 22*   BUN 10   CREATININE 0.6   CALCIUM 7.5*   MG 1.8     CBC:   Recent Labs   Lab 12/11/19 0417 12/12/19 0419   WBC 19.86*  19.86* 10.40   HGB 11.2*  11.2* 9.6*   HCT 32.8*  32.8* 28.7*     194 155       Significant Imaging: I have reviewed all pertinent imaging results/findings within the past 24 hours.      Assessment/Plan:      * Ureterolithiasis    Patient with obstructive ureteral stone on right.  Will continue on ivf, decrease to 100 cc/hr, prn ketorolac  for pain.  Started on flomax  Urology consulted, s/p stent  Prn oxybutynin, pyridium      Hypokalemia  Replace oral , monitor labs      Bacteremia  Gram negative bacteremia, secondary to obstructed ureteral stone  Continue iv cipro, follow full cx report  Repeat blood cx done 12/12/19  Leukocytosis improved      Hypophosphatemia  Replace oral and monitor      Hypomagnesemia  Replace oral and monitor  improved    Sepsis  Secondary to above with leukocytosis, tachycardia,bacteremia  Follow cx , continue abx, ivf      Pyelonephritis  Secondary to obstructing ureteral stone  Follow urine cx  Empirically started on cipro.      Obstruction of right ureter  As above      Endometriosis    Will continue home hormonal pills  Pharmacy got it  from other [facility] so patient can get her nightly dose  Does not have her own meds and not in formulary here.      VTE Risk Mitigation (From admission, onward)         Ordered     IP VTE LOW RISK PATIENT  Once      12/10/19 2024     Place LEONOR hose  Until discontinued      12/10/19 2024     Place sequential compression device  Until discontinued      12/10/19 2024                      Abeba West MD  Department of Hospital Medicine   [facility]

## 2019-12-12 NOTE — NURSING
Patient complaints of gas discomfort to abdomen. RITO Lee notified and new telephone order given for simethicone 80 mg po 3X daily PRN for flatulence. Telephone order read back.

## 2019-12-12 NOTE — SUBJECTIVE & OBJECTIVE
Interval History:  Resting comfortably in bed this am, no complaints     Objective:     Temp:  [97.7 °F (36.5 °C)-98.5 °F (36.9 °C)] 98.1 °F (36.7 °C)  Pulse:  [] 95  Resp:  [17-20] 18  SpO2:  [96 %-98 %] 98 %  BP: ()/(58-76) 118/76     Body mass index is 22.14 kg/m².           Drains     None                 Physical Exam   Constitutional: She is oriented to person, place, and time. She appears well-developed and well-nourished. No distress.   HENT:   Head: Normocephalic and atraumatic.   Eyes: No scleral icterus.   Neck: No tracheal deviation present.   Pulmonary/Chest: Effort normal. No respiratory distress.   Abdominal: Soft. She exhibits no distension. There is no tenderness.   Musculoskeletal: Normal range of motion.   Neurological: She is alert and oriented to person, place, and time.   Skin: Skin is warm and dry.     Psychiatric: She has a normal mood and affect. Her behavior is normal.       Significant Labs:    BMP:  Recent Labs   Lab 12/10/19  1133 12/11/19  0417 12/12/19 0419    139  139 142   K 4.0 4.2  4.2 3.2*    110  110 114*   CO2 21* 23  23 22*   BUN 17 13  13 10   CREATININE 0.9 0.8  0.8 0.6   CALCIUM 9.7 8.3*  8.3* 7.5*       CBC:  Recent Labs   Lab 12/10/19  1133 12/11/19  0417 12/12/19 0419   WBC 11.61 19.86*  19.86* 10.40   HGB 14.2 11.2*  11.2* 9.6*   HCT 42.9 32.8*  32.8* 28.7*    194  194 155       All pertinent labs results from the past 24 hours have been reviewed.    Significant Imaging:  CT: I have reviewed all results within the past 24 hours and my personal findings are:  as below   I have reviewed the CT scan from 12/10/19   Adrenal glands: unremarkable bilaterally.   Left kidney is free of stones or masses, no hydronephrosis apparent. Left ureter has a normal course and caliber. There are no left ureteral stones present.   Right kidney is free of stones or masses, hydronephrosis associated with proximal right ureteral stone measuring  3-4mm.   Bladder is unremarkable.   Uterus with IUD present.

## 2019-12-12 NOTE — ASSESSMENT & PLAN NOTE
S/p R ureteral stent placement 12/10, doing very well  Ucx growing e coli and blood cx growing GNR   Repeat bcx pending   Ucx s/s pending     Will need oxybutynin PRN bladder spasms, flomax daily while stent is in place, and pyridium PRN for dysuria

## 2019-12-12 NOTE — ASSESSMENT & PLAN NOTE
Will continue home hormonal pills  Pharmacy got it  from other [facility] so patient can get her nightly dose  Does not have her own meds and not in formulary here.

## 2019-12-12 NOTE — PROGRESS NOTES
[facility]  Urology  Progress Note    Patient Name: Delaney Arechiga  MRN: [id number]  Admission Date: 12/10/2019  Hospital Length of Stay: 2 days  Code Status: Full Code   Attending Provider: Abeba West MD   Primary Care Physician: Ale Quinn MD    Subjective:     HPI:  This is a 29 YOF presents to ER via EMS with R flank pain, R abdominal pain and dysuria. She had dysuria a few days ago but took OTC medications that gave her relief. She began to have R flank pain last night but woke up this AM with R flank and abdominal pain that was severe. She reports the dysuria started again as well. She denies gross hematuria. She has felt chills but has not checked her temperature at home. She has not been able to tolerate PO intake. She reports vomiting multiple times since this morning, most recently on the floor of the ED lobby.     CT scan done in ED shows a 3-4mm stone in the proximal Right ureter with associated hydronephrosis.     Upon evaluation, she is afebrile. She appears uncomfortable. She is tachycardic at 107 and her blood pressure monitor currently reads 90s/60s.   No leukocytosis, Cr at baseline.     UA shows many bacteria, >100W, >100R, 1 squam, nitrite positive.     She was given a dose of Cipro in the ED     She denies prior history of kidney stones.     Interval History:  Resting comfortably in bed this am, no complaints     Objective:     Temp:  [97.7 °F (36.5 °C)-98.5 °F (36.9 °C)] 98.1 °F (36.7 °C)  Pulse:  [] 95  Resp:  [17-20] 18  SpO2:  [96 %-98 %] 98 %  BP: ()/(58-76) 118/76     Body mass index is 22.14 kg/m².           Drains     None                 Physical Exam   Constitutional: She is oriented to person, place, and time. She appears well-developed and well-nourished. No distress.   HENT:   Head: Normocephalic and atraumatic.   Eyes: No scleral icterus.   Neck: No tracheal deviation present.   Pulmonary/Chest: Effort normal. No respiratory distress.    Abdominal: Soft. She exhibits no distension. There is no tenderness.   Musculoskeletal: Normal range of motion.   Neurological: She is alert and oriented to person, place, and time.   Skin: Skin is warm and dry.     Psychiatric: She has a normal mood and affect. Her behavior is normal.       Significant Labs:    BMP:  Recent Labs   Lab 12/10/19  1133 12/11/19 0417 12/12/19 0419    139  139 142   K 4.0 4.2  4.2 3.2*    110  110 114*   CO2 21* 23  23 22*   BUN 17 13  13 10   CREATININE 0.9 0.8  0.8 0.6   CALCIUM 9.7 8.3*  8.3* 7.5*       CBC:  Recent Labs   Lab 12/10/19  1133 12/11/19 0417 12/12/19 0419   WBC 11.61 19.86*  19.86* 10.40   HGB 14.2 11.2*  11.2* 9.6*   HCT 42.9 32.8*  32.8* 28.7*    194  194 155       All pertinent labs results from the past 24 hours have been reviewed.    Significant Imaging:  CT: I have reviewed all results within the past 24 hours and my personal findings are:  as below   I have reviewed the CT scan from 12/10/19   Adrenal glands: unremarkable bilaterally.   Left kidney is free of stones or masses, no hydronephrosis apparent. Left ureter has a normal course and caliber. There are no left ureteral stones present.   Right kidney is free of stones or masses, hydronephrosis associated with proximal right ureteral stone measuring 3-4mm.   Bladder is unremarkable.   Uterus with IUD present.         Assessment/Plan:     * Ureterolithiasis  S/p R ureteral stent placement 12/10, doing very well  Ucx growing e coli and blood cx growing GNR   Repeat bcx pending   Ucx s/s pending     Will need oxybutynin PRN bladder spasms, flomax daily while stent is in place, and pyridium PRN for dysuria          VTE Risk Mitigation (From admission, onward)         Ordered     IP VTE LOW RISK PATIENT  Once      12/10/19 2024     Place LEONOR hose  Until discontinued      12/10/19 2024     Place sequential compression device  Until discontinued      12/10/19 2024                 Cielo Perla MD  Urology  [facility]

## 2019-12-13 ENCOUNTER — TELEPHONE (OUTPATIENT)
Dept: UROLOGY | Facility: CLINIC | Age: 29
End: 2019-12-13

## 2019-12-13 VITALS
DIASTOLIC BLOOD PRESSURE: 60 MMHG | OXYGEN SATURATION: 96 % | HEART RATE: 79 BPM | WEIGHT: 125 LBS | SYSTOLIC BLOOD PRESSURE: 130 MMHG | TEMPERATURE: 99 F | RESPIRATION RATE: 18 BRPM | BODY MASS INDEX: 22.15 KG/M2 | HEIGHT: 63 IN

## 2019-12-13 LAB
ANION GAP SERPL CALC-SCNC: 5 MMOL/L (ref 8–16)
BACTERIA BLD CULT: ABNORMAL
BASOPHILS # BLD AUTO: 0.01 K/UL (ref 0–0.2)
BASOPHILS NFR BLD: 0.1 % (ref 0–1.9)
BUN SERPL-MCNC: 4 MG/DL (ref 6–20)
CALCIUM SERPL-MCNC: 8.3 MG/DL (ref 8.7–10.5)
CHLORIDE SERPL-SCNC: 112 MMOL/L (ref 95–110)
CO2 SERPL-SCNC: 24 MMOL/L (ref 23–29)
CREAT SERPL-MCNC: 0.6 MG/DL (ref 0.5–1.4)
DIFFERENTIAL METHOD: ABNORMAL
EOSINOPHIL # BLD AUTO: 0.1 K/UL (ref 0–0.5)
EOSINOPHIL NFR BLD: 1.4 % (ref 0–8)
ERYTHROCYTE [DISTWIDTH] IN BLOOD BY AUTOMATED COUNT: 11.3 % (ref 11.5–14.5)
EST. GFR  (AFRICAN AMERICAN): >60 ML/MIN/1.73 M^2
EST. GFR  (NON AFRICAN AMERICAN): >60 ML/MIN/1.73 M^2
GLUCOSE SERPL-MCNC: 87 MG/DL (ref 70–110)
HCT VFR BLD AUTO: 29.7 % (ref 37–48.5)
HGB BLD-MCNC: 9.8 G/DL (ref 12–16)
IMM GRANULOCYTES # BLD AUTO: 0.03 K/UL (ref 0–0.04)
IMM GRANULOCYTES NFR BLD AUTO: 0.4 % (ref 0–0.5)
LYMPHOCYTES # BLD AUTO: 2.6 K/UL (ref 1–4.8)
LYMPHOCYTES NFR BLD: 30.1 % (ref 18–48)
MAGNESIUM SERPL-MCNC: 1.9 MG/DL (ref 1.6–2.6)
MCH RBC QN AUTO: 30 PG (ref 27–31)
MCHC RBC AUTO-ENTMCNC: 33 G/DL (ref 32–36)
MCV RBC AUTO: 91 FL (ref 82–98)
MONOCYTES # BLD AUTO: 0.6 K/UL (ref 0.3–1)
MONOCYTES NFR BLD: 6.8 % (ref 4–15)
NEUTROPHILS # BLD AUTO: 5.2 K/UL (ref 1.8–7.7)
NEUTROPHILS NFR BLD: 61.2 % (ref 38–73)
NRBC BLD-RTO: 0 /100 WBC
PHOSPHATE SERPL-MCNC: 2.6 MG/DL (ref 2.7–4.5)
PLATELET # BLD AUTO: 166 K/UL (ref 150–350)
PMV BLD AUTO: 10.4 FL (ref 9.2–12.9)
POTASSIUM SERPL-SCNC: 3.5 MMOL/L (ref 3.5–5.1)
RBC # BLD AUTO: 3.27 M/UL (ref 4–5.4)
SODIUM SERPL-SCNC: 141 MMOL/L (ref 136–145)
WBC # BLD AUTO: 8.47 K/UL (ref 3.9–12.7)

## 2019-12-13 PROCEDURE — 83735 ASSAY OF MAGNESIUM: CPT

## 2019-12-13 PROCEDURE — 84100 ASSAY OF PHOSPHORUS: CPT

## 2019-12-13 PROCEDURE — 85025 COMPLETE CBC W/AUTO DIFF WBC: CPT

## 2019-12-13 PROCEDURE — 25000003 PHARM REV CODE 250: Performed by: PHYSICIAN ASSISTANT

## 2019-12-13 PROCEDURE — 25000003 PHARM REV CODE 250: Performed by: INTERNAL MEDICINE

## 2019-12-13 PROCEDURE — 99239 HOSP IP/OBS DSCHRG MGMT >30: CPT | Mod: ,,, | Performed by: INTERNAL MEDICINE

## 2019-12-13 PROCEDURE — 99239 PR HOSPITAL DISCHARGE DAY,>30 MIN: ICD-10-PCS | Mod: ,,, | Performed by: INTERNAL MEDICINE

## 2019-12-13 PROCEDURE — 63600175 PHARM REV CODE 636 W HCPCS: Performed by: INTERNAL MEDICINE

## 2019-12-13 PROCEDURE — 80048 BASIC METABOLIC PNL TOTAL CA: CPT

## 2019-12-13 PROCEDURE — 36415 COLL VENOUS BLD VENIPUNCTURE: CPT

## 2019-12-13 RX ORDER — CIPROFLOXACIN 500 MG/1
500 TABLET ORAL EVERY 12 HOURS
Qty: 20 TABLET | Refills: 0 | Status: SHIPPED | OUTPATIENT
Start: 2019-12-13 | End: 2019-12-23

## 2019-12-13 RX ORDER — TAMSULOSIN HYDROCHLORIDE 0.4 MG/1
0.4 CAPSULE ORAL DAILY
Qty: 30 CAPSULE | Refills: 0 | Status: SHIPPED | OUTPATIENT
Start: 2019-12-14 | End: 2020-02-19

## 2019-12-13 RX ORDER — PHENAZOPYRIDINE HYDROCHLORIDE 100 MG/1
100 TABLET, FILM COATED ORAL 3 TIMES DAILY PRN
Qty: 30 TABLET | Refills: 0 | Status: SHIPPED | OUTPATIENT
Start: 2019-12-13 | End: 2019-12-23

## 2019-12-13 RX ORDER — OXYBUTYNIN CHLORIDE 5 MG/1
5 TABLET ORAL 3 TIMES DAILY PRN
Qty: 30 TABLET | Refills: 0 | Status: SHIPPED | OUTPATIENT
Start: 2019-12-13 | End: 2019-12-23 | Stop reason: SDUPTHER

## 2019-12-13 RX ORDER — POTASSIUM CHLORIDE 20 MEQ/1
20 TABLET, EXTENDED RELEASE ORAL ONCE
Status: COMPLETED | OUTPATIENT
Start: 2019-12-13 | End: 2019-12-13

## 2019-12-13 RX ADMIN — SIMETHICONE CHEW TAB 80 MG 80 MG: 80 TABLET ORAL at 08:12

## 2019-12-13 RX ADMIN — POTASSIUM CHLORIDE 20 MEQ: 1500 TABLET, EXTENDED RELEASE ORAL at 08:12

## 2019-12-13 RX ADMIN — BUPROPION HYDROCHLORIDE 150 MG: 150 TABLET, FILM COATED, EXTENDED RELEASE ORAL at 08:12

## 2019-12-13 RX ADMIN — TAMSULOSIN HYDROCHLORIDE 0.4 MG: 0.4 CAPSULE ORAL at 08:12

## 2019-12-13 RX ADMIN — CIPROFLOXACIN 400 MG: 2 INJECTION, SOLUTION INTRAVENOUS at 08:12

## 2019-12-13 RX ADMIN — POTASSIUM & SODIUM PHOSPHATES POWDER PACK 280-160-250 MG 1 PACKET: 280-160-250 PACK at 08:12

## 2019-12-13 RX ADMIN — MAGNESIUM GLUCONATE 500 MG ORAL TABLET 400 MG: 500 TABLET ORAL at 08:12

## 2019-12-13 NOTE — ASSESSMENT & PLAN NOTE
S/p R ureteral stent placement 12/10, doing very well  Ucx and bcx growing e. Coli, pan sensitive   Ok to DC with abx  She will need f/u to discuss ureteroscopy in 1-2 weeks     Will need oxybutynin PRN bladder spasms, flomax daily while stent is in place, and pyridium PRN for dysuria

## 2019-12-13 NOTE — TELEPHONE ENCOUNTER
----- Message from Stacy Prakash MA sent at 12/13/2019  8:01 AM CST -----  MD HOOD [facility] Staff         Please have patient follow up with Eleni in 1-2 weeks to discuss ureteroscopy. Thank you

## 2019-12-13 NOTE — PLAN OF CARE
No DC needs from CM perspective.    Pt states friend can drive her home.       12/13/19 1000   Final Note   Assessment Type Final Discharge Note   Anticipated Discharge Disposition Home   What phone number can be called within the next 1-3 days to see how you are doing after discharge? [phone]   Hospital Follow Up  Appt(s) scheduled? Yes   Discharge plans and expectations educations in teach back method with documentation complete? Yes   Right Care Referral Info   Post Acute Recommendation No Care

## 2019-12-13 NOTE — PLAN OF CARE
Plan of care reviewed with patient. Pt remains free from fall, injury, and skin breakdown. Patient ambulates w/o difficulty. Voiding spontaneously. Patient denies pain but complaints of gas discomfort and medicated with PRN medication. Purposeful hourly rounding done. Safety maintained. Patient lying in bed in no distress. Will continue to monitor.

## 2019-12-13 NOTE — SUBJECTIVE & OBJECTIVE
Interval History:  No complaints this AM, resting in bed     Objective:     Temp:  [98.3 °F (36.8 °C)-99.2 °F (37.3 °C)] 99.2 °F (37.3 °C)  Pulse:  [] 80  Resp:  [18-20] 18  SpO2:  [96 %-100 %] 96 %  BP: (115-128)/(71-75) 128/73     Body mass index is 22.14 kg/m².           Drains     None                 Physical Exam   Constitutional: She is oriented to person, place, and time. She appears well-developed and well-nourished. No distress.   HENT:   Head: Normocephalic and atraumatic.   Eyes: No scleral icterus.   Neck: No tracheal deviation present.   Pulmonary/Chest: Effort normal. No respiratory distress.   Abdominal: Soft. She exhibits no distension. There is no tenderness.   Musculoskeletal: Normal range of motion.   Neurological: She is alert and oriented to person, place, and time.   Skin: Skin is warm and dry.     Psychiatric: She has a normal mood and affect. Her behavior is normal.       Significant Labs:    BMP:  Recent Labs   Lab 12/11/19 0417 12/12/19 0419 12/13/19  0543     139 142 141   K 4.2  4.2 3.2* 3.5     110 114* 112*   CO2 23  23 22* 24   BUN 13  13 10 4*   CREATININE 0.8  0.8 0.6 0.6   CALCIUM 8.3*  8.3* 7.5* 8.3*       CBC:  Recent Labs   Lab 12/11/19 0417 12/12/19 0419 12/13/19  0543   WBC 19.86*  19.86* 10.40 8.47   HGB 11.2*  11.2* 9.6* 9.8*   HCT 32.8*  32.8* 28.7* 29.7*     194 155 166       All pertinent labs results from the past 24 hours have been reviewed.    Significant Imaging:  CT: I have reviewed all results within the past 24 hours and my personal findings are:  as below   I have reviewed the CT scan from 12/10/19   Adrenal glands: unremarkable bilaterally.   Left kidney is free of stones or masses, no hydronephrosis apparent. Left ureter has a normal course and caliber. There are no left ureteral stones present.   Right kidney is free of stones or masses, hydronephrosis associated with proximal right ureteral stone measuring 3-4mm.    Bladder is unremarkable.   Uterus with IUD present.     Review of Systems

## 2019-12-13 NOTE — PROGRESS NOTES
[facility]  Urology  Progress Note    Patient Name: Delaney Arechiga  MRN: [id number]  Admission Date: 12/10/2019  Hospital Length of Stay: 3 days  Code Status: Full Code   Attending Provider: Abeba West MD   Primary Care Physician: Ale Quinn MD    Subjective:     HPI:  This is a 29 YOF presents to ER via EMS with R flank pain, R abdominal pain and dysuria. She had dysuria a few days ago but took OTC medications that gave her relief. She began to have R flank pain last night but woke up this AM with R flank and abdominal pain that was severe. She reports the dysuria started again as well. She denies gross hematuria. She has felt chills but has not checked her temperature at home. She has not been able to tolerate PO intake. She reports vomiting multiple times since this morning, most recently on the floor of the ED lobby.     CT scan done in ED shows a 3-4mm stone in the proximal Right ureter with associated hydronephrosis.     Upon evaluation, she is afebrile. She appears uncomfortable. She is tachycardic at 107 and her blood pressure monitor currently reads 90s/60s.   No leukocytosis, Cr at baseline.     UA shows many bacteria, >100W, >100R, 1 squam, nitrite positive.     She was given a dose of Cipro in the ED     She denies prior history of kidney stones.     Interval History:  No complaints this AM, resting in bed     Objective:     Temp:  [98.3 °F (36.8 °C)-99.2 °F (37.3 °C)] 99.2 °F (37.3 °C)  Pulse:  [] 80  Resp:  [18-20] 18  SpO2:  [96 %-100 %] 96 %  BP: (115-128)/(71-75) 128/73     Body mass index is 22.14 kg/m².           Drains     None                 Physical Exam   Constitutional: She is oriented to person, place, and time. She appears well-developed and well-nourished. No distress.   HENT:   Head: Normocephalic and atraumatic.   Eyes: No scleral icterus.   Neck: No tracheal deviation present.   Pulmonary/Chest: Effort normal. No respiratory distress.   Abdominal:  Soft. She exhibits no distension. There is no tenderness.   Musculoskeletal: Normal range of motion.   Neurological: She is alert and oriented to person, place, and time.   Skin: Skin is warm and dry.     Psychiatric: She has a normal mood and affect. Her behavior is normal.       Significant Labs:    BMP:  Recent Labs   Lab 12/11/19 0417 12/12/19 0419 12/13/19  0543     139 142 141   K 4.2  4.2 3.2* 3.5     110 114* 112*   CO2 23  23 22* 24   BUN 13  13 10 4*   CREATININE 0.8  0.8 0.6 0.6   CALCIUM 8.3*  8.3* 7.5* 8.3*       CBC:  Recent Labs   Lab 12/11/19 0417 12/12/19 0419 12/13/19  0543   WBC 19.86*  19.86* 10.40 8.47   HGB 11.2*  11.2* 9.6* 9.8*   HCT 32.8*  32.8* 28.7* 29.7*     194 155 166       All pertinent labs results from the past 24 hours have been reviewed.    Significant Imaging:  CT: I have reviewed all results within the past 24 hours and my personal findings are:  as below   I have reviewed the CT scan from 12/10/19   Adrenal glands: unremarkable bilaterally.   Left kidney is free of stones or masses, no hydronephrosis apparent. Left ureter has a normal course and caliber. There are no left ureteral stones present.   Right kidney is free of stones or masses, hydronephrosis associated with proximal right ureteral stone measuring 3-4mm.   Bladder is unremarkable.   Uterus with IUD present.     Review of Systems          Assessment/Plan:     * Ureterolithiasis  S/p R ureteral stent placement 12/10, doing very well  Ucx and bcx growing e. Coli, pan sensitive   Ok to DC with abx  She will need f/u to discuss ureteroscopy in 1-2 weeks     Will need oxybutynin PRN bladder spasms, flomax daily while stent is in place, and pyridium PRN for dysuria          VTE Risk Mitigation (From admission, onward)         Ordered     IP VTE LOW RISK PATIENT  Once      12/10/19 2024     Place LEONOR hose  Until discontinued      12/10/19 2024     Place sequential compression device  Until  discontinued      12/10/19 2024                Cielo Perla MD  Urology  [facility]

## 2019-12-17 ENCOUNTER — PATIENT OUTREACH (OUTPATIENT)
Dept: ADMINISTRATIVE | Facility: CLINIC | Age: 29
End: 2019-12-17

## 2019-12-17 LAB
BACTERIA BLD CULT: NORMAL
BACTERIA BLD CULT: NORMAL

## 2019-12-23 ENCOUNTER — OFFICE VISIT (OUTPATIENT)
Dept: INTERNAL MEDICINE | Facility: CLINIC | Age: 29
End: 2019-12-23
Payer: COMMERCIAL

## 2019-12-23 ENCOUNTER — LAB VISIT (OUTPATIENT)
Dept: LAB | Facility: OTHER | Age: 29
End: 2019-12-23
Attending: INTERNAL MEDICINE
Payer: COMMERCIAL

## 2019-12-23 VITALS
HEIGHT: 63 IN | WEIGHT: 126.13 LBS | DIASTOLIC BLOOD PRESSURE: 86 MMHG | OXYGEN SATURATION: 98 % | HEART RATE: 105 BPM | SYSTOLIC BLOOD PRESSURE: 114 MMHG | BODY MASS INDEX: 22.35 KG/M2

## 2019-12-23 DIAGNOSIS — D64.9 NORMOCYTIC ANEMIA: ICD-10-CM

## 2019-12-23 DIAGNOSIS — N12 PYELONEPHRITIS: Primary | ICD-10-CM

## 2019-12-23 DIAGNOSIS — N80.9 ENDOMETRIOSIS: ICD-10-CM

## 2019-12-23 DIAGNOSIS — B37.31 VAGINAL CANDIDIASIS: ICD-10-CM

## 2019-12-23 DIAGNOSIS — N12 PYELONEPHRITIS: ICD-10-CM

## 2019-12-23 PROBLEM — R78.81 BACTEREMIA: Status: RESOLVED | Noted: 2019-12-11 | Resolved: 2019-12-23

## 2019-12-23 PROBLEM — A41.9 SEPSIS: Status: RESOLVED | Noted: 2019-12-11 | Resolved: 2019-12-23

## 2019-12-23 PROBLEM — E83.42 HYPOMAGNESEMIA: Status: RESOLVED | Noted: 2019-12-11 | Resolved: 2019-12-23

## 2019-12-23 PROBLEM — E83.39 HYPOPHOSPHATEMIA: Status: RESOLVED | Noted: 2019-12-11 | Resolved: 2019-12-23

## 2019-12-23 PROBLEM — E87.6 HYPOKALEMIA: Status: RESOLVED | Noted: 2019-12-12 | Resolved: 2019-12-23

## 2019-12-23 LAB
ALBUMIN SERPL BCP-MCNC: 4.6 G/DL (ref 3.5–5.2)
ALP SERPL-CCNC: 49 U/L (ref 55–135)
ALT SERPL W/O P-5'-P-CCNC: 15 U/L (ref 10–44)
ANION GAP SERPL CALC-SCNC: 10 MMOL/L (ref 8–16)
AST SERPL-CCNC: 12 U/L (ref 10–40)
BACTERIA #/AREA URNS HPF: ABNORMAL /HPF
BASOPHILS # BLD AUTO: 0.04 K/UL (ref 0–0.2)
BASOPHILS NFR BLD: 0.5 % (ref 0–1.9)
BILIRUB SERPL-MCNC: 0.4 MG/DL (ref 0.1–1)
BILIRUB SERPL-MCNC: ABNORMAL MG/DL
BLOOD URINE, POC: 250
BUN SERPL-MCNC: 11 MG/DL (ref 6–20)
CALCIUM SERPL-MCNC: 9.5 MG/DL (ref 8.7–10.5)
CHLORIDE SERPL-SCNC: 103 MMOL/L (ref 95–110)
CO2 SERPL-SCNC: 25 MMOL/L (ref 23–29)
COLOR, POC UA: 1.02
CREAT SERPL-MCNC: 0.9 MG/DL (ref 0.5–1.4)
DIFFERENTIAL METHOD: ABNORMAL
EOSINOPHIL # BLD AUTO: 0.3 K/UL (ref 0–0.5)
EOSINOPHIL NFR BLD: 3.3 % (ref 0–8)
ERYTHROCYTE [DISTWIDTH] IN BLOOD BY AUTOMATED COUNT: 11.1 % (ref 11.5–14.5)
EST. GFR  (AFRICAN AMERICAN): >60 ML/MIN/1.73 M^2
EST. GFR  (NON AFRICAN AMERICAN): >60 ML/MIN/1.73 M^2
GLUCOSE SERPL-MCNC: 85 MG/DL (ref 70–110)
GLUCOSE UR QL STRIP: NORMAL
HCT VFR BLD AUTO: 41.9 % (ref 37–48.5)
HGB BLD-MCNC: 14.2 G/DL (ref 12–16)
IMM GRANULOCYTES # BLD AUTO: 0.03 K/UL (ref 0–0.04)
IMM GRANULOCYTES NFR BLD AUTO: 0.4 % (ref 0–0.5)
KETONES UR QL STRIP: ABNORMAL
LEUKOCYTE ESTERASE URINE, POC: ABNORMAL
LYMPHOCYTES # BLD AUTO: 3.2 K/UL (ref 1–4.8)
LYMPHOCYTES NFR BLD: 39.2 % (ref 18–48)
MCH RBC QN AUTO: 30 PG (ref 27–31)
MCHC RBC AUTO-ENTMCNC: 33.9 G/DL (ref 32–36)
MCV RBC AUTO: 88 FL (ref 82–98)
MICROSCOPIC COMMENT: ABNORMAL
MONOCYTES # BLD AUTO: 0.4 K/UL (ref 0.3–1)
MONOCYTES NFR BLD: 5.3 % (ref 4–15)
NEUTROPHILS # BLD AUTO: 4.2 K/UL (ref 1.8–7.7)
NEUTROPHILS NFR BLD: 51.3 % (ref 38–73)
NITRITE, POC UA: ABNORMAL
NRBC BLD-RTO: 0 /100 WBC
PH, POC UA: 5
PLATELET # BLD AUTO: 530 K/UL (ref 150–350)
PMV BLD AUTO: 9.4 FL (ref 9.2–12.9)
POTASSIUM SERPL-SCNC: 3.6 MMOL/L (ref 3.5–5.1)
PROT SERPL-MCNC: 8.2 G/DL (ref 6–8.4)
PROTEIN, POC: ABNORMAL
RBC # BLD AUTO: 4.74 M/UL (ref 4–5.4)
RBC #/AREA URNS HPF: 19 /HPF (ref 0–4)
SODIUM SERPL-SCNC: 138 MMOL/L (ref 136–145)
SPECIFIC GRAVITY, POC UA: 1.02
SQUAMOUS #/AREA URNS HPF: 7 /HPF
UROBILINOGEN, POC UA: NORMAL
WBC # BLD AUTO: 8.24 K/UL (ref 3.9–12.7)
WBC #/AREA URNS HPF: 9 /HPF (ref 0–5)

## 2019-12-23 PROCEDURE — 36415 COLL VENOUS BLD VENIPUNCTURE: CPT

## 2019-12-23 PROCEDURE — 99215 OFFICE O/P EST HI 40 MIN: CPT | Mod: 25,S$GLB,, | Performed by: INTERNAL MEDICINE

## 2019-12-23 PROCEDURE — 87086 URINE CULTURE/COLONY COUNT: CPT

## 2019-12-23 PROCEDURE — 99999 PR PBB SHADOW E&M-EST. PATIENT-LVL III: CPT | Mod: PBBFAC,,, | Performed by: INTERNAL MEDICINE

## 2019-12-23 PROCEDURE — 81001 POCT URINALYSIS, DIPSTICK OR TABLET REAGENT, AUTOMATED, WITH MICROSCOP: ICD-10-PCS | Mod: S$GLB,,, | Performed by: INTERNAL MEDICINE

## 2019-12-23 PROCEDURE — 3008F BODY MASS INDEX DOCD: CPT | Mod: CPTII,S$GLB,, | Performed by: INTERNAL MEDICINE

## 2019-12-23 PROCEDURE — 85025 COMPLETE CBC W/AUTO DIFF WBC: CPT

## 2019-12-23 PROCEDURE — 81001 URINALYSIS AUTO W/SCOPE: CPT

## 2019-12-23 PROCEDURE — 3008F PR BODY MASS INDEX (BMI) DOCUMENTED: ICD-10-PCS | Mod: CPTII,S$GLB,, | Performed by: INTERNAL MEDICINE

## 2019-12-23 PROCEDURE — 99215 PR OFFICE/OUTPT VISIT, EST, LEVL V, 40-54 MIN: ICD-10-PCS | Mod: 25,S$GLB,, | Performed by: INTERNAL MEDICINE

## 2019-12-23 PROCEDURE — 80053 COMPREHEN METABOLIC PANEL: CPT

## 2019-12-23 PROCEDURE — 99999 PR PBB SHADOW E&M-EST. PATIENT-LVL III: ICD-10-PCS | Mod: PBBFAC,,, | Performed by: INTERNAL MEDICINE

## 2019-12-23 PROCEDURE — 81001 URINALYSIS AUTO W/SCOPE: CPT | Mod: S$GLB,,, | Performed by: INTERNAL MEDICINE

## 2019-12-23 RX ORDER — NITROFURANTOIN (MACROCRYSTALS) 100 MG/1
100 CAPSULE ORAL 2 TIMES DAILY
Qty: 28 CAPSULE | Refills: 0 | Status: SHIPPED | OUTPATIENT
Start: 2019-12-23 | End: 2020-01-06

## 2019-12-23 RX ORDER — FLUCONAZOLE 150 MG/1
150 TABLET ORAL DAILY
Qty: 2 TABLET | Refills: 0 | Status: SHIPPED | OUTPATIENT
Start: 2019-12-23 | End: 2019-12-24

## 2019-12-23 RX ORDER — OXYBUTYNIN CHLORIDE 5 MG/1
5 TABLET ORAL 3 TIMES DAILY PRN
Qty: 30 TABLET | Refills: 0 | Status: SHIPPED | OUTPATIENT
Start: 2019-12-23 | End: 2020-02-19

## 2019-12-23 RX ORDER — ONDANSETRON 4 MG/1
4 TABLET, ORALLY DISINTEGRATING ORAL EVERY 6 HOURS PRN
Qty: 30 TABLET | Refills: 3 | Status: SHIPPED | OUTPATIENT
Start: 2019-12-23 | End: 2020-02-19

## 2019-12-23 NOTE — PROGRESS NOTES
Subjective:       Patient ID: Delaney Arechiga is a 29 y.o. female who  has a past medical history of Abnormal Pap smear of cervix (2012), Family history of breast cancer, Heart murmur (3/25/2019), Migraines, PONV (postoperative nausea and vomiting), S/P diagnostic laparoscopy, excision of endometriosis 5/20/19 (5/20/2019), and Urticaria.    Chief Complaint: Nephrolithiasis; Blood Infection; and Hospital Follow Up     History was obtained from the patient and supplemented through chart review  Saw breast clinic due to high risk breast cancer.     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    HPI    R Pyelonephritis, Hydroureteronephrosis 2/2 nephrolithiasis:  S/p dx laparoscopy  for endometriosis.  Has resulted in chronic urinary issues.  Had UTI 8/2019 in [place].  Was tx with Macrobid, but felt that UTI did not completely resolve.  Felt UTI about 3 days prior to admission.  Treated it with OTC med.  Had a normal day and was able to walk her dog.  The next day wok up with severe flank pain.  Had AMS, emesis in the ED.  Admitted 2 weeks ago for nephrolithiasis with hydronephrosis. CT with R Hydroureteronephrosis 2/2 0.3 cm calculus.  Was on IV Cipro. Initial blood cultures with E coli 4/4 bottles.  Repeat blood cultures no growth.  Cx with E coli, pansensitive. S/p ureteral stent placement.  Leukocytosis improved.  No MAGALY.  D/c with Cipro (completed yesterday), flomax, Pyridium (no longer taking d/t resolution of dysuria), oxybutynin.  Plan for ureteroscopy in 1-2 weeks.  Has follow-up with Urology.      Clindamycin has caused anaphylaxis.  Penicillin caused hives, swelling.  Was on sulfa eye drops and passed out as a child.    Still with urinary urgency, improved R back/flank pain with urination and hurts with walking.  Cloudy appearing urine, urinary odor, frequency.  Has mild, but worsened abd pain from Endometriosis since she was told to stop the Mobic.  No MAGALY.  Has increased hydration, but  forcing herself to eat soup.  +nausea qAM, decreased appetite.  Denies hematuria, dysuria, fever.    Also with yeast infection. Usually requires 2 doses q 4 days for complete resolution.    Normocytic anemia:   Has new normocytic anemia during admission for pyelo as above. Also has endometriosis.  No results found for: IRON, TIBC, FERRITIN, SATURATEDIRO  Lab Results   Component Value Date    [id number] 03/28/2019     No results found for: FOLATE    Endometriosis:    Has excruciating suprapubic cramps every month to the point that she would like a hysterectomy.  Is on the IUD and has had lighter periods.  S/p diagnostic laparoscopy for endometriosis, excision of endometrioma.  No longer having her period since progestin only OCP (aura) was added to IUD.  Follows with Dr. Thompson.  Trial of Mobic, but was d/c'd during admission as above.                Not addressed today.  Depression:  Has been present for her whole life.  Has been on antidepressants since 14 yoa.  Has tried multiple antidepressants, but SSRIs lose their effect afer 6 months.  Effexor caused severe acid reflux, causing her to miss work.  Does not want Elavil for HA ppx 2/2 constipation.  She usually goes back to taking Wellbutrin which helps with the least amount of side effects.  Mood is good on Wellbutrin 150; is happy with the dose. Denies insomnia.   Doing well on Wellbutrin 150, cont.     Migraine with aura:   H/o bitemporal behind both eyes, bioccipatal headaches since 14 yoa, lasting for days, worsened after starting her job.  Imitrex helped in the past (requests brand name Imitrex due to family history of anaphylaxis to sumatriptan).  Has a strong family history of migraines.  Her mom is allergic to Botox injections, so she is hesitant to try Botox.      It is associated with photophobia, phonophobia and nausea.  Tylenol, NSAIDs have not helped in the past.  Triggers include lack of sleep and stress, fluorescent lights.  There is aura with  flashing lights.  On the IUD; OBGYN added OCPs due to severe pain from endometriosis.  Takes Zyrtec daily for urticaria.  Denies rhinorrhea, but with eye tearing not always associated with HA.  Her father also has cluster headache.    Had coverage issues with insurance; rizatriptan was too expensive.  Neurology switched to Fioricet.  No more migraines now that she doesn't have her menstrual cycle.  Cont f/u with Neurology for severe migraines, improved off her menstrual cycle. On Fioricet PRN. Defer OCPs to Neuro, OBGYN     Acne vulgaris:    Follows with Dermatology.  Was prescribed Aldactone and clindamycin/tretinoin gel qHS.  Does apply sunscreen that is included in her makeup. On IUD with OCPs.  Follows with Derm. On Aldactone, clinda/tretinoin gel qHS. Advised sunscreen.    Family history of breast cancer:  MGM, 2 maternal aunts with breast cancer; the youngest was that age 35. PGM with ovarian cancer.  No one in her family has been checked for BRCA mutation.  Saw breast clinic.  Genetic testing negative for clinically significant mutations, but does have a variant of uncertain significance.    Has genetic variant of on certain significance.  Following with breast clinic.    Urticaria:  Takes Claritin daily for hives.  Has not seen an allergist due to cost.  On Claritin daily.  No acute issues.    Heart Murmur:  Murmur has been present since childhood.  Has sharp chest pain about once a week lasting for about a second without SOB, ANN, lightheadedness, dizziness.  Is associated with caffeine intake.  Since childhood.  Very transient sharp CP without other associated symptoms. Assoc with caffeine. If more persistent or increased duration, will pursue TTE    Review of Systems   Constitutional: Positive for appetite change. Negative for fever and unexpected weight change.   HENT: Negative for hearing loss, rhinorrhea and trouble swallowing.    Eyes: Negative for discharge and visual disturbance.   Respiratory:  "Negative for chest tightness and wheezing.    Cardiovascular: Negative for chest pain and palpitations.   Gastrointestinal: Positive for abdominal pain and nausea. Negative for vomiting.   Endocrine: Negative for polydipsia and polyuria.   Genitourinary: Positive for frequency, menstrual problem and urgency. Negative for dysuria and hematuria.   Musculoskeletal: Positive for back pain. Negative for gait problem.   Skin: Negative for rash and wound.   Neurological: Positive for headaches. Negative for weakness.   Hematological: Negative for adenopathy.   Psychiatric/Behavioral: Negative for confusion and dysphoric mood.       I personally reviewed Past Medical History, Past Surgical History, Social History, and Family History.    Objective:      Vitals:    12/23/19 1543   BP: 114/86   Pulse: 105   SpO2: 98%   Weight: 57.2 kg (126 lb 1.7 oz)   Height: 5' 3" (1.6 m)      Physical Exam   Constitutional: She appears well-developed and well-nourished. No distress.   HENT:   Head: Normocephalic and atraumatic.   Nose: Nose normal.   Mouth/Throat: Oropharynx is clear and moist. No oropharyngeal exudate.   Eyes: Pupils are equal, round, and reactive to light. Conjunctivae and EOM are normal. Right eye exhibits no discharge. Left eye exhibits no discharge. No scleral icterus.   Neck: Neck supple. No tracheal deviation present. No thyromegaly present.   Cardiovascular: Normal rate, regular rhythm and intact distal pulses.   No murmur heard.  Pulmonary/Chest: Effort normal and breath sounds normal. No respiratory distress. She has no wheezes.   Abdominal: Soft. Bowel sounds are normal. There is generalized tenderness. There is no CVA tenderness.   Mild generalized TTP.   Musculoskeletal: She exhibits no edema or deformity.   Lymphadenopathy:     She has no cervical adenopathy.   Neurological: She is alert. No cranial nerve deficit. Gait normal.   Skin: Skin is warm and dry. Capillary refill takes less than 2 seconds. No rash " noted. She is not diaphoretic. No erythema.   Psychiatric: She has a normal mood and affect. Her behavior is normal.         Lab Results   Component Value Date    WBC 8.47 12/13/2019    HGB 9.8 (L) 12/13/2019    HCT 29.7 (L) 12/13/2019     12/13/2019    CHOL 148 03/28/2019    TRIG 82 03/28/2019    HDL 59 03/28/2019    ALT 10 12/10/2019    AST 14 12/10/2019     12/13/2019    K 3.5 12/13/2019     (H) 12/13/2019    CREATININE 0.6 12/13/2019    BUN 4 (L) 12/13/2019    CO2 24 12/13/2019    TSH 0.891 03/28/2019    HGBA1C 4.4 03/28/2019       The ASCVD Risk score (Kiana ENRIQUE Jr., et al., 2013) failed to calculate for the following reasons:    The 2013 ASCVD risk score is only valid for ages 40 to 79    (Imaging have been independently reviewed)  CT with R Hydroureteronephrosis 2/2 0.3 cm calculus.    Assessment:       1. Pyelonephritis    2. Vaginal candidiasis    3. Normocytic anemia    4. Endometriosis          Plan:       Delaney was seen today for nephrolithiasis, blood infection and hospital follow up.    Diagnoses and all orders for this visit:    Pyelonephritis  Comments:  Improved, but persistent sx. Repeat labs, UCx. UCx E coli, panS. Many allergy to abx. Hydration, diet as tolerated. Has appt with Uro. Consider IC if persistent  Orders:  -     POCT urinalysis, dipstick or tablet reag  -     Urine culture  -     Urinalysis Microscopic  -     ondansetron (ZOFRAN-ODT) 4 MG TbDL; Take 1 tablet (4 mg total) by mouth every 6 (six) hours as needed (Nausea).  -     CBC auto differential; Future  -     Comprehensive metabolic panel; Future  -     oxybutynin (DITROPAN) 5 MG Tab; Take 1 tablet (5 mg total) by mouth 3 (three) times daily as needed (bladder spasms).    Vaginal candidiasis  Comments:  New.  Diflucan x1.  Okay to repeat dose if persistent.  Orders:  -     fluconazole (DIFLUCAN) 150 MG Tab; Take 1 tablet (150 mg total) by mouth once daily. Ok to repeat if recurrent yeast infection for 1  day    Normocytic anemia  Comments:  Previously normal. Suspect AOCD from Pyleo vs dilutional from IVF. Will defer anemia workup in light of acute illness.  Orders:  -     CBC auto differential; Future    Endometriosis  Comments:  Worsened off Mobic. No MAGALY, so can restart Mobic. Repeat CMP today.  Following with OBGYN,          Side effects of medication(s) were discussed in detail and patient voiced understanding.  Patient will call back for any issues or complications.     RTC in  or sooner PRN for annual, depression.

## 2019-12-24 ENCOUNTER — PATIENT MESSAGE (OUTPATIENT)
Dept: INTERNAL MEDICINE | Facility: CLINIC | Age: 29
End: 2019-12-24

## 2019-12-25 LAB — BACTERIA UR CULT: NORMAL

## 2019-12-27 ENCOUNTER — TELEPHONE (OUTPATIENT)
Dept: UROLOGY | Facility: CLINIC | Age: 29
End: 2019-12-27

## 2019-12-27 ENCOUNTER — OFFICE VISIT (OUTPATIENT)
Dept: UROLOGY | Facility: CLINIC | Age: 29
End: 2019-12-27
Payer: COMMERCIAL

## 2019-12-27 VITALS
HEART RATE: 86 BPM | SYSTOLIC BLOOD PRESSURE: 119 MMHG | WEIGHT: 126 LBS | DIASTOLIC BLOOD PRESSURE: 77 MMHG | HEIGHT: 63 IN | BODY MASS INDEX: 22.32 KG/M2

## 2019-12-27 DIAGNOSIS — N12 PYELONEPHRITIS: ICD-10-CM

## 2019-12-27 DIAGNOSIS — R35.0 URINARY FREQUENCY: Primary | ICD-10-CM

## 2019-12-27 DIAGNOSIS — N20.1 URETEROLITHIASIS: ICD-10-CM

## 2019-12-27 LAB
BILIRUB SERPL-MCNC: ABNORMAL MG/DL
BLOOD URINE, POC: 50
COLOR, POC UA: YELLOW
GLUCOSE UR QL STRIP: ABNORMAL
KETONES UR QL STRIP: ABNORMAL
LEUKOCYTE ESTERASE URINE, POC: ABNORMAL
NITRITE, POC UA: ABNORMAL
PH, POC UA: 5
PROTEIN, POC: 30
SPECIFIC GRAVITY, POC UA: 1.01
UROBILINOGEN, POC UA: ABNORMAL

## 2019-12-27 PROCEDURE — 87086 URINE CULTURE/COLONY COUNT: CPT

## 2019-12-27 PROCEDURE — 81002 URINALYSIS NONAUTO W/O SCOPE: CPT | Mod: S$GLB,,, | Performed by: NURSE PRACTITIONER

## 2019-12-27 PROCEDURE — 81002 POCT URINE DIPSTICK WITHOUT MICROSCOPE: ICD-10-PCS | Mod: S$GLB,,, | Performed by: NURSE PRACTITIONER

## 2019-12-27 PROCEDURE — 99214 OFFICE O/P EST MOD 30 MIN: CPT | Mod: 25,S$GLB,, | Performed by: NURSE PRACTITIONER

## 2019-12-27 PROCEDURE — 3008F BODY MASS INDEX DOCD: CPT | Mod: CPTII,S$GLB,, | Performed by: NURSE PRACTITIONER

## 2019-12-27 PROCEDURE — 3008F PR BODY MASS INDEX (BMI) DOCUMENTED: ICD-10-PCS | Mod: CPTII,S$GLB,, | Performed by: NURSE PRACTITIONER

## 2019-12-27 PROCEDURE — 99214 PR OFFICE/OUTPT VISIT, EST, LEVL IV, 30-39 MIN: ICD-10-PCS | Mod: 25,S$GLB,, | Performed by: NURSE PRACTITIONER

## 2019-12-27 RX ORDER — CIPROFLOXACIN 2 MG/ML
400 INJECTION, SOLUTION INTRAVENOUS
Status: CANCELLED | OUTPATIENT
Start: 2019-12-27

## 2019-12-27 RX ORDER — SODIUM CHLORIDE 9 MG/ML
INJECTION, SOLUTION INTRAVENOUS CONTINUOUS
Status: CANCELLED | OUTPATIENT
Start: 2019-12-27

## 2019-12-27 NOTE — H&P (VIEW-ONLY)
Subjective:      Delaney Arechiga is a 29 y.o. female who returns today regarding her ureteral stone.    The patient is s/p emergent cysto with ureteral stent placement on 12/10 with Dr. England for a 3 mm left ureteral stone. Urine culture was positive for E. Coli at the time. This was treated with macrobid.      She presents today to discuss definitive treatment of her stone. Completed macrobid earlier this week. Negative urine culture on 12/23. Reports bothersome frequency and urgency. Denies dysuria, flank pain, N/V and fever/chills.     The following portions of the patient's history were reviewed and updated as appropriate: allergies, current medications, past family history, past medical history, past social history, past surgical history and problem list.    Review of Systems  Constitutional: no fever or chills  ENT: no nasal congestion or sore throat  Respiratory: no cough or shortness of breath  Cardiovascular: no chest pain or palpitations  Gastrointestinal: no nausea or vomiting, tolerating diet  Genitourinary: as per HPI  Hematologic/Lymphatic: no easy bruising or lymphadenopathy  Musculoskeletal: no arthralgias or myalgias  Neurological: no seizures or tremors  Behavioral/Psych: no auditory or visual hallucinations     Objective:   Vital Signs:  Vitals:    12/27/19 1431   BP: 119/77   Pulse: 86       Physical Exam   General: alert and oriented, no acute distress  Head: normocephalic, atraumatic  Neck: supple, no lymphadenopathy, normal ROM, no masses  Respiratory: Symmetric expansion, non-labored breathing  Cardiovascular: regular rate and rhythm, nomal pulses, no peripheral edema  Abdomen: soft, non tender, non distended, no palpable masses, no hernias, no hepatomegaly or splenomegaly  Pelvic: not examined   Lymphatic: no inguinal nodes  Skin: normal coloration and turgor, no rashes, no suspicious skin lesions noted  Neuro: alert and oriented x3, no gross deficits  Psych: normal judgment and  "insight, normal mood/affect and non-anxious  No CVA tenderness    Lab Review   Urinalysis demonstrates positive for leukocytes (+), red blood cells (50 rosalio/mL), protein (+)  Lab Results   Component Value Date    WBC 8.24 12/23/2019    HGB 14.2 12/23/2019    HCT 41.9 12/23/2019    MCV 88 12/23/2019     (H) 12/23/2019     Lab Results   Component Value Date    CREATININE 0.9 12/23/2019    BUN 11 12/23/2019       Imaging   (all images personally reviewed; agree with report below)  CT stone study (12/10/19)- "The left kidney has a grossly unremarkable noncontrast appearance without hydronephrosis or nephrolithiasis.  The left ureter is grossly unremarkable along its visualized course, no definite left ureteral calculi seen.  There is extensive right perinephric and periureteral inflammation.  There is moderate right hydroureteronephrosis, secondary to a 0.3 cm calculus within the proximal right ureter.  No additional right ureteral calculi seen.  The urinary bladder is unremarkable.  There is an IUD.  The adnexa is unremarkable.  There is trace fluid in the pelvis."    Assessment:   Urinary frequency  Pyelonephritis  Ureterolithiasis     Plan:   Delaney was seen today for new consult and bladder pain.    Diagnoses and all orders for this visit:    Urinary frequency  -     POCT URINE DIPSTICK WITHOUT MICROSCOPE  -     Urine culture    Pyelonephritis    Ureterolithiasis    Plan:  --Repeat culture today, will notify if additional antibiotics are needed   --Will proceed with ureteroscopy with LL and stent placement for definitive treatment of the stone on 1/2 with Dr. Ybarra. Discussed risks and benefits of the procedure. Answered all questions. Consent signed.     "

## 2019-12-29 LAB — BACTERIA UR CULT: NO GROWTH

## 2019-12-31 ENCOUNTER — ANESTHESIA EVENT (OUTPATIENT)
Dept: SURGERY | Facility: OTHER | Age: 29
End: 2019-12-31
Payer: COMMERCIAL

## 2019-12-31 ENCOUNTER — PATIENT MESSAGE (OUTPATIENT)
Dept: UROLOGY | Facility: CLINIC | Age: 29
End: 2019-12-31

## 2019-12-31 ENCOUNTER — TELEPHONE (OUTPATIENT)
Dept: UROLOGY | Facility: CLINIC | Age: 29
End: 2019-12-31

## 2019-12-31 NOTE — TELEPHONE ENCOUNTER
Called and left message for patient. Surgery is schedule for Thursday 1/2/2020.     Please arrive 5am at the [facility] [place].     If any questions patient to call us.      Also sent [facility] message

## 2019-12-31 NOTE — TELEPHONE ENCOUNTER
----- Message from Stacy Prakash MA sent at 12/31/2019 11:16 AM CST -----  Adeline    Can you please call patient regarding her surgery that is schedule for Thursday 1/2/2020.    Please advise her to arrive 5am at the [facility] [place].

## 2020-01-02 ENCOUNTER — ANESTHESIA (OUTPATIENT)
Dept: SURGERY | Facility: OTHER | Age: 30
End: 2020-01-02
Payer: COMMERCIAL

## 2020-01-02 ENCOUNTER — HOSPITAL ENCOUNTER (OUTPATIENT)
Facility: OTHER | Age: 30
Discharge: HOME | End: 2020-01-02
Attending: UROLOGY | Admitting: UROLOGY
Payer: COMMERCIAL

## 2020-01-02 VITALS
RESPIRATION RATE: 18 BRPM | HEIGHT: 63 IN | WEIGHT: 126 LBS | SYSTOLIC BLOOD PRESSURE: 120 MMHG | HEART RATE: 100 BPM | OXYGEN SATURATION: 99 % | TEMPERATURE: 99 F | BODY MASS INDEX: 22.32 KG/M2 | DIASTOLIC BLOOD PRESSURE: 74 MMHG

## 2020-01-02 DIAGNOSIS — N20.1 URETEROLITHIASIS: ICD-10-CM

## 2020-01-02 DIAGNOSIS — R35.0 URINARY FREQUENCY: ICD-10-CM

## 2020-01-02 LAB
B-HCG UR QL: NEGATIVE
CTP QC/QA: YES

## 2020-01-02 PROCEDURE — 82365 CALCULUS SPECTROSCOPY: CPT

## 2020-01-02 PROCEDURE — 88300 SURGICAL PATH GROSS: CPT | Performed by: PATHOLOGY

## 2020-01-02 PROCEDURE — 63600175 PHARM REV CODE 636 W HCPCS: Performed by: NURSE PRACTITIONER

## 2020-01-02 PROCEDURE — 52352 PR CYSTO/URETERO/PYELOSCOPY, CALCULUS TX: ICD-10-PCS | Mod: RT,,, | Performed by: UROLOGY

## 2020-01-02 PROCEDURE — 63600175 PHARM REV CODE 636 W HCPCS: Performed by: NURSE ANESTHETIST, CERTIFIED REGISTERED

## 2020-01-02 PROCEDURE — 63600175 PHARM REV CODE 636 W HCPCS: Performed by: ANESTHESIOLOGY

## 2020-01-02 PROCEDURE — 25000003 PHARM REV CODE 250: Performed by: ANESTHESIOLOGY

## 2020-01-02 PROCEDURE — 71000015 HC POSTOP RECOV 1ST HR: Performed by: UROLOGY

## 2020-01-02 PROCEDURE — 36000706: Performed by: UROLOGY

## 2020-01-02 PROCEDURE — 52352 CYSTOURETERO W/STONE REMOVE: CPT | Mod: RT,,, | Performed by: UROLOGY

## 2020-01-02 PROCEDURE — 37000008 HC ANESTHESIA 1ST 15 MINUTES: Performed by: UROLOGY

## 2020-01-02 PROCEDURE — 81025 URINE PREGNANCY TEST: CPT | Performed by: ANESTHESIOLOGY

## 2020-01-02 PROCEDURE — 88300 SURGICAL PATH GROSS: CPT | Mod: 26,,, | Performed by: PATHOLOGY

## 2020-01-02 PROCEDURE — C1769 GUIDE WIRE: HCPCS | Performed by: UROLOGY

## 2020-01-02 PROCEDURE — 37000009 HC ANESTHESIA EA ADD 15 MINS: Performed by: UROLOGY

## 2020-01-02 PROCEDURE — 88300 PR  SURG PATH,GROSS,LEVEL I: ICD-10-PCS | Mod: 26,,, | Performed by: PATHOLOGY

## 2020-01-02 PROCEDURE — 25000003 PHARM REV CODE 250: Performed by: NURSE ANESTHETIST, CERTIFIED REGISTERED

## 2020-01-02 PROCEDURE — 36000707: Performed by: UROLOGY

## 2020-01-02 PROCEDURE — 25000003 PHARM REV CODE 250: Performed by: UROLOGY

## 2020-01-02 PROCEDURE — 71000016 HC POSTOP RECOV ADDL HR: Performed by: UROLOGY

## 2020-01-02 PROCEDURE — 71000033 HC RECOVERY, INTIAL HOUR: Performed by: UROLOGY

## 2020-01-02 PROCEDURE — 76000 PR  FLUOROSCOPE EXAMINATION: ICD-10-PCS | Mod: 26,59,, | Performed by: UROLOGY

## 2020-01-02 PROCEDURE — 76000 FLUOROSCOPY <1 HR PHYS/QHP: CPT | Mod: 26,59,, | Performed by: UROLOGY

## 2020-01-02 PROCEDURE — 27201423 OPTIME MED/SURG SUP & DEVICES STERILE SUPPLY: Performed by: UROLOGY

## 2020-01-02 RX ORDER — SODIUM CHLORIDE 9 MG/ML
INJECTION, SOLUTION INTRAVENOUS CONTINUOUS
Status: DISCONTINUED | OUTPATIENT
Start: 2020-01-02 | End: 2020-01-02 | Stop reason: HOSPADM

## 2020-01-02 RX ORDER — MEPERIDINE HYDROCHLORIDE 25 MG/ML
12.5 INJECTION INTRAMUSCULAR; INTRAVENOUS; SUBCUTANEOUS ONCE AS NEEDED
Status: DISCONTINUED | OUTPATIENT
Start: 2020-01-02 | End: 2020-01-02 | Stop reason: HOSPADM

## 2020-01-02 RX ORDER — ONDANSETRON 8 MG/1
8 TABLET, ORALLY DISINTEGRATING ORAL EVERY 8 HOURS PRN
Status: DISCONTINUED | OUTPATIENT
Start: 2020-01-02 | End: 2020-01-02 | Stop reason: HOSPADM

## 2020-01-02 RX ORDER — PROPOFOL 10 MG/ML
VIAL (ML) INTRAVENOUS
Status: DISCONTINUED | OUTPATIENT
Start: 2020-01-02 | End: 2020-01-02

## 2020-01-02 RX ORDER — CIPROFLOXACIN 2 MG/ML
400 INJECTION, SOLUTION INTRAVENOUS
Status: COMPLETED | OUTPATIENT
Start: 2020-01-02 | End: 2020-01-02

## 2020-01-02 RX ORDER — HYDROMORPHONE HYDROCHLORIDE 2 MG/ML
0.4 INJECTION, SOLUTION INTRAMUSCULAR; INTRAVENOUS; SUBCUTANEOUS EVERY 5 MIN PRN
Status: DISCONTINUED | OUTPATIENT
Start: 2020-01-02 | End: 2020-01-02 | Stop reason: HOSPADM

## 2020-01-02 RX ORDER — DIPHENHYDRAMINE HYDROCHLORIDE 50 MG/ML
12.5 INJECTION INTRAMUSCULAR; INTRAVENOUS EVERY 30 MIN PRN
Status: DISCONTINUED | OUTPATIENT
Start: 2020-01-02 | End: 2020-01-02 | Stop reason: HOSPADM

## 2020-01-02 RX ORDER — HYDROCODONE BITARTRATE AND ACETAMINOPHEN 5; 325 MG/1; MG/1
1 TABLET ORAL EVERY 6 HOURS PRN
Status: DISCONTINUED | OUTPATIENT
Start: 2020-01-02 | End: 2020-01-02 | Stop reason: HOSPADM

## 2020-01-02 RX ORDER — IBUPROFEN 600 MG/1
600 TABLET ORAL EVERY 8 HOURS PRN
Qty: 30 TABLET | Refills: 0 | Status: SHIPPED | OUTPATIENT
Start: 2020-01-02 | End: 2020-06-24

## 2020-01-02 RX ORDER — GLYCOPYRROLATE 0.2 MG/ML
INJECTION INTRAMUSCULAR; INTRAVENOUS
Status: DISCONTINUED | OUTPATIENT
Start: 2020-01-02 | End: 2020-01-02

## 2020-01-02 RX ORDER — ONDANSETRON 2 MG/ML
4 INJECTION INTRAMUSCULAR; INTRAVENOUS DAILY PRN
Status: DISCONTINUED | OUTPATIENT
Start: 2020-01-02 | End: 2020-01-02 | Stop reason: HOSPADM

## 2020-01-02 RX ORDER — IBUPROFEN 600 MG/1
600 TABLET ORAL EVERY 6 HOURS PRN
Status: DISCONTINUED | OUTPATIENT
Start: 2020-01-02 | End: 2020-01-02 | Stop reason: HOSPADM

## 2020-01-02 RX ORDER — ONDANSETRON HYDROCHLORIDE 2 MG/ML
INJECTION, SOLUTION INTRAMUSCULAR; INTRAVENOUS
Status: DISCONTINUED | OUTPATIENT
Start: 2020-01-02 | End: 2020-01-02

## 2020-01-02 RX ORDER — FENTANYL CITRATE 50 UG/ML
INJECTION, SOLUTION INTRAMUSCULAR; INTRAVENOUS
Status: DISCONTINUED | OUTPATIENT
Start: 2020-01-02 | End: 2020-01-02

## 2020-01-02 RX ORDER — SCOLOPAMINE TRANSDERMAL SYSTEM 1 MG/1
PATCH, EXTENDED RELEASE TRANSDERMAL
Status: DISCONTINUED | OUTPATIENT
Start: 2020-01-02 | End: 2020-01-02

## 2020-01-02 RX ORDER — FAMOTIDINE 20 MG/1
20 TABLET, FILM COATED ORAL
Status: COMPLETED | OUTPATIENT
Start: 2020-01-02 | End: 2020-01-02

## 2020-01-02 RX ORDER — DEXAMETHASONE SODIUM PHOSPHATE 4 MG/ML
INJECTION, SOLUTION INTRA-ARTICULAR; INTRALESIONAL; INTRAMUSCULAR; INTRAVENOUS; SOFT TISSUE
Status: DISCONTINUED | OUTPATIENT
Start: 2020-01-02 | End: 2020-01-02

## 2020-01-02 RX ORDER — LIDOCAINE HCL/PF 100 MG/5ML
SYRINGE (ML) INTRAVENOUS
Status: DISCONTINUED | OUTPATIENT
Start: 2020-01-02 | End: 2020-01-02

## 2020-01-02 RX ORDER — SODIUM CHLORIDE, SODIUM LACTATE, POTASSIUM CHLORIDE, CALCIUM CHLORIDE 600; 310; 30; 20 MG/100ML; MG/100ML; MG/100ML; MG/100ML
INJECTION, SOLUTION INTRAVENOUS CONTINUOUS
Status: DISCONTINUED | OUTPATIENT
Start: 2020-01-02 | End: 2020-01-02 | Stop reason: HOSPADM

## 2020-01-02 RX ORDER — PROMETHAZINE HYDROCHLORIDE 25 MG/1
25 TABLET ORAL EVERY 6 HOURS PRN
Status: DISCONTINUED | OUTPATIENT
Start: 2020-01-02 | End: 2020-01-02 | Stop reason: HOSPADM

## 2020-01-02 RX ORDER — KETOROLAC TROMETHAMINE 30 MG/ML
INJECTION, SOLUTION INTRAMUSCULAR; INTRAVENOUS
Status: DISCONTINUED | OUTPATIENT
Start: 2020-01-02 | End: 2020-01-02

## 2020-01-02 RX ORDER — PHENAZOPYRIDINE HYDROCHLORIDE 200 MG/1
200 TABLET, FILM COATED ORAL ONCE
Status: COMPLETED | OUTPATIENT
Start: 2020-01-02 | End: 2020-01-02

## 2020-01-02 RX ORDER — SODIUM CHLORIDE 0.9 % (FLUSH) 0.9 %
3 SYRINGE (ML) INJECTION
Status: DISCONTINUED | OUTPATIENT
Start: 2020-01-02 | End: 2020-01-02 | Stop reason: HOSPADM

## 2020-01-02 RX ADMIN — PROPOFOL 150 MG: 10 INJECTION, EMULSION INTRAVENOUS at 07:01

## 2020-01-02 RX ADMIN — LIDOCAINE HYDROCHLORIDE 100 MG: 20 INJECTION, SOLUTION INTRAVENOUS at 07:01

## 2020-01-02 RX ADMIN — FENTANYL CITRATE 50 MCG: 50 INJECTION, SOLUTION INTRAMUSCULAR; INTRAVENOUS at 07:01

## 2020-01-02 RX ADMIN — ONDANSETRON 4 MG: 2 INJECTION, SOLUTION INTRAMUSCULAR; INTRAVENOUS at 07:01

## 2020-01-02 RX ADMIN — DEXAMETHASONE SODIUM PHOSPHATE 8 MG: 4 INJECTION, SOLUTION INTRAMUSCULAR; INTRAVENOUS at 07:01

## 2020-01-02 RX ADMIN — SODIUM CHLORIDE, SODIUM LACTATE, POTASSIUM CHLORIDE, AND CALCIUM CHLORIDE: 600; 310; 30; 20 INJECTION, SOLUTION INTRAVENOUS at 06:01

## 2020-01-02 RX ADMIN — KETOROLAC TROMETHAMINE 30 MG: 30 INJECTION, SOLUTION INTRAMUSCULAR; INTRAVENOUS at 07:01

## 2020-01-02 RX ADMIN — PHENAZOPYRIDINE 200 MG: 200 TABLET ORAL at 09:01

## 2020-01-02 RX ADMIN — CIPROFLOXACIN 400 MG: 2 INJECTION, SOLUTION INTRAVENOUS at 07:01

## 2020-01-02 RX ADMIN — FAMOTIDINE 20 MG: 20 TABLET, FILM COATED ORAL at 05:01

## 2020-01-02 RX ADMIN — DIPHENHYDRAMINE HYDROCHLORIDE 12.5 MG: 50 INJECTION, SOLUTION INTRAMUSCULAR; INTRAVENOUS at 09:01

## 2020-01-02 RX ADMIN — GLYCOPYRROLATE 0.2 MG: 0.2 INJECTION, SOLUTION INTRAMUSCULAR; INTRAVENOUS at 07:01

## 2020-01-02 RX ADMIN — CARBOXYMETHYLCELLULOSE SODIUM 2 DROP: 2.5 SOLUTION/ DROPS OPHTHALMIC at 07:01

## 2020-01-02 NOTE — INTERVAL H&P NOTE
The patient has been examined and the H&P has been reviewed:    I concur with the findings and no changes have occurred since H&P was written.   Urine cs neg  Reviewed images and verified the RIGHT side is the correct side  Site marked    Anesthesia/Surgery risks, benefits and alternative options discussed and understood by patient/family.          Active Hospital Problems    Diagnosis  POA    Urinary frequency [R35.0]  Yes      Resolved Hospital Problems   No resolved problems to display.

## 2020-01-02 NOTE — OP NOTE
[facility]  Operative Note    Date: 01/02/2020    Pre-Op Diagnosis: right ureteral stone  Patient Active Problem List    Diagnosis Date Noted    Urinary frequency 01/02/2020    Normocytic anemia 12/23/2019    Ureterolithiasis 12/10/2019    Obstruction of right ureter     Pyelonephritis     Heart murmur 03/25/2019    Urticaria     Family history of breast cancer     Acne vulgaris 03/22/2019    Endometriosis 03/22/2019    Moderate episode of recurrent major depressive disorder 03/22/2019    History of posttraumatic stress disorder (PTSD) 03/22/2019    Migraine headache with aura 03/22/2019       Post-Op Diagnosis: same    Procedure(s) Performed:   1.  Right ureteroscopy  2.  Cystoscopy  3.  Stone basket extraction  4.  Right ureteral stent removal  5.  Fluoro < 1 h    Specimen(s): right ureteral stone for analysis    Staff Surgeon: Eddi Ybarra MD    Assistant Surgeon: Lele Madsen MD    Anesthesia: General LMA anesthesia    Indications: Delaney Arechiga is a 29 y.o. female with a right ureteral stone, presenting for definitive stone management.  She currently does have a JJ ureteral stent in place. Stent was placed when patient had pyelonephritis and she was treated with culture appropriate Macrobid.     Findings:   - radiopaque stone identified on fluoroscopy in proximal ureter between L3-L4  - small stone encountered in proximal right ureter; removed with basket without need for laser lithotripsy    1 baskets were used throughout the case.      Estimated Blood Loss: min    Drains: none    Procedure in detail:  After informed consent was obtained, the patient was brought the the cystoscopy suite and placed in the supine position.  SCDs were applied and working.  Anesthesia was administered.  The patient was then placed in the dorsal lithotomy position and prepped and draped in the usual sterile fashion.      A rigid cystoscope in a 22 Fr sheath was introduced into the patient's  urethra.  This passed easily.  The entire urethra was visualized which showed no strictures or masses.  Formal cystoscopy was performed which revealed no masses or lesions suspicious for malignancy, no bladder stones, no bladder diverticuli, no trabeculations.  The ureteral orifices were visualized in the normal anatomic position bilaterally and clear efflux was visualized.  A ureteral stent was visualized at the right ureteral orifice.    A motion wire was passed up the right ureteral orifice and up into the kidney.  This passed easily and placement was confirmed using fluoro.  The cystoscope was removed keeping the wire in place. The cystoscope was reinserted and a pair of alligator stent graspers was used to remove the ureteral stent    An 8 Fr rigid ureteroscope was passed into the patient's bladder alongside the wire under direct vision.  It was then passed through the right ureteral orifice alongside the wire.  A stone was encountered at the level of the proximal ureter.  A Nitinol tipless basket was introduced through the ureteroscope.  The stone was grasped and the stone and ureteroscope were removed. The wire was then removed. The cystoscope was reinserted and the bladder was drained.    The patient tolerated the procedure well and was transferred to the recovery room in stable condition.      Disposition:  The patient will follow up with Eleni Rivero NP, in 6 weeks with a renal US.      Lele Madsen MD

## 2020-01-02 NOTE — ANESTHESIA POSTPROCEDURE EVALUATION
Anesthesia Post Evaluation    Patient: Delaney Arechiga    Procedure(s) Performed: Procedure(s) (LRB):  REMOVAL, CALCULUS, URETER, URETEROSCOPIC (Right)    Final Anesthesia Type: general    Patient location during evaluation: PACU  Patient participation: Yes- Able to Participate  Level of consciousness: oriented and awake  Post-procedure vital signs: reviewed and stable  Pain management: adequate  Airway patency: patent    PONV status at discharge: No PONV  Anesthetic complications: no      Cardiovascular status: hemodynamically stable  Respiratory status: unassisted, spontaneous ventilation and room air  Hydration status: euvolemic  Follow-up not needed.          Vitals Value Taken Time   /72 1/2/2020  8:55 AM   Temp 37.1 °C (98.7 °F) 1/2/2020  8:55 AM   Pulse 104 1/2/2020  8:55 AM   Resp 16 1/2/2020  8:55 AM   SpO2 98 % 1/2/2020  8:55 AM         Event Time     Out of Recovery 08:51:44          Pain/Gilda Score: Gilda Score: 9 (1/2/2020  8:30 AM)

## 2020-01-02 NOTE — INTERVAL H&P NOTE
The patient has been examined and the H&P has been reviewed:    I concur with the findings and no changes have occurred since H&P was written.   Patient has not passed a stone. Urine culture from 12/27 showed no growth.    Anesthesia/Surgery risks, benefits and alternative options discussed and understood by patient/family.          Active Hospital Problems    Diagnosis  POA    Urinary frequency [R35.0]  Yes      Resolved Hospital Problems   No resolved problems to display.

## 2020-01-02 NOTE — TRANSFER OF CARE
"Anesthesia Transfer of Care Note    Patient: Delaney Arechiga    Procedure(s) Performed: Procedure(s) (LRB):  REMOVAL, CALCULUS, URETER, URETEROSCOPIC (Right)    Patient location: PACU    Anesthesia Type: general    Transport from OR: Transported from OR on 2-3 L/min O2 by NC with adequate spontaneous ventilation    Post pain: adequate analgesia    Post assessment: no apparent anesthetic complications    Post vital signs: stable    Level of consciousness: awake and alert    Nausea/Vomiting: no nausea/vomiting    Complications: none    Transfer of care protocol was followed      Last vitals:   Visit Vitals  /60 (BP Location: Left arm, Patient Position: Lying)   Pulse 84   Temp 36.6 °C (97.8 °F) (Oral)   Resp 16   Ht 5' 3" (1.6 m)   Wt 57.2 kg (126 lb)   LMP  (Within Months)   Breastfeeding? No   BMI 22.32 kg/m²     "

## 2020-01-02 NOTE — DISCHARGE SUMMARY
[facility]  Discharge Note  Short Stay    Admit Date: 1/2/2020    Discharge Date and Time: 01/02/2020 7:55 AM      Attending Physician: Eddi Ybarra MD     Discharge Provider: Lele Madsen    Diagnoses:  Active Hospital Problems    Diagnosis  POA    Urinary frequency [R35.0]  Yes    Ureterolithiasis [N20.1]  Yes    Obstruction of right ureter [N13.5]  Yes      Resolved Hospital Problems   No resolved problems to display.       Discharged Condition: good    Hospital Course: Patient was admitted for right ureteroscopy and tolerated the procedure well with no complications. The patient was discharged home in good condition on the same day.  She will follow up in 6 weeks with Eleni Rivero NP, with a renal US.    Final Diagnoses: Same as principal problem.    Disposition: Home or Self Care    Follow up/Patient Instructions:    Medications:  Reconciled Home Medications:   Current Discharge Medication List      START taking these medications    Details   ibuprofen (ADVIL,MOTRIN) 600 MG tablet Take 1 tablet (600 mg total) by mouth every 8 (eight) hours as needed for Pain.  Qty: 30 tablet, Refills: 0         CONTINUE these medications which have NOT CHANGED    Details   buPROPion (WELLBUTRIN XL) 150 MG TB24 tablet Take 1 tablet (150 mg total) by mouth once daily.  Qty: 90 tablet, Refills: 3    Associated Diagnoses: Moderate episode of recurrent major depressive disorder      loratadine (CLARITIN) 10 mg tablet Take 10 mg by mouth once daily.      nitrofurantoin (MACRODANTIN) 100 MG capsule Take 1 capsule (100 mg total) by mouth 2 (two) times daily. for 14 days  Qty: 28 capsule, Refills: 0    Associated Diagnoses: Pyelonephritis      ondansetron (ZOFRAN-ODT) 4 MG TbDL Take 1 tablet (4 mg total) by mouth every 6 (six) hours as needed (Nausea).  Qty: 30 tablet, Refills: 3    Associated Diagnoses: Pyelonephritis      oxybutynin (DITROPAN) 5 MG Tab Take 1 tablet (5 mg total) by mouth 3 (three) times daily as  needed (bladder spasms).  Qty: 30 tablet, Refills: 0    Associated Diagnoses: Pyelonephritis      spironolactone (ALDACTONE) 100 MG tablet Take 1 tablet (100 mg total) by mouth every evening. Start with 1 po qday, increase to 2 po qday as tolerated  Qty: 90 tablet, Refills: 3    Associated Diagnoses: Acne vulgaris      tamsulosin (FLOMAX) 0.4 mg Cap Take 1 capsule (0.4 mg total) by mouth once daily.  Qty: 30 capsule, Refills: 0      levonorgestrel (MIRENA) 20 mcg/24 hr (5 years) IUD 1 each by Intrauterine route once.      norethindrone (AYGESTIN) 5 mg Tab Take 1 tablet (5 mg total) by mouth once daily.  Qty: 30 tablet, Refills: 1    Associated Diagnoses: Endometriosis           Discharge Procedure Orders   US Retroperitoneal Complete   Standing Status: Future Standing Exp. Date: 01/02/21     Order Specific Question Answer Comments   May the Radiologist modify the order per protocol to meet the clinical needs of the patient? Yes      Diet general     Call MD for:  extreme fatigue     Call MD for:  persistent dizziness or light-headedness     Call MD for:  hives     Call MD for:  redness, tenderness, or signs of infection (pain, swelling, redness, odor or green/yellow discharge around incision site)     Call MD for:  difficulty breathing, headache or visual disturbances     Call MD for:  severe uncontrolled pain     Call MD for:  persistent nausea and vomiting     Call MD for:  temperature >100.4     Follow-up Information     Eleni Rivero NP In 6 weeks.    Specialty:  Urology  Contact information:  [address] [facility] [address] [phone]

## 2020-01-02 NOTE — OR NURSING
"Pt repeatedly requesting antibiotics to go home with, scared she will "get sepsis" also requesting Diflucan to help with yeast infection she has had for month due to all the antibiotics. Dr. Ybarra notified, stated that pt did not have a yeast infection and pyridium order placed.  "

## 2020-01-02 NOTE — DISCHARGE INSTRUCTIONS
Treating Kidney Stones: Ureteroscopic Stone Removal    Ureteroscopic stone removal may be done before, after, or instead of other treatments. If you need this procedure, your healthcare provider will discuss its risks and possible complications. You will be told how to prepare. And you will be told about anesthesia that will keep you pain-free during treatment.     A ureteroscope lets your doctor see your stone before removing it.   Removing the stone through the ureter  Ureteroscopic stone removal extracts a small stone in your ureter without an incision. Your doctor places a viewing tube (ureteroscope) in your ureter. A wire basket inserted through the tube removes the stone. Sometimes, a laser or a mechanical device is used to break up the stone. A soft tube may be left in your ureter briefly to drain urine.     The stone may be fragmented. The stone is then withdrawn or passed.   Your recovery  This is an outpatient or overnight procedure. For a few days after surgery, you may feel some pain when you urinate. Or you may need to urinate more often, or have bloody urine. You may have a ureteral stent. This is a soft tube that prevents blockage from swelling after the procedure. The stent is removed when the swelling goes down, often within days. Follow up as instructed to check for any new stones.  When to call your healthcare provider  Call your healthcare provider right away if:  · You have sudden pain or flank pain  · You have a fever over 100.4°F (38°C)  · You have nausea that lasts for days  · You have heavy bleeding when you urinate  · You have heavy bleeding through your drainage tube  · You have swelling or redness around your incision   Date Last Reviewed: 2/1/2017 © 2000-2017 [employer]. [address] [place] [address], [place] [address]. All rights reserved. This information is not intended as a substitute for professional medical care. Always follow your healthcare professional's  instructions.      Anesthesia: After Your Surgery  Youve just had surgery. During surgery, you received medication called anesthesia to keep you comfortable and pain-free. After surgery, you may experience some pain or nausea. This is common. Here are some tips for feeling better and recovering after surgery.    Going home  Your doctor or nurse will show you how to take care of yourself when you go home. He or she will also answer your questions. Have an adult family member or friend drive you home. For the first 24 hours after your surgery:  · Do not drive or use heavy equipment.  · Do not make important decisions or sign legal documents.  · Avoid alcohol.  · Have someone stay with you, if needed. He or she can watch for problems and help keep you safe.  · Take your time getting up from a seated or lying position. You may experience dizziness for 24 hours  Be sure to keep all follow-up appointments with your doctor. And rest after your procedure for as long as your doctor tells you to.    Coping with pain  If you have pain after surgery, pain medication will help you feel better. Take it as directed, before pain becomes severe. Also, ask your doctor or pharmacist about other ways to control pain, such as with heat, ice, and relaxation. And follow any other instructions your surgeon or nurse gives you.    URINARY RETENTION  Should you experience a decrease in your urine output or are unable to urinate following surgery, this can be due to the medications given during surgery.  We recommend you going to the nearest Emergency Department.    Tips for taking pain medication  To get the best relief possible, remember these points:  · Pain medications can upset your stomach. Taking them with a little food may help.  · Most pain relievers taken by mouth need at least 20 to 30 minutes to take effect.  · Taking medication on a schedule can help you remember to take it. Try to time your medication so that you can take it  before beginning an activity, such as dressing, walking, or sitting down for dinner.  · Constipation is a common side effect of pain medications. Contact your doctor before taking any medications like laxatives or stool softeners to help relieve constipation. Also ask about any dietary restrictions, because drinking lots of fluids and eating foods like fruits and vegetables that are high in fiber can also help. Remember, dont take laxatives unless your surgeon has prescribed them.  · Mixing alcohol and pain medication can cause dizziness and slow your breathing. It can even be fatal. Dont drink alcohol while taking pain medication.  · Pain medication can slow your reflexes. Dont drive or operate machinery while taking pain medication.  If your health care provider tells you to take acetaminophen to help relieve your pain, ask him or her how much you are supposed to take each day. (Acetaminophen is the generic name for Tylenol and other brand-name pain relievers.) Acetaminophen or other pain relievers may interact with your prescription medicines or other over-the-counter (OTC) drugs. Some prescription medications contain acetaminophen along with other active ingredients. Using both prescription and OTC acetaminophen for pain can cause you to overdose. The FDA recommends that you read the labels on your OTC medications carefully. This will help you to clearly understand the list of active ingredients, dosing instructions, and any warnings. It may also help you avoid taking too much acetaminophen. If you have questions or don't understand the information, ask your pharmacist or health care provider to explain it to you before you take the OTC medication.    Managing nausea  Some people have an upset stomach after surgery. This is often due to anesthesia, pain, pain medications, or the stress of surgery. The following tips will help you manage nausea and get good nutrition as you recover. If you were on a special  diet before surgery, ask your doctor if you should follow it during recovery. These tips may help:  · Dont push yourself to eat. Your body will tell you when to eat and how much.  · Start off with clear liquids and soup. They are easier to digest.  · Progress to semi-solid foods (mashed potatoes, applesauce, and gelatin) as you feel ready.  · Slowly move to solid foods. Dont eat fatty, rich, or spicy foods at first.  · Dont force yourself to have three large meals a day. Instead, eat smaller amounts more often.  · Take pain medications with a small amount of solid food, such as crackers or toast to avoid nausea.      Call your surgeon if    · You feel too sleepy, dizzy, or groggy (medication may be too strong).  · You have side effects like nausea, vomiting, or skin changes (rash, itching, or hives).   © 1069-8430 [employer]. [address] [place] [address], [place] [address]. All rights reserved. This information is not intended as a substitute for professional medical care. Always follow your healthcare professional's instructions.

## 2020-01-02 NOTE — ANESTHESIA PREPROCEDURE EVALUATION
01/02/2020  Delaney Arechiga is a 29 y.o., female.    Anesthesia Evaluation    I have reviewed the Patient Summary Reports.    I have reviewed the Nursing Notes.   I have reviewed the Medications.     Review of Systems  Anesthesia Hx:  Denies Family Hx of Anesthesia complications.  Personal Hx of Anesthesia complications, Post-Operative Nausea/Vomiting   Social:  Non-Smoker    Hematology/Oncology:  Hematology Normal   Oncology Normal     EENT/Dental:EENT/Dental Normal   Cardiovascular:  Cardiovascular Normal     Pulmonary:  Pulmonary Normal    Renal/:   renal calculi    Hepatic/GI:  Hepatic/GI Normal    Musculoskeletal:  Musculoskeletal Normal    Neurological:   Headaches    Endocrine:  Endocrine Normal    Dermatological:  Skin Normal    Psych:   Psychiatric History          Physical Exam  General:  Well nourished    Airway/Jaw/Neck:  Airway Findings: Mouth Opening: Normal Tongue: Normal  General Airway Assessment: Adult  Mallampati: II  TM Distance: Normal, at least 6 cm         Dental:  Dental Findings: In tact             Anesthesia Plan  Type of Anesthesia, risks & benefits discussed:  Anesthesia Type:  general  Patient's Preference:   Intra-op Monitoring Plan: standard ASA monitors  Intra-op Monitoring Plan Comments:   Post Op Pain Control Plan: per primary service following discharge from PACU  Post Op Pain Control Plan Comments:   Induction:   IV  Beta Blocker:         Informed Consent: Patient understands risks and agrees with Anesthesia plan.  Questions answered. Anesthesia consent signed with patient.  ASA Score: 2  emergent   Day of Surgery Review of History & Physical:    H&P update referred to the surgeon.     Anesthesia Plan Notes: Allergies list  Opioid analogues, rec'd fent last anesthetic, and states can take norco    Refuses scope patch, despite PONV without it last time        Ready  For Surgery From Anesthesia Perspective.

## 2020-01-06 LAB
COMPN STONE: NORMAL
SPECIMEN SOURCE: NORMAL
STONE ANALYSIS IR-IMP: NORMAL

## 2020-01-07 ENCOUNTER — PATIENT MESSAGE (OUTPATIENT)
Dept: INTERNAL MEDICINE | Facility: CLINIC | Age: 30
End: 2020-01-07

## 2020-01-07 DIAGNOSIS — N20.1 URETEROLITHIASIS: Primary | ICD-10-CM

## 2020-01-07 NOTE — PROGRESS NOTES
Dr Ybarra forwarded these results to the patient via shopp.  He will discuss the results with the patient in person at the next appointment.  The patient was instructed to make an appointment if she does not already have one scheduled.

## 2020-01-08 NOTE — TELEPHONE ENCOUNTER
Please inform patient that Dr. Quinn is out of the office until next week.  I signed a referral to a nutritionist.  They should contact her about setting up visit.    Thanks

## 2020-01-09 ENCOUNTER — TELEPHONE (OUTPATIENT)
Dept: INTERNAL MEDICINE | Facility: CLINIC | Age: 30
End: 2020-01-09

## 2020-01-09 NOTE — TELEPHONE ENCOUNTER
Left voice message for patient to schedule appointment from referral to Nutrition Services.  Mor HUERTA  [phone]

## 2020-01-20 ENCOUNTER — HOSPITAL ENCOUNTER (OUTPATIENT)
Dept: DIABETES | Facility: OTHER | Age: 30
Discharge: HOME | End: 2020-01-20
Attending: FAMILY MEDICINE
Payer: COMMERCIAL

## 2020-01-20 VITALS — WEIGHT: 126.13 LBS | BODY MASS INDEX: 22.35 KG/M2 | HEIGHT: 63 IN

## 2020-01-20 DIAGNOSIS — N13.5 OBSTRUCTION OF RIGHT URETER: Primary | ICD-10-CM

## 2020-01-20 PROCEDURE — 97802 MEDICAL NUTRITION INDIV IN: CPT | Performed by: DIETITIAN, REGISTERED

## 2020-01-22 NOTE — PROGRESS NOTES
"MEDICAL NUTRITION THERAPY  PROGRESS NOTE    2020  Referring Physician:  Kay Lowe, *  Reason for MNT Visit:  30 y.o. female is in clinic for evaluation of kidney stone prevention.    Vital Signs:  Ht 5' 3" (1.6 m)   Wt 57.2 kg (126 lb 1.7 oz)   BMI 22.34 kg/m²     Clinical Data:  IBW: 120 pounds (+/- 10%)  %IBW: 105 %  Body mass index is 22.34 kg/m².        Estimated Daily Energy Requirements:  1342-3419 calories and 43-54 of protein per day    Laboratory:  K: 3.6, B, GFR:>60, Mendez:9.5, Alb:4.6    Nutrition History:  Weight: 105% IBW and weight is stable  Meal patterns: 3 meals per day. Does not cook. Avoids milk bc lactose intolerant, drinking almond or nut milks. Able to drink Lactacid milk.   Beverages: water, kambucha, green smoothies  Dining out: Often, Chooses healthy options but food is high in sodium    Physical Activity:  None, very tired when home from work    Psychosocial:  Patient currently in active stage of change.     MNT Recommendations:  30 year old female with a recent hx of kidney stones leading to sepsis.  · Reduce sodium in diet  · Avoid food high in oxalates  · 2-3 glasses of milk per day  · Increase consumption of water    Plan:  · Decrease consumption of food from food trucks  · Change to lactaide milk vs almond milk  · 8 glasses of water per day    Provided patient with written materials and phone numbers for clinic. All questions and concerns were addressed.       Counseling Time:  60 minutes  "

## 2020-01-27 LAB
FINAL PATHOLOGIC DIAGNOSIS: NORMAL
GROSS: NORMAL

## 2020-01-27 NOTE — PROGRESS NOTES
Dr Ybarra forwarded these results to the patient via gogamingo.  He will discuss the results with the patient in person at the next appointment.  The patient was instructed to make an appointment if she does not already have one scheduled.

## 2020-02-10 LAB
GENETIC COUNSELING?: YES
GENSO SPECIMEN TYPE: NORMAL
MISCELLANEOUS GENETIC TEST NAME: NORMAL
PARTENTAL OR SIBLING TESTING?: YES
REFERENCE LAB: NORMAL
TEST RESULT: NORMAL

## 2020-02-12 ENCOUNTER — TELEPHONE (OUTPATIENT)
Dept: UROLOGY | Facility: CLINIC | Age: 30
End: 2020-02-12

## 2020-02-15 ENCOUNTER — HOSPITAL ENCOUNTER (OUTPATIENT)
Dept: RADIOLOGY | Facility: OTHER | Age: 30
Discharge: HOME | End: 2020-02-15
Attending: STUDENT IN AN ORGANIZED HEALTH CARE EDUCATION/TRAINING PROGRAM
Payer: COMMERCIAL

## 2020-02-15 DIAGNOSIS — N20.1 URETEROLITHIASIS: ICD-10-CM

## 2020-02-15 PROCEDURE — 76770 US EXAM ABDO BACK WALL COMP: CPT | Mod: TC

## 2020-02-15 PROCEDURE — 76770 US RETROPERITONEAL COMPLETE: ICD-10-PCS | Mod: 26,,, | Performed by: RADIOLOGY

## 2020-02-15 PROCEDURE — 76770 US EXAM ABDO BACK WALL COMP: CPT | Mod: 26,,, | Performed by: RADIOLOGY

## 2020-02-18 NOTE — PROGRESS NOTES
Subjective:      Delaney Arechiga is a 30 y.o. female who returns today regarding her nephrolithiasis.       The patient is s/p emergent cysto with ureteral stent placement on 12/10 with Dr. England for a 3 mm left ureteral stone. Urine culture was positive for E. Coli at the time. This was treated with macrobid. She is now 6 weeks s/p ureteroscopy with LL by Dr. Ybarra.     She is doing great! Denies bothersome urinary symptoms. Denies flank pain and N/V. Follow up YARON showed no stones, masses or hydronephrosis.    The following portions of the patient's history were reviewed and updated as appropriate: allergies, current medications, past family history, past medical history, past social history, past surgical history and problem list.    Review of Systems  Constitutional: no fever or chills  ENT: no nasal congestion or sore throat  Respiratory: no cough or shortness of breath  Cardiovascular: no chest pain or palpitations  Gastrointestinal: no nausea or vomiting, tolerating diet  Genitourinary: as per HPI  Hematologic/Lymphatic: no easy bruising or lymphadenopathy  Musculoskeletal: no arthralgias or myalgias  Neurological: no seizures or tremors  Behavioral/Psych: no auditory or visual hallucinations     Objective:   Vital Signs:  Vitals:    02/19/20 0725   BP: 127/83   Pulse: 95         Physical Exam   General: alert and oriented, no acute distress  Head: normocephalic, atraumatic  Neck: supple, no lymphadenopathy, normal ROM, no masses  Respiratory: Symmetric expansion, non-labored breathing  Cardiovascular: regular rate and rhythm, nomal pulses, no peripheral edema  Abdomen: soft, non tender, non distended, no palpable masses, no hernias, no hepatomegaly or splenomegaly  Pelvic: deferred  Lymphatic: no inguinal nodes  Skin: normal coloration and turgor, no rashes, no suspicious skin lesions noted  Neuro: alert and oriented x3, no gross deficits  Psych: normal judgment and insight, normal mood/affect and  non-anxious  No CVA tenderness    Lab Review   Urinalysis demonstrates: no specimen   Lab Results   Component Value Date    WBC 8.24 12/23/2019    HGB 14.2 12/23/2019    HCT 41.9 12/23/2019    MCV 88 12/23/2019     (H) 12/23/2019     Lab Results   Component Value Date    CREATININE 0.9 12/23/2019    BUN 11 12/23/2019       Imaging   (all images personally reviewed; agree with report below)  YARON 2/15/20- no stones, masses or hydro     Assessment:   Nephrolithiasis    Plan:   Diagnoses and all orders for this visit:    Nephrolithiasis  -     X-Ray KUB; Future    Ureterolithiasis    Plan:  --Doing great!  --No other stones visualized on CT  --Consider alternatives to spironolactone  --Recommend general preventive measures, to include increased hydration, low Na diet, and increased citrus intake  --Follow up in 6 months with KUB

## 2020-02-19 ENCOUNTER — TELEPHONE (OUTPATIENT)
Dept: UROLOGY | Facility: CLINIC | Age: 30
End: 2020-02-19

## 2020-02-19 ENCOUNTER — OFFICE VISIT (OUTPATIENT)
Dept: UROLOGY | Facility: CLINIC | Age: 30
End: 2020-02-19
Payer: COMMERCIAL

## 2020-02-19 VITALS — DIASTOLIC BLOOD PRESSURE: 83 MMHG | SYSTOLIC BLOOD PRESSURE: 127 MMHG | HEART RATE: 95 BPM

## 2020-02-19 DIAGNOSIS — N20.1 URETEROLITHIASIS: ICD-10-CM

## 2020-02-19 DIAGNOSIS — N20.0 NEPHROLITHIASIS: Primary | ICD-10-CM

## 2020-02-19 PROCEDURE — 99213 OFFICE O/P EST LOW 20 MIN: CPT | Mod: S$GLB,,, | Performed by: NURSE PRACTITIONER

## 2020-02-19 PROCEDURE — 99213 PR OFFICE/OUTPT VISIT, EST, LEVL III, 20-29 MIN: ICD-10-PCS | Mod: S$GLB,,, | Performed by: NURSE PRACTITIONER

## 2020-02-19 RX ORDER — MELOXICAM 15 MG/1
15 TABLET ORAL DAILY
COMMUNITY
End: 2020-03-05 | Stop reason: SDUPTHER

## 2020-02-19 NOTE — TELEPHONE ENCOUNTER
----- Message from Eleni Rivero NP sent at 2/19/2020  7:49 AM CST -----  Please schedule follow up with me in 6 months with MARIE joseph. Thanks!

## 2020-02-20 NOTE — DISCHARGE SUMMARY
[facility]  Hospital Medicine  Discharge Summary      Patient Name: Delaney Arechiga  MRN: [id number]  Admission Date: 12/10/2019  Hospital Length of Stay: 3 days  Discharge Date and Time: 12/13/2019 12:39 PM  Attending Physician: No att. providers found   Discharging Provider: Abeba West MD  Primary Care Provider: Ale Quinn MD      HPI:   29 year old female with past medical history of endometriosis, came in with right flank pain for 1-2 days getting worse.Pain radiates from flank area to lower right abdomen.Associated with subjective fever, chills, nausea and vomiting.Patient had dysuria symptoms few days ago for which she took over the counter meds with some relief but return of symptoms.C/o dark colored urine.Denies h/o kidney stones in past.Ct scan showedExtensive right perinephric and periureteral inflammation as well as moderate right hydroureteronephrosis secondary to a 0.3 cm calculus within the proximal left ureter.  Correlation with urinalysis is recommended to exclude superimposed infection given the degree of inflammatory changes.    Procedure(s) (LRB):  CYSTOSCOPY, WITH URETERAL STENT INSERTION (Right)      Hospital Course:   Patient was seen by urology and had right ureteral stent placement.Her blood and urine cx came back positive for gram negative rods, pan sensitive e coli.Repeat blood cx were negative.Wbc improved, and she was symptomatically better.Was discharged on oral abx and urology follow up.     Consults:   Consults (From admission, onward)        Status Ordering Provider     Inpatient consult to Urology  Once     Provider:  Bradley England MD    Completed SHAMIR TIMMONS          No new Assessment & Plan notes have been filed under this hospital service since the last note was generated.  Service: Hospital Medicine    Final Active Diagnoses:    Diagnosis Date Noted POA    PRINCIPAL PROBLEM:  Ureterolithiasis [N20.1] 12/10/2019 Yes    Obstruction of right  ureter [N13.5]  Yes    Pyelonephritis [N12]  Yes    Endometriosis [N80.9] 03/22/2019 Yes      Problems Resolved During this Admission:    Diagnosis Date Noted Date Resolved POA    Hypokalemia [E87.6] 12/12/2019 12/23/2019 No    Sepsis [A41.9] 12/11/2019 12/23/2019 Yes    Hypomagnesemia [E83.42] 12/11/2019 12/23/2019 Yes    Hypophosphatemia [E83.39] 12/11/2019 12/23/2019 Yes    Bacteremia [R78.81] 12/11/2019 12/23/2019 Yes       Discharged Condition: stable    Disposition: Home or Self Care    Follow Up:  Follow-up Information     Bradley England MD In 2 weeks.    Specialty:  Urology  Contact information:  [address]  [facility] [address]  [phone]                 Patient Instructions:      Diet Adult Regular     Activity as tolerated       Significant Diagnostic Studies:  CT RENAL STONE STUDY ABD PELVIS WO 12/10/19    CLINICAL HISTORY:  Flank pain, stone disease suspected;Hematuria;    TECHNIQUE:  Low dose axial images, sagittal and coronal reformations were obtained from the lung bases to the pubic symphysis.  Contrast was not administered.    COMPARISON:  None    FINDINGS:  Images of the lower thorax are remarkable for minimal dependent atelectasis.    The liver is enlarged, correlation with LFTs recommended.  The spleen is mildly prominent.  The pancreas, gallbladder and adrenal glands have a grossly unremarkable noncontrast appearance.  There is no biliary dilation or ascites.  No significant abdominal lymphadenopathy.    The left kidney has a grossly unremarkable noncontrast appearance without hydronephrosis or nephrolithiasis.  The left ureter is grossly unremarkable along its visualized course, no definite left ureteral calculi seen.  There is extensive right perinephric and periureteral inflammation.  There is moderate right hydroureteronephrosis, secondary to a 0.3 cm calculus within the proximal right ureter.  No additional right ureteral calculi seen.  The urinary bladder is  unremarkable.  There is an IUD.  The adnexa is unremarkable.  There is trace fluid in the pelvis.    There are a few scattered colonic diverticula without inflammation.  The terminal ileum is unremarkable.  The appendix is not confidently identified, no pericecal inflammation.  The small bowel is grossly unremarkable.  Scattered shotty periaortic and paracaval lymph nodes are noted.  No focal organized pelvic fluid collection.    Degenerative changes are noted of the spine.      Impression       This report was flagged in Epic as abnormal.    1. Extensive right perinephric and periureteral inflammation as well as moderate right hydroureteronephrosis secondary to a 0.3 cm calculus within the proximal left ureter.  Correlation with urinalysis is recommended to exclude superimposed infection given the degree of inflammatory changes.  2. Mild hepatosplenomegaly, correlation with LFTs recommended.  3. Additional findings above.         Pending Diagnostic Studies:     None         Medications:  Reconciled Home Medications:      Medication List      CONTINUE taking these medications    buPROPion 150 MG TB24 tablet  Commonly known as:  WELLBUTRIN XL  Take 1 tablet (150 mg total) by mouth once daily.     levonorgestrel 20 mcg/24 hours (5 yrs) 52 mg IUD  Commonly known as:  MIRENA  1 each by Intrauterine route once.     loratadine 10 mg tablet  Commonly known as:  CLARITIN  Take 10 mg by mouth once daily.     norethindrone 5 mg Tab  Commonly known as:  AYGESTIN  Take 1 tablet (5 mg total) by mouth once daily.     spironolactone 100 MG tablet  Commonly known as:  ALDACTONE  Take 1 tablet (100 mg total) by mouth every evening. Start with 1 po qday, increase to 2 po qday as tolerated     Ciprofloxacin 500 mg po bid     Flomax 0.4 mg oral daily     Pyridium po tid prn dysuria    Oxybutynin tid prn spasms     STOP taking these medications    meloxicam 15 MG tablet  Commonly known as:  MOBIC            Indwelling Lines/Drains at  time of discharge:   Lines/Drains/Airways     Drain                 Ureteral Drain/Stent 12/10/19 1809 Right ureter 6 Fr. 71 days                Time spent on the discharge of patient: 35 minutes  Patient was seen and examined on the date of discharge and determined to be suitable for discharge.         Abeba West MD  Department of Hospital Medicine  [facility]

## 2020-03-05 ENCOUNTER — PATIENT MESSAGE (OUTPATIENT)
Dept: OBSTETRICS AND GYNECOLOGY | Facility: CLINIC | Age: 30
End: 2020-03-05

## 2020-03-05 RX ORDER — MELOXICAM 15 MG/1
15 TABLET ORAL DAILY
Qty: 30 TABLET | Refills: 3 | Status: SHIPPED | OUTPATIENT
Start: 2020-03-05 | End: 2020-12-31

## 2020-04-03 ENCOUNTER — PATIENT MESSAGE (OUTPATIENT)
Dept: OBSTETRICS AND GYNECOLOGY | Facility: CLINIC | Age: 30
End: 2020-04-03

## 2020-04-03 DIAGNOSIS — N80.9 ENDOMETRIOSIS: ICD-10-CM

## 2020-04-03 RX ORDER — NORETHINDRONE 5 MG/1
5 TABLET ORAL DAILY
Qty: 30 TABLET | Refills: 1 | Status: SHIPPED | OUTPATIENT
Start: 2020-04-03 | End: 2020-12-31

## 2020-04-07 DIAGNOSIS — F33.1 MODERATE EPISODE OF RECURRENT MAJOR DEPRESSIVE DISORDER: ICD-10-CM

## 2020-04-07 DIAGNOSIS — L70.0 ACNE VULGARIS: ICD-10-CM

## 2020-04-07 RX ORDER — SPIRONOLACTONE 100 MG/1
100 TABLET, FILM COATED ORAL NIGHTLY
Qty: 90 TABLET | Refills: 3 | Status: CANCELLED | OUTPATIENT
Start: 2020-04-07

## 2020-04-07 RX ORDER — BUPROPION HYDROCHLORIDE 150 MG/1
150 TABLET ORAL DAILY
Qty: 90 TABLET | Refills: 3 | Status: SHIPPED | OUTPATIENT
Start: 2020-04-07 | End: 2020-10-12 | Stop reason: SDUPTHER

## 2020-04-08 RX ORDER — SPIRONOLACTONE 100 MG/1
100 TABLET, FILM COATED ORAL NIGHTLY
Qty: 90 TABLET | Refills: 3 | OUTPATIENT
Start: 2020-04-08

## 2020-04-13 DIAGNOSIS — L70.0 ACNE VULGARIS: ICD-10-CM

## 2020-04-13 RX ORDER — SPIRONOLACTONE 100 MG/1
100 TABLET, FILM COATED ORAL NIGHTLY
Qty: 90 TABLET | Refills: 0 | Status: SHIPPED | OUTPATIENT
Start: 2020-04-13 | End: 2020-04-14 | Stop reason: SDUPTHER

## 2020-04-14 ENCOUNTER — PATIENT MESSAGE (OUTPATIENT)
Dept: PRIMARY CARE CLINIC | Facility: CLINIC | Age: 30
End: 2020-04-14

## 2020-04-14 RX ORDER — SPIRONOLACTONE 100 MG/1
200 TABLET, FILM COATED ORAL NIGHTLY
Qty: 180 TABLET | Refills: 0 | Status: SHIPPED | OUTPATIENT
Start: 2020-04-14 | End: 2020-07-06

## 2020-06-15 ENCOUNTER — OFFICE VISIT (OUTPATIENT)
Dept: OBSTETRICS AND GYNECOLOGY | Facility: CLINIC | Age: 30
End: 2020-06-15
Payer: COMMERCIAL

## 2020-06-15 VITALS
SYSTOLIC BLOOD PRESSURE: 120 MMHG | BODY MASS INDEX: 22.89 KG/M2 | HEIGHT: 63 IN | WEIGHT: 129.19 LBS | DIASTOLIC BLOOD PRESSURE: 80 MMHG

## 2020-06-15 DIAGNOSIS — R10.2 FEMALE PELVIC PAIN: Primary | ICD-10-CM

## 2020-06-15 PROCEDURE — 99214 OFFICE O/P EST MOD 30 MIN: CPT | Mod: S$GLB,,, | Performed by: OBSTETRICS & GYNECOLOGY

## 2020-06-15 PROCEDURE — 3008F PR BODY MASS INDEX (BMI) DOCUMENTED: ICD-10-PCS | Mod: CPTII,S$GLB,, | Performed by: OBSTETRICS & GYNECOLOGY

## 2020-06-15 PROCEDURE — 99999 PR PBB SHADOW E&M-EST. PATIENT-LVL III: CPT | Mod: PBBFAC,,, | Performed by: OBSTETRICS & GYNECOLOGY

## 2020-06-15 PROCEDURE — 99214 PR OFFICE/OUTPT VISIT, EST, LEVL IV, 30-39 MIN: ICD-10-PCS | Mod: S$GLB,,, | Performed by: OBSTETRICS & GYNECOLOGY

## 2020-06-15 PROCEDURE — 3008F BODY MASS INDEX DOCD: CPT | Mod: CPTII,S$GLB,, | Performed by: OBSTETRICS & GYNECOLOGY

## 2020-06-15 PROCEDURE — 99999 PR PBB SHADOW E&M-EST. PATIENT-LVL III: ICD-10-PCS | Mod: PBBFAC,,, | Performed by: OBSTETRICS & GYNECOLOGY

## 2020-06-15 RX ORDER — GABAPENTIN 100 MG/1
100 CAPSULE ORAL NIGHTLY
Qty: 30 CAPSULE | Refills: 2 | Status: SHIPPED | OUTPATIENT
Start: 2020-06-15 | End: 2020-12-31

## 2020-06-15 NOTE — PROGRESS NOTES
Subjective:       Patient ID: Delaney Arechiga is a 30 y.o. female.    Chief Complaint:  Pelvic Pain      History of Present Illness  HPI  Pt is 30 y.o. known to me with hx of endometriosis who was doing well until recently.  Started to have recurrence of pelvic pain.    No clear trigger of pain.  Has been compliant with endo medications.    GYN & OB History  No LMP recorded (lmp unknown).   Date of Last Pap: 4/3/2019    OB History    Para Term  AB Living   0 0 0 0 0 0   SAB TAB Ectopic Multiple Live Births   0 0 0 0 0       Review of Systems  Review of Systems   Constitutional: Negative for activity change, appetite change and fatigue.   HENT: Negative.  Negative for tinnitus.    Eyes: Negative.    Respiratory: Negative for cough and shortness of breath.    Cardiovascular: Negative for chest pain and palpitations.   Gastrointestinal: Negative.  Negative for abdominal pain, blood in stool, constipation, diarrhea and nausea.   Endocrine: Negative.  Negative for hot flashes.   Genitourinary: Negative for dysmenorrhea, dyspareunia, dysuria, frequency, menstrual problem, pelvic pain, vaginal discharge, urinary incontinence and postcoital bleeding.   Musculoskeletal: Negative for back pain and joint swelling.   Integumentary:  Negative for rash, breast mass, nipple discharge and breast skin changes.   Neurological: Negative.  Negative for headaches.   Hematological: Negative.  Does not bruise/bleed easily.   Psychiatric/Behavioral: The patient is not nervous/anxious.    Breast: negative.  Negative for mass, nipple discharge and skin changes          Objective:    Physical Exam:   Constitutional: She is oriented to person, place, and time. She appears well-developed and well-nourished. No distress.    HENT:   Head: Normocephalic and atraumatic.    Eyes: Pupils are equal, round, and reactive to light. Conjunctivae and lids are normal.    Neck: Normal range of motion and full passive range of motion  without pain. Neck supple.    Cardiovascular: Normal rate and regular rhythm.  Exam reveals no edema.     Pulmonary/Chest: Effort normal and breath sounds normal. She has no wheezes.        Abdominal: Soft. Normal appearance and bowel sounds are normal. She exhibits no distension. There is no abdominal tenderness. There is no rebound and no guarding.     Genitourinary:    Vagina and uterus normal.      Pelvic exam was performed with patient supine.   There is no tenderness or lesion on the right labia. There is no tenderness or lesion on the left labia. Uterus is not deviated, not fixed and not hosting fibroids. Cervix is normal. Right adnexum displays no mass and no tenderness. Left adnexum displays no mass and no tenderness. No rectocele, cystocele or unspecified prolapse of vaginal walls in the vagina. Labial bartholins normal.Cervix exhibits no motion tenderness and no discharge.    Genitourinary Comments: Pain on R>L             Musculoskeletal: Normal range of motion and moves all extremeties.       Neurological: She is alert and oriented to person, place, and time. She has normal strength.    Skin: Skin is warm and dry.    Psychiatric: She has a normal mood and affect. Her speech is normal and behavior is normal. Thought content normal. Her mood appears not anxious. She does not exhibit a depressed mood. She expresses no suicidal plans and no homicidal plans.          Assessment:        1. Female pelvic pain                Plan:      Delaney was seen today for pelvic pain.    Diagnoses and all orders for this visit:    Female pelvic pain  -     gabapentin (NEURONTIN) 100 MG capsule; Take 1 capsule (100 mg total) by mouth every evening.  We had a long discussion about different tx options.  Will try neurontin at night to see if that helps with neuropathic pain.  If not, we can then try pelvic floor PT.  Pt asking about complete hysterectomy and discussed how we cannot be confident that removal of both ovaries  would cure her pain -- and that would then carry the additional risks of oopherectomy at a young age (early dementia, osteoporosis, menopausal sx).

## 2020-06-24 PROBLEM — N20.1 URETEROLITHIASIS: Status: RESOLVED | Noted: 2019-12-10 | Resolved: 2020-06-24

## 2020-06-24 PROBLEM — D64.9 NORMOCYTIC ANEMIA: Status: RESOLVED | Noted: 2019-12-23 | Resolved: 2020-06-24

## 2020-06-24 PROBLEM — R35.0 URINARY FREQUENCY: Status: RESOLVED | Noted: 2020-01-02 | Resolved: 2020-06-24

## 2020-06-25 ENCOUNTER — PATIENT MESSAGE (OUTPATIENT)
Dept: OBSTETRICS AND GYNECOLOGY | Facility: CLINIC | Age: 30
End: 2020-06-25

## 2020-06-25 DIAGNOSIS — N80.9 ENDOMETRIOSIS: Primary | ICD-10-CM

## 2020-06-25 DIAGNOSIS — R10.2 FEMALE PELVIC PAIN: ICD-10-CM

## 2020-08-22 ENCOUNTER — HOSPITAL ENCOUNTER (OUTPATIENT)
Dept: RADIOLOGY | Facility: OTHER | Age: 30
Discharge: HOME | End: 2020-08-22
Attending: NURSE PRACTITIONER
Payer: COMMERCIAL

## 2020-08-22 DIAGNOSIS — N20.0 NEPHROLITHIASIS: ICD-10-CM

## 2020-08-22 PROCEDURE — 74018 XR KUB: ICD-10-PCS | Mod: 26,,, | Performed by: RADIOLOGY

## 2020-08-22 PROCEDURE — 74018 RADEX ABDOMEN 1 VIEW: CPT | Mod: TC,FY

## 2020-08-22 PROCEDURE — 74018 RADEX ABDOMEN 1 VIEW: CPT | Mod: 26,,, | Performed by: RADIOLOGY

## 2020-09-10 ENCOUNTER — PATIENT OUTREACH (OUTPATIENT)
Dept: ADMINISTRATIVE | Facility: OTHER | Age: 30
End: 2020-09-10

## 2020-09-15 ENCOUNTER — OFFICE VISIT (OUTPATIENT)
Dept: OBSTETRICS AND GYNECOLOGY | Facility: CLINIC | Age: 30
End: 2020-09-15
Attending: OBSTETRICS & GYNECOLOGY
Payer: COMMERCIAL

## 2020-09-15 VITALS
DIASTOLIC BLOOD PRESSURE: 72 MMHG | HEIGHT: 63 IN | BODY MASS INDEX: 24.45 KG/M2 | WEIGHT: 138 LBS | SYSTOLIC BLOOD PRESSURE: 108 MMHG

## 2020-09-15 DIAGNOSIS — Z12.4 PAP SMEAR FOR CERVICAL CANCER SCREENING: ICD-10-CM

## 2020-09-15 DIAGNOSIS — Z01.419 ENCOUNTER FOR GYNECOLOGICAL EXAMINATION WITHOUT ABNORMAL FINDING: Primary | ICD-10-CM

## 2020-09-15 DIAGNOSIS — Z11.3 VENEREAL DISEASE SCREENING: ICD-10-CM

## 2020-09-15 PROCEDURE — 99999 PR PBB SHADOW E&M-EST. PATIENT-LVL III: CPT | Mod: PBBFAC,,, | Performed by: OBSTETRICS & GYNECOLOGY

## 2020-09-15 PROCEDURE — 88175 CYTOPATH C/V AUTO FLUID REDO: CPT

## 2020-09-15 PROCEDURE — 99395 PREV VISIT EST AGE 18-39: CPT | Mod: S$GLB,,, | Performed by: OBSTETRICS & GYNECOLOGY

## 2020-09-15 PROCEDURE — 87491 CHLMYD TRACH DNA AMP PROBE: CPT

## 2020-09-15 PROCEDURE — 99999 PR PBB SHADOW E&M-EST. PATIENT-LVL III: ICD-10-PCS | Mod: PBBFAC,,, | Performed by: OBSTETRICS & GYNECOLOGY

## 2020-09-15 PROCEDURE — 99395 PR PREVENTIVE VISIT,EST,18-39: ICD-10-PCS | Mod: S$GLB,,, | Performed by: OBSTETRICS & GYNECOLOGY

## 2020-09-15 PROCEDURE — 87624 HPV HI-RISK TYP POOLED RSLT: CPT

## 2020-09-15 NOTE — PROGRESS NOTES
Subjective:       Patient ID: Delaney Arechiga is a 30 y.o. female.    Chief Complaint:  Annual Exam      History of Present Illness  HPI    Delaney Arechiga is a 30 y.o. female  here for her annual GYN exam.    She describes her periods as stopped with use of Aygestin about 4 months ago, previously very light WITH HER MIRENA IUD PLACEMENT. . Had Mirena placed in , with some improvement of her dysmenorrhea/pelvic pain, then improvement following Laparoscopic excision of endometrial implants in May 2019. Took Neurontin briefly THIS YEAR WHEN SHE HAD RECURRENCE OF SOME PAIN, but was too sedated to work. Began Orilissa (Elogalix) in  and has had marked improvement in her pain. Has only had cramps once or twice since beginning the Orilissa.  denies break through bleeding.   denies vaginal itching or irritation.  Denies aginal discharge.  She is sexually active. She has had only one partner in the past year (long distance relationship)  She uses IUD for contraception.   History of abnormal pap: Yes - HPV  Last Pap: approximate date 2019  and was abnormal: Normal cells, + HR HPV  denies domestic violence. She does feel safe at home.     Past Medical History:   Diagnosis Date    Abnormal Pap smear of cervix     Family history of breast cancer     Heart murmur 3/25/2019    Migraines     PONV (postoperative nausea and vomiting)     S/P diagnostic laparoscopy, excision of endometriosis 19    Urticaria      Past Surgical History:   Procedure Laterality Date    BUNIONECTOMY Right     COLPOSCOPY      CYSTOSCOPY W/ URETERAL STENT PLACEMENT Right 12/10/2019    Procedure: CYSTOSCOPY, WITH URETERAL STENT INSERTION;  Surgeon: Bradley England MD;  Location: [facility];  Service: Urology;  Laterality: Right;    DIAGNOSTIC LAPAROSCOPY N/A 2019    Procedure: LAPAROSCOPY, DIAGNOSTIC;  Surgeon: Jeet Thompson MD;  Location: [facility];  Service: OB/GYN;  Laterality: N/A;   Fulgeration endometriosis    SURGICAL REMOVAL OF ENDOMETRIOMA  2019    Procedure: EXCISION, ENDOMETRIOMA;  Surgeon: Jeet Thompson MD;  Location: [facility];  Service: OB/GYN;;    TONSILLECTOMY      URETEROSCOPIC REMOVAL OF URETERIC CALCULUS Right 2020    Procedure: REMOVAL, CALCULUS, URETER, URETEROSCOPIC;  Surgeon: Eddi Ybarra MD;  Location: [facility];  Service: Urology;  Laterality: Right;     Social History     Socioeconomic History    Marital status: Single     Spouse name: Not on file    Number of children: Not on file    Years of education: Not on file    Highest education level: Not on file   Occupational History    Not on file   Social Needs    Financial resource strain: Very hard    Food insecurity     Worry: Never true     Inability: Never true    Transportation needs     Medical: No     Non-medical: No   Tobacco Use    Smoking status: Former Smoker     Types: Cigarettes     Quit date: 2018     Years since quittin.7    Smokeless tobacco: Never Used   Substance and Sexual Activity    Alcohol use: Not Currently     Frequency: Never     Drinks per session: Patient refused     Binge frequency: Never    Drug use: Never    Sexual activity: Not Currently     Partners: Male     Birth control/protection: I.U.D.     Comment: Mirena placed 2017   Lifestyle    Physical activity     Days per week: 1 day     Minutes per session: 30 min    Stress: Rather much   Relationships    Social connections     Talks on phone: Twice a week     Gets together: More than three times a week     Attends [employer] service: Not on file     Active member of club or organization: Yes     Attends meetings of clubs or organizations: More than 4 times per year     Relationship status: Never    Other Topics Concern    Are you pregnant or think you may be? Not Asked    Breast-feeding Not Asked   Social History Narrative    Not on file     Family History   Problem Relation Age of Onset     "Migraines Mother     Stroke Mother 67    Hypertension Mother     Migraines Father     Aortic aneurysm Father     Peripheral vascular disease Father         s/p leg stent    Prostate cancer Father     Migraines Sister     Breast cancer Maternal Grandmother 80    Heart failure Maternal Grandfather     Ovarian cancer Paternal Grandmother     Breast cancer Maternal Aunt 35    Breast cancer Maternal Aunt 40    Colon cancer Neg Hx     Diabetes Neg Hx     Melanoma Neg Hx      OB History        0    Para   0    Term   0       0    AB   0    Living   0       SAB   0    TAB   0    Ectopic   0    Multiple   0    Live Births   0                 /72   Ht 5' 3" (1.6 m)   Wt 62.6 kg (138 lb 0.1 oz)   LMP 05/15/2019 (Approximate)   BMI 24.45 kg/m²         GYN & OB History  Patient's last menstrual period was 05/15/2019 (approximate).   Date of Last Pap: 4/3/2019    OB History    Para Term  AB Living   0 0 0 0 0 0   SAB TAB Ectopic Multiple Live Births   0 0 0 0 0       Review of Systems  Review of Systems   Constitutional: Negative for activity change, appetite change, fatigue and unexpected weight change.   HENT: Negative.    Eyes: Negative for visual disturbance.   Respiratory: Negative for shortness of breath and wheezing.    Cardiovascular: Negative for chest pain, palpitations and leg swelling.   Gastrointestinal: Negative for abdominal pain, bloating and blood in stool.   Endocrine: Negative for diabetes and hair loss.   Genitourinary: Positive for dysmenorrhea and vaginal dryness. Negative for decreased libido, dyspareunia, menorrhagia and menstrual problem.   Musculoskeletal: Negative for back pain and joint swelling.   Integumentary:  Negative for acne, hair changes and nipple discharge.   Neurological: Negative for headaches.   Hematological: Does not bruise/bleed easily.   Psychiatric/Behavioral: Positive for depression. Negative for sleep disturbance. The patient is " not nervous/anxious.    Breast: Negative for mastodynia and nipple discharge          Objective:      Physical Exam:   Constitutional: She is oriented to person, place, and time. She appears well-developed and well-nourished.    HENT:   Head: Normocephalic and atraumatic.    Eyes: Pupils are equal, round, and reactive to light. EOM are normal.    Neck: Normal range of motion. Neck supple.    Cardiovascular: Normal rate and regular rhythm.     Pulmonary/Chest: Effort normal and breath sounds normal.   BREASTS:  no mass, no tenderness, no deformity and no retraction. Right breast exhibits no inverted nipple, no mass, no nipple discharge, no skin change, no tenderness, no bleeding and no swelling. Left breast exhibits no inverted nipple, no mass, no nipple discharge, no skin change, no tenderness, no bleeding and no swelling. Breasts are symmetrical.              Abdominal: Soft. Bowel sounds are normal.     Genitourinary:    Pelvic exam was performed with patient supine.      Genitourinary Comments: PELVIC: Normal external genitalia without lesions.  Normal hair distribution.  Adequate perineal body, normal urethral meatus.  Vagina moist and well rugated without lesions or discharge.  Cervix pink, without lesions, discharge or tenderness. IUD STRINGS IN PLACE. NO CULDESAC TENDERNESS.  NO NODULARITY NOTED.  No significant cystocele or rectocele.  Bimanual exam shows uterus to be normal size, regular, mobile and nontender.  Adnexa without masses or tenderness.                 Musculoskeletal: Normal range of motion and moves all extremeties.       Neurological: She is alert and oriented to person, place, and time.    Skin: Skin is warm and dry.    Psychiatric: She has a normal mood and affect.              Assessment:        1. Encounter for gynecological examination without abnormal finding    2. Pap smear for cervical cancer screening    3. Venereal disease screening                Plan:        1. Encounter for  gynecological examination without abnormal finding  COUNSELING:  The patient was counseled today on regular weight bearing exercise. Patient was counseled today on the new ACS guidelines for cervical cytology screening as well as the current recommendations for breast cancer screening. Counseling session lasted approximately 10 minutes, and all her questions were answered. She was advised to see her primary care physician for all other health maintenance.   FOLLOW-UP with me for next routine visit.         2. Pap smear for cervical cancer screening      - Liquid-Based Pap Smear, Screening  - HPV High Risk Genotypes, PCR    3. Venereal disease screening      - C. trachomatis/N. gonorrhoeae by AMP DNA       Follow up in about 1 year (around 9/15/2021).

## 2020-09-21 LAB
HPV HR 12 DNA SPEC QL NAA+PROBE: NEGATIVE
HPV16 AG SPEC QL: NEGATIVE
HPV18 DNA SPEC QL NAA+PROBE: NEGATIVE

## 2020-09-25 ENCOUNTER — OFFICE VISIT (OUTPATIENT)
Dept: DERMATOLOGY | Facility: CLINIC | Age: 30
End: 2020-09-25
Payer: COMMERCIAL

## 2020-09-25 DIAGNOSIS — Z79.899 ENCOUNTER FOR LONG-TERM (CURRENT) USE OF HIGH-RISK MEDICATION: Primary | ICD-10-CM

## 2020-09-25 DIAGNOSIS — L70.0 ACNE VULGARIS: ICD-10-CM

## 2020-09-25 PROCEDURE — 99999 PR PBB SHADOW E&M-EST. PATIENT-LVL III: CPT | Mod: PBBFAC,,, | Performed by: PHYSICIAN ASSISTANT

## 2020-09-25 PROCEDURE — 99214 PR OFFICE/OUTPT VISIT, EST, LEVL IV, 30-39 MIN: ICD-10-PCS | Mod: S$GLB,,, | Performed by: PHYSICIAN ASSISTANT

## 2020-09-25 PROCEDURE — 99214 OFFICE O/P EST MOD 30 MIN: CPT | Mod: S$GLB,,, | Performed by: PHYSICIAN ASSISTANT

## 2020-09-25 PROCEDURE — 99999 PR PBB SHADOW E&M-EST. PATIENT-LVL III: ICD-10-PCS | Mod: PBBFAC,,, | Performed by: PHYSICIAN ASSISTANT

## 2020-09-25 NOTE — PATIENT INSTRUCTIONS
ACCUTANE COUNSELING      · If you are a female, you are obligated  to use 2 forms of birth control. Do NOT CHANGE birth control methods during your course of treatment without contacting our office first. Do NOT change your form of birth control on the ipledge site.   · You are not allowed to donate blood while taking Accutane and for 1 month after  · If you experience severe continuous headaches with nausea/vomiting and/or light sensitivity please go to the emergency room.  · Elective surgeries including dental procedures are not recommended while on therapy.  · Tetracycline antibiotics (including minocycline and doxycycline) should not be taken while on Accutane.  · It is recommended that you wait to have any laser surgery until 6months after completion of your Accutane course.  · Let any physician participating in your health care know that you are on Accutane.  · Do not share your medicine with anyone  · You may experience increase skin sensitivity if getting any hair bearing areas waxed.  · A low fat, low cholesterol diet and minimal alcohol intake are recommended whole on Accutane.  · You will experience increase sun sensitivity while on Accutane-sun protection including a hat, sunscreen (Elta MD or Neutrogena ultra sheer dry touch [id number], and protective clothing are encouraged.  · There is increase susceptibility to bacterial (such as Staph) and viral infections (such as herpes simplex) while on this medication.  · Do not use any other oral or topical acne medications unless cleared by your doctor.  · Take the medicine with food for best results.  · Common side effects include:  · Dry lips- do not use medicated lip balms such as blistex or carmex. Vaseline petroleum is best  · Dry skin-Luke warm baths, mild soap such as Cetaphil, moisturizing creams such as Cerave, Aveeno or Cetaphil                                                                          If you have any questions or concerns, please  contact your healthcare provider    [facility] [place] [address]/ [phone] FAX/ [email or web address]

## 2020-09-25 NOTE — PROGRESS NOTES
Subjective:       Patient ID:  Delaney Arechiga is a 30 y.o. female who presents for   Chief Complaint   Patient presents with    Acne     t area & neck, x yrs, breakout, painful, tx spirononlactone & differin OTC     Acne - Follow-up  Symptom Course: last seen 4/5/19 - face improving but neck is worsening.  Currently using: spironolactone 100mg qd, otc differin qhs.  Affected locations: neck and forehead  Signs / symptoms: pain (breakouts)      Review of Systems   HENT: Positive for headaches (occ migraines). Negative for nosebleeds.    Gastrointestinal: Positive for Sensitivity to oral antibiotics (clinda, sulfa, and doxy). Negative for diarrhea.   Genitourinary: Negative for irregular periods (has mirena).   Musculoskeletal: Positive for arthralgias.   Skin: Positive for activity-related sunscreen use. Negative for daily sunscreen use and recent sunburn.   Neurological: Positive for headaches (occ migraines).   Psychiatric/Behavioral: Negative for depressed mood (hx of - controlled on wellbutrin; seeing therapist twice monthly).        Objective:    Physical Exam   Constitutional: She appears well-developed and well-nourished. No distress.   Neurological: She is alert and oriented to person, place, and time. She is not disoriented.   Psychiatric: She has a normal mood and affect.   Skin:   Areas Examined (abnormalities noted in diagram):   Head / Face Inspection Performed  Neck Inspection Performed  Chest / Axilla Inspection Performed  Back Inspection Performed  RUE Inspected  LUE Inspection Performed                   Diagram Legend     Erythematous scaling macule/papule c/w actinic keratosis       Vascular papule c/w angioma      Pigmented verrucoid papule/plaque c/w seborrheic keratosis      Yellow umbilicated papule c/w sebaceous hyperplasia      Irregularly shaped tan macule c/w lentigo     1-2 mm smooth white papules consistent with Milia      Movable subcutaneous cyst with punctum c/w epidermal  inclusion cyst      Subcutaneous movable cyst c/w pilar cyst      Firm pink to brown papule c/w dermatofibroma      Pedunculated fleshy papule(s) c/w skin tag(s)      Evenly pigmented macule c/w junctional nevus     Mildly variegated pigmented, slightly irregular-bordered macule c/w mildly atypical nevus      Flesh colored to evenly pigmented papule c/w intradermal nevus       Pink pearly papule/plaque c/w basal cell carcinoma      Erythematous hyperkeratotic cursted plaque c/w SCC      Surgical scar with no sign of skin cancer recurrence      Open and closed comedones      Inflammatory papules and pustules      Verrucoid papule consistent consistent with wart     Erythematous eczematous patches and plaques     Dystrophic onycholytic nail with subungual debris c/w onychomycosis     Umbilicated papule    Erythematous-base heme-crusted tan verrucoid plaque consistent with inflamed seborrheic keratosis     Erythematous Silvery Scaling Plaque c/w Psoriasis     See annotation      Assessment / Plan:        Encounter for long-term (current) use of high-risk medication  -     hCG, quantitative; Future; Expected date: 09/25/2020  -     Hepatic function panel; Future; Expected date: 09/25/2020  -     Lipid Panel; Future; Expected date: 09/25/2020    Acne vulgaris  Today's Plan:    Weight: 63 kg  Goal cumulative dosage (150-220 mg/kg): 9450 - 13,860 mg    Discussed risks and benefits of Isotretinoin including but not limited to dry eyes, dry skin, and dry lips; headaches; nosebleeds; muscle aches; joint aches; elevated liver functions; elevated cholesterol or triglycerides; depression; diarrhea/stomach cramping (inflammatory bowel disease); increased sun sensitivity; birth defects. No laser while on Isotretinoin or for one month after completion of Isotretinoin course. No waxing and no donating blood while on Isotretinoin or for one month after completion of Isotretinoin course.  Patient to continue birth control (Mirena).  Patient counseled extensively regarding need for two forms of birth control while on Isotretinoin and for 1 month prior to and 1 month following treatment course.   Will get consents signed and enter patient into Ipledge program.  Will check baseline lipid panel, liver function tests and pregnancy test.   If labs normal, will start Isotretinoin at 60 mg po daily.   Also pending approval from therapist. Pt will contact her to fax approval letter.  Brochures reviewed and provided.      Follow up for 1 month for UPT only, 2 months for office visit.

## 2020-09-25 NOTE — ASSESSMENT & PLAN NOTE
Today's Plan:    Weight: 63 kg  Goal cumulative dosage (150-220 mg/kg): 9450 - 13,860 mg    Discussed risks and benefits of Isotretinoin including but not limited to dry eyes, dry skin, and dry lips; headaches; nosebleeds; muscle aches; joint aches; elevated liver functions; elevated cholesterol or triglycerides; depression; diarrhea/stomach cramping (inflammatory bowel disease); increased sun sensitivity; birth defects. No laser while on Isotretinoin or for one month after completion of Isotretinoin course. No waxing and no donating blood while on Isotretinoin or for one month after completion of Isotretinoin course.  Patient to continue birth control (Mirena). Patient counseled extensively regarding need for two forms of birth control while on Isotretinoin and for 1 month prior to and 1 month following treatment course.   Will get consents signed and enter patient into Ipledge program.  Will check baseline lipid panel, liver function tests and pregnancy test.   If labs normal, will start Isotretinoin at 60 mg po daily.   Also pending approval from therapist. Pt will contact her to fax approval letter.  Brochures reviewed and provided.

## 2020-09-30 LAB
FINAL PATHOLOGIC DIAGNOSIS: NORMAL
Lab: NORMAL

## 2020-10-12 DIAGNOSIS — F33.1 MODERATE EPISODE OF RECURRENT MAJOR DEPRESSIVE DISORDER: ICD-10-CM

## 2020-10-13 ENCOUNTER — PATIENT MESSAGE (OUTPATIENT)
Dept: INTERNAL MEDICINE | Facility: CLINIC | Age: 30
End: 2020-10-13

## 2020-10-14 RX ORDER — BUPROPION HYDROCHLORIDE 150 MG/1
150 TABLET ORAL DAILY
Qty: 90 TABLET | Refills: 3 | Status: SHIPPED | OUTPATIENT
Start: 2020-10-14 | End: 2021-11-11 | Stop reason: SDUPTHER

## 2020-10-26 ENCOUNTER — TELEPHONE (OUTPATIENT)
Dept: DERMATOLOGY | Facility: CLINIC | Age: 30
End: 2020-10-26

## 2020-10-26 DIAGNOSIS — Z79.899 ENCOUNTER FOR LONG-TERM (CURRENT) USE OF HIGH-RISK MEDICATION: Primary | ICD-10-CM

## 2020-10-26 NOTE — TELEPHONE ENCOUNTER
Spoke with pt and informed her I spoke with the lab department and they stated pt will have to have another upt completed due to Specimen container was damaged when received and specimen was leaking. Pt upset and stated she doesn't have time to take another upt due to she took off from work to have first upt taken. Apologized to pt and asked her if she can have upt completed before or after work? Pt stated she can go before 8AM. Informed pt Jagruti put the order in and she can go whenever. Pt verbalized understanding.      RD

## 2020-10-26 NOTE — TELEPHONE ENCOUNTER
Spoke with Mandi in lab department. She stated pt will have to take another urine pregnancy test due to specimen container was damaged when received.        RD              ----- Message from Jagruti Mccabe PA-C sent at 10/26/2020  7:51 AM CDT -----  Regarding: FW: Lab Client Services  Contact: [phone]  I saw this after I sent the other message pertaining to this pt. Please call the lab and let me know what's going on. Thanks  ----- Message -----  From: Lexus Sibley  Sent: 10/24/2020   8:26 PM CDT  To: Jagruti Mccabe PA-C, [facility] Staff  Subject: Lab Client Services                              Good Morning,    My name is Lexus Sibley I work in the Lab Client Services. We had a problem with some lab work on this patient. If someone from your office could call us at [phone] or ext. [phone] that would be great. Anyone in my department can help.     Thank you

## 2020-10-29 ENCOUNTER — LAB VISIT (OUTPATIENT)
Dept: LAB | Facility: OTHER | Age: 30
End: 2020-10-29
Attending: PHYSICIAN ASSISTANT
Payer: COMMERCIAL

## 2020-10-29 DIAGNOSIS — Z79.899 ENCOUNTER FOR LONG-TERM (CURRENT) USE OF HIGH-RISK MEDICATION: ICD-10-CM

## 2020-10-29 LAB — B-HCG UR QL: NEGATIVE

## 2020-10-29 PROCEDURE — 81025 URINE PREGNANCY TEST: CPT

## 2020-10-30 NOTE — PROGRESS NOTES
Please contact pt to see if she ever received the letter or fax from her therapist about starting accutane. I have not received it.

## 2020-11-02 ENCOUNTER — PATIENT MESSAGE (OUTPATIENT)
Dept: DERMATOLOGY | Facility: CLINIC | Age: 30
End: 2020-11-02

## 2020-11-03 ENCOUNTER — PATIENT MESSAGE (OUTPATIENT)
Dept: DERMATOLOGY | Facility: CLINIC | Age: 30
End: 2020-11-03

## 2020-11-03 NOTE — PROGRESS NOTES
Subjective:      Delaney Arechiga is a 30 y.o. female who returns today regarding her nephrolithiasis.    The patient has a history of calcium stones. She is s/p ureteroscopy with LL in January with Dr. Ybarra.      She presents today reporting dysuria, frequency and urgency that began more than 10 days ago. She took Macrobid BID x 10 days which she had on hand. Continues to have dysuria, frequency and urgency. Also reports several episodes of right flank discomfort similar to her previous stone. This pain has now resolved. Denies fever/chills, hematuria, and N/V.     The following portions of the patient's history were reviewed and updated as appropriate: allergies, current medications, past family history, past medical history, past social history, past surgical history and problem list.    Review of Systems  Constitutional: no fever or chills  ENT: no nasal congestion or sore throat  Respiratory: no cough or shortness of breath  Cardiovascular: no chest pain or palpitations  Gastrointestinal: no nausea or vomiting, tolerating diet  Genitourinary: as per HPI  Hematologic/Lymphatic: no easy bruising or lymphadenopathy  Musculoskeletal: no arthralgias or myalgias  Neurological: no seizures or tremors  Behavioral/Psych: no auditory or visual hallucinations     Objective:   Vital Signs:  Vitals:    11/04/20 0719   BP: (!) 145/83   Pulse: 89       Physical Exam   General: alert and oriented, no acute distress  Head: normocephalic, atraumatic  Neck: supple, no lymphadenopathy, normal ROM, no masses  Respiratory: Symmetric expansion, non-labored breathing  Cardiovascular: regular rate and rhythm, nomal pulses, no peripheral edema  Abdomen: soft, non tender, non distended, no palpable masses, no hernias, no hepatomegaly or splenomegaly  Pelvic: deferred  Lymphatic: no inguinal nodes  Skin: normal coloration and turgor, no rashes, no suspicious skin lesions noted  Neuro: alert and oriented x3, no gross  deficits  Psych: normal judgment and insight, normal mood/affect and non-anxious  No CVA tenderness    Lab Review   Urinalysis demonstrates positive for leukocytes (trace)  Lab Results   Component Value Date    WBC 8.24 12/23/2019    HGB 14.2 12/23/2019    HCT 41.9 12/23/2019    MCV 88 12/23/2019     (H) 12/23/2019     Lab Results   Component Value Date    CREATININE 0.9 12/23/2019    BUN 11 12/23/2019     Imaging   None    Assessment:   Dysuria  Nephrolithiasis  Urinary frequency  Urinary urgency     Plan:   Diagnoses and all orders for this visit:    Dysuria  -     Urine culture  -     ciprofloxacin HCl (CIPRO) 500 MG tablet; Take 1 tablet (500 mg total) by mouth 2 (two) times daily. for 7 days    Nephrolithiasis  -     X-Ray KUB; Future  -     US Retroperitoneal Complete (Kidney and; Future  -     Pregnancy, urine rapid    Urinary frequency    Urinary urgency    Plan:  --Pt requests pregnancy test in order to restart her Accutane rx  --Urine culture   --Start Cipro BID x 7 days, will adjust if needed based on culture results (discussed black box warning)  --Pyridium PRN   --Follow up with YARON/KUB in 6 months to monitor stones

## 2020-11-04 ENCOUNTER — OFFICE VISIT (OUTPATIENT)
Dept: UROLOGY | Facility: CLINIC | Age: 30
End: 2020-11-04
Payer: COMMERCIAL

## 2020-11-04 ENCOUNTER — PATIENT MESSAGE (OUTPATIENT)
Dept: DERMATOLOGY | Facility: CLINIC | Age: 30
End: 2020-11-04

## 2020-11-04 VITALS — HEART RATE: 89 BPM | SYSTOLIC BLOOD PRESSURE: 145 MMHG | DIASTOLIC BLOOD PRESSURE: 83 MMHG

## 2020-11-04 DIAGNOSIS — N20.0 NEPHROLITHIASIS: ICD-10-CM

## 2020-11-04 DIAGNOSIS — R35.0 URINARY FREQUENCY: ICD-10-CM

## 2020-11-04 DIAGNOSIS — L70.0 ACNE VULGARIS: Primary | ICD-10-CM

## 2020-11-04 DIAGNOSIS — R39.15 URINARY URGENCY: ICD-10-CM

## 2020-11-04 DIAGNOSIS — R30.0 DYSURIA: Primary | ICD-10-CM

## 2020-11-04 LAB — B-HCG UR QL: NEGATIVE

## 2020-11-04 PROCEDURE — 99214 PR OFFICE/OUTPT VISIT, EST, LEVL IV, 30-39 MIN: ICD-10-PCS | Mod: S$GLB,,, | Performed by: NURSE PRACTITIONER

## 2020-11-04 PROCEDURE — 87186 SC STD MICRODIL/AGAR DIL: CPT

## 2020-11-04 PROCEDURE — 81025 URINE PREGNANCY TEST: CPT

## 2020-11-04 PROCEDURE — 87077 CULTURE AEROBIC IDENTIFY: CPT

## 2020-11-04 PROCEDURE — 99214 OFFICE O/P EST MOD 30 MIN: CPT | Mod: S$GLB,,, | Performed by: NURSE PRACTITIONER

## 2020-11-04 PROCEDURE — 87088 URINE BACTERIA CULTURE: CPT

## 2020-11-04 PROCEDURE — 87086 URINE CULTURE/COLONY COUNT: CPT

## 2020-11-04 RX ORDER — CIPROFLOXACIN 500 MG/1
500 TABLET ORAL 2 TIMES DAILY
Qty: 14 TABLET | Refills: 0 | Status: SHIPPED | OUTPATIENT
Start: 2020-11-04 | End: 2020-11-09

## 2020-11-04 RX ORDER — ISOTRETINOIN 20 MG/1
CAPSULE ORAL
Qty: 30 CAPSULE | Refills: 0 | Status: SHIPPED | OUTPATIENT
Start: 2020-11-04 | End: 2020-12-03 | Stop reason: SDUPTHER

## 2020-11-04 RX ORDER — ISOTRETINOIN 40 MG/1
CAPSULE ORAL
Qty: 30 CAPSULE | Refills: 0 | Status: SHIPPED | OUTPATIENT
Start: 2020-11-04 | End: 2020-12-03 | Stop reason: SDUPTHER

## 2020-11-04 NOTE — PROGRESS NOTES
Received fax from pt's therapist stating she approves of starting accutane and will scan into . Pregnancy test negative - update ipledge. Please let pt know I sent Rx to [facility] and they will call her to confirm mailing address, etc. She needs an appt with me in 1 month.

## 2020-11-05 ENCOUNTER — TELEPHONE (OUTPATIENT)
Dept: UROLOGY | Facility: CLINIC | Age: 30
End: 2020-11-05

## 2020-11-06 LAB — BACTERIA UR CULT: ABNORMAL

## 2020-11-08 ENCOUNTER — PATIENT MESSAGE (OUTPATIENT)
Dept: UROLOGY | Facility: CLINIC | Age: 30
End: 2020-11-08

## 2020-11-08 DIAGNOSIS — R30.0 DYSURIA: Primary | ICD-10-CM

## 2020-11-09 DIAGNOSIS — N30.00 ACUTE CYSTITIS WITHOUT HEMATURIA: Primary | ICD-10-CM

## 2020-11-09 RX ORDER — NITROFURANTOIN 25; 75 MG/1; MG/1
100 CAPSULE ORAL 2 TIMES DAILY
Qty: 14 CAPSULE | Refills: 0 | Status: SHIPPED | OUTPATIENT
Start: 2020-11-09 | End: 2020-11-16

## 2020-11-13 ENCOUNTER — LAB VISIT (OUTPATIENT)
Dept: LAB | Facility: OTHER | Age: 30
End: 2020-11-13
Attending: NURSE PRACTITIONER
Payer: COMMERCIAL

## 2020-11-13 ENCOUNTER — PATIENT MESSAGE (OUTPATIENT)
Dept: UROLOGY | Facility: CLINIC | Age: 30
End: 2020-11-13

## 2020-11-13 DIAGNOSIS — N30.00 ACUTE CYSTITIS WITHOUT HEMATURIA: Primary | ICD-10-CM

## 2020-11-13 DIAGNOSIS — R30.0 DYSURIA: ICD-10-CM

## 2020-11-13 PROCEDURE — 87086 URINE CULTURE/COLONY COUNT: CPT

## 2020-11-13 RX ORDER — DOXYCYCLINE 100 MG/1
100 CAPSULE ORAL EVERY 12 HOURS
Qty: 14 CAPSULE | Refills: 0 | Status: SHIPPED | OUTPATIENT
Start: 2020-11-13 | End: 2020-11-20

## 2020-11-14 LAB — BACTERIA UR CULT: NO GROWTH

## 2020-12-02 ENCOUNTER — LAB VISIT (OUTPATIENT)
Dept: LAB | Facility: HOSPITAL | Age: 30
End: 2020-12-02
Payer: COMMERCIAL

## 2020-12-02 ENCOUNTER — OFFICE VISIT (OUTPATIENT)
Dept: DERMATOLOGY | Facility: CLINIC | Age: 30
End: 2020-12-02
Payer: COMMERCIAL

## 2020-12-02 DIAGNOSIS — Z79.899 ENCOUNTER FOR LONG-TERM (CURRENT) USE OF HIGH-RISK MEDICATION: Primary | ICD-10-CM

## 2020-12-02 DIAGNOSIS — Z79.899 ENCOUNTER FOR LONG-TERM (CURRENT) USE OF HIGH-RISK MEDICATION: ICD-10-CM

## 2020-12-02 DIAGNOSIS — L70.0 ACNE VULGARIS: ICD-10-CM

## 2020-12-02 LAB — B-HCG UR QL: NEGATIVE

## 2020-12-02 PROCEDURE — 1126F PR PAIN SEVERITY QUANTIFIED, NO PAIN PRESENT: ICD-10-PCS | Mod: S$GLB,,, | Performed by: PHYSICIAN ASSISTANT

## 2020-12-02 PROCEDURE — 1126F AMNT PAIN NOTED NONE PRSNT: CPT | Mod: S$GLB,,, | Performed by: PHYSICIAN ASSISTANT

## 2020-12-02 PROCEDURE — 81025 URINE PREGNANCY TEST: CPT

## 2020-12-02 PROCEDURE — 99999 PR PBB SHADOW E&M-EST. PATIENT-LVL III: CPT | Mod: PBBFAC,,, | Performed by: PHYSICIAN ASSISTANT

## 2020-12-02 PROCEDURE — 99999 PR PBB SHADOW E&M-EST. PATIENT-LVL III: ICD-10-PCS | Mod: PBBFAC,,, | Performed by: PHYSICIAN ASSISTANT

## 2020-12-02 PROCEDURE — 99213 PR OFFICE/OUTPT VISIT, EST, LEVL III, 20-29 MIN: ICD-10-PCS | Mod: S$GLB,,, | Performed by: PHYSICIAN ASSISTANT

## 2020-12-02 PROCEDURE — 99213 OFFICE O/P EST LOW 20 MIN: CPT | Mod: S$GLB,,, | Performed by: PHYSICIAN ASSISTANT

## 2020-12-02 NOTE — PROGRESS NOTES
Subjective:       Patient ID:  Delaney Arechiga is a 30 y.o. female who presents for   Chief Complaint   Patient presents with    Acne     Follow up     Acne - Follow-up  Symptom course: improving (last seen 9/25/20)  Currently using: amnesteem 60 mg qd - end of month 1.  Affected locations: face and neck  Signs / symptoms: asymptomatic        Review of Systems   Constitutional: Positive for fatigue.   HENT: Positive for headaches (occ migraines). Negative for nosebleeds.    Gastrointestinal: Positive for Sensitivity to oral antibiotics (clinda, sulfa, and doxy). Negative for diarrhea and constipation.   Genitourinary: Negative for irregular periods (has mirena).   Musculoskeletal: Positive for arthralgias (lower back - mild).   Skin: Positive for activity-related sunscreen use. Negative for dry skin, daily sunscreen use, recent sunburn and dry lips.   Neurological: Positive for headaches (occ migraines).   Psychiatric/Behavioral: Negative for depressed mood (hx of - controlled on wellbutrin; seeing therapist twice monthly and received approval letter prior to starting amnesteem).        Objective:    Physical Exam   Constitutional: She appears well-developed and well-nourished. No distress.   Neurological: She is alert and oriented to person, place, and time. She is not disoriented.   Psychiatric: She has a normal mood and affect.   Skin:   Areas Examined (abnormalities noted in diagram):   Head / Face Inspection Performed  Neck Inspection Performed  Chest / Axilla Inspection Performed  Back Inspection Performed  RUE Inspected  LUE Inspection Performed                   Diagram Legend     Erythematous scaling macule/papule c/w actinic keratosis       Vascular papule c/w angioma      Pigmented verrucoid papule/plaque c/w seborrheic keratosis      Yellow umbilicated papule c/w sebaceous hyperplasia      Irregularly shaped tan macule c/w lentigo     1-2 mm smooth white papules consistent with Milia       Movable subcutaneous cyst with punctum c/w epidermal inclusion cyst      Subcutaneous movable cyst c/w pilar cyst      Firm pink to brown papule c/w dermatofibroma      Pedunculated fleshy papule(s) c/w skin tag(s)      Evenly pigmented macule c/w junctional nevus     Mildly variegated pigmented, slightly irregular-bordered macule c/w mildly atypical nevus      Flesh colored to evenly pigmented papule c/w intradermal nevus       Pink pearly papule/plaque c/w basal cell carcinoma      Erythematous hyperkeratotic cursted plaque c/w SCC      Surgical scar with no sign of skin cancer recurrence      Open and closed comedones      Inflammatory papules and pustules      Verrucoid papule consistent consistent with wart     Erythematous eczematous patches and plaques     Dystrophic onycholytic nail with subungual debris c/w onychomycosis     Umbilicated papule    Erythematous-base heme-crusted tan verrucoid plaque consistent with inflamed seborrheic keratosis     Erythematous Silvery Scaling Plaque c/w Psoriasis     See annotation    Assessment / Plan:        Encounter for long-term (current) use of high-risk medication  -     Pregnancy, urine rapid; Future    Acne vulgaris  Today's Plan:    Pt tolerating tx very well. Once negative UPT is received, will send Rx of amnesteem 60 mg qd.  Continue 2 forms of contraception.    Follow up in about 1 month (around 1/2/2021).

## 2020-12-02 NOTE — ASSESSMENT & PLAN NOTE
Today's Plan:    Pt tolerating tx very well. Once negative UPT is received, will send Rx of amnesteem 60 mg qd.

## 2020-12-03 DIAGNOSIS — Z79.899 ENCOUNTER FOR LONG-TERM (CURRENT) USE OF HIGH-RISK MEDICATION: Primary | ICD-10-CM

## 2020-12-03 DIAGNOSIS — L70.0 ACNE VULGARIS: ICD-10-CM

## 2020-12-03 RX ORDER — ISOTRETINOIN 20 MG/1
CAPSULE ORAL
Qty: 30 CAPSULE | Refills: 0 | Status: SHIPPED | OUTPATIENT
Start: 2020-12-03 | End: 2021-03-31 | Stop reason: SDUPTHER

## 2020-12-03 RX ORDER — ISOTRETINOIN 40 MG/1
CAPSULE ORAL
Qty: 30 CAPSULE | Refills: 0 | Status: SHIPPED | OUTPATIENT
Start: 2020-12-03 | End: 2021-01-14 | Stop reason: SDUPTHER

## 2020-12-31 ENCOUNTER — PATIENT MESSAGE (OUTPATIENT)
Dept: DERMATOLOGY | Facility: CLINIC | Age: 30
End: 2020-12-31

## 2020-12-31 ENCOUNTER — LAB VISIT (OUTPATIENT)
Dept: LAB | Facility: HOSPITAL | Age: 30
End: 2020-12-31
Attending: PHYSICIAN ASSISTANT
Payer: COMMERCIAL

## 2020-12-31 ENCOUNTER — OFFICE VISIT (OUTPATIENT)
Dept: DERMATOLOGY | Facility: CLINIC | Age: 30
End: 2020-12-31
Payer: COMMERCIAL

## 2020-12-31 DIAGNOSIS — Z79.899 ENCOUNTER FOR LONG-TERM (CURRENT) USE OF HIGH-RISK MEDICATION: ICD-10-CM

## 2020-12-31 DIAGNOSIS — L70.0 ACNE VULGARIS: Primary | ICD-10-CM

## 2020-12-31 LAB
ALBUMIN SERPL BCP-MCNC: 4.1 G/DL (ref 3.5–5.2)
ALP SERPL-CCNC: 72 U/L (ref 55–135)
ALT SERPL W/O P-5'-P-CCNC: 147 U/L (ref 10–44)
AST SERPL-CCNC: 77 U/L (ref 10–40)
BILIRUB DIRECT SERPL-MCNC: 0.3 MG/DL (ref 0.1–0.3)
BILIRUB SERPL-MCNC: 0.7 MG/DL (ref 0.1–1)
CHOLEST SERPL-MCNC: 188 MG/DL (ref 120–199)
CHOLEST/HDLC SERPL: 2.8 {RATIO} (ref 2–5)
HDLC SERPL-MCNC: 66 MG/DL (ref 40–75)
HDLC SERPL: 35.1 % (ref 20–50)
LDLC SERPL CALC-MCNC: 101.8 MG/DL (ref 63–159)
NONHDLC SERPL-MCNC: 122 MG/DL
PROT SERPL-MCNC: 7.6 G/DL (ref 6–8.4)
TRIGL SERPL-MCNC: 101 MG/DL (ref 30–150)

## 2020-12-31 PROCEDURE — 99214 OFFICE O/P EST MOD 30 MIN: CPT | Mod: S$GLB,,, | Performed by: PHYSICIAN ASSISTANT

## 2020-12-31 PROCEDURE — 80076 HEPATIC FUNCTION PANEL: CPT

## 2020-12-31 PROCEDURE — 99999 PR PBB SHADOW E&M-EST. PATIENT-LVL II: ICD-10-PCS | Mod: PBBFAC,,, | Performed by: PHYSICIAN ASSISTANT

## 2020-12-31 PROCEDURE — 80061 LIPID PANEL: CPT

## 2020-12-31 PROCEDURE — 99999 PR PBB SHADOW E&M-EST. PATIENT-LVL II: CPT | Mod: PBBFAC,,, | Performed by: PHYSICIAN ASSISTANT

## 2020-12-31 PROCEDURE — 1126F PR PAIN SEVERITY QUANTIFIED, NO PAIN PRESENT: ICD-10-PCS | Mod: S$GLB,,, | Performed by: PHYSICIAN ASSISTANT

## 2020-12-31 PROCEDURE — 1126F AMNT PAIN NOTED NONE PRSNT: CPT | Mod: S$GLB,,, | Performed by: PHYSICIAN ASSISTANT

## 2020-12-31 PROCEDURE — 99214 PR OFFICE/OUTPT VISIT, EST, LEVL IV, 30-39 MIN: ICD-10-PCS | Mod: S$GLB,,, | Performed by: PHYSICIAN ASSISTANT

## 2020-12-31 PROCEDURE — 36415 COLL VENOUS BLD VENIPUNCTURE: CPT

## 2020-12-31 NOTE — ASSESSMENT & PLAN NOTE
Today's Plan:    LFTs elevated today. Hold amnesteem and recheck in 2 wks.  Continue 2 forms of contraception (condoms and IUD).

## 2020-12-31 NOTE — PROGRESS NOTES
Subjective:       Patient ID:  Delaney Arechiga is a 30 y.o. female who presents for   Chief Complaint   Patient presents with    Acne     f/u     Acne - Follow-up  Symptom course: improving (last seen 12/2/20)  Currently using: amnesteem 60 mg qd - end of month 2.  Affected locations: face and neck  Signs / symptoms: asymptomatic        Review of Systems   Constitutional: Positive for fatigue.   HENT: Positive for nosebleeds (minimal amt of dried blood) and headaches (occ migraines; few mild HA this week but thinks 2/2 drinking more caffeine than normal last weekend).    Gastrointestinal: Positive for Sensitivity to oral antibiotics (clinda, sulfa, and doxy). Negative for diarrhea and constipation.   Genitourinary: Negative for irregular periods (has mirena).   Musculoskeletal: Positive for arthralgias (lower back - mild).   Skin: Positive for dry skin, activity-related sunscreen use and dry lips. Negative for daily sunscreen use and recent sunburn.   Neurological: Positive for headaches (occ migraines; few mild HA this week but thinks 2/2 drinking more caffeine than normal last weekend).   Psychiatric/Behavioral: Negative for depressed mood (hx of - controlled on wellbutrin; seeing therapist twice monthly and received approval letter prior to starting amnesteem).        Objective:    Physical Exam   Constitutional: She appears well-developed and well-nourished. No distress.   Neurological: She is alert and oriented to person, place, and time. She is not disoriented.   Psychiatric: She has a normal mood and affect.   Skin:   Areas Examined (abnormalities noted in diagram):   Head / Face Inspection Performed  Neck Inspection Performed  Chest / Axilla Inspection Performed  Back Inspection Performed  RUE Inspected  LUE Inspection Performed                   Diagram Legend     Erythematous scaling macule/papule c/w actinic keratosis       Vascular papule c/w angioma      Pigmented verrucoid papule/plaque c/w  seborrheic keratosis      Yellow umbilicated papule c/w sebaceous hyperplasia      Irregularly shaped tan macule c/w lentigo     1-2 mm smooth white papules consistent with Milia      Movable subcutaneous cyst with punctum c/w epidermal inclusion cyst      Subcutaneous movable cyst c/w pilar cyst      Firm pink to brown papule c/w dermatofibroma      Pedunculated fleshy papule(s) c/w skin tag(s)      Evenly pigmented macule c/w junctional nevus     Mildly variegated pigmented, slightly irregular-bordered macule c/w mildly atypical nevus      Flesh colored to evenly pigmented papule c/w intradermal nevus       Pink pearly papule/plaque c/w basal cell carcinoma      Erythematous hyperkeratotic cursted plaque c/w SCC      Surgical scar with no sign of skin cancer recurrence      Open and closed comedones      Inflammatory papules and pustules      Verrucoid papule consistent consistent with wart     Erythematous eczematous patches and plaques     Dystrophic onycholytic nail with subungual debris c/w onychomycosis     Umbilicated papule    Erythematous-base heme-crusted tan verrucoid plaque consistent with inflamed seborrheic keratosis     Erythematous Silvery Scaling Plaque c/w Psoriasis     See annotation    Lab Results   Component Value Date     (H) 12/31/2020    AST 77 (H) 12/31/2020    ALKPHOS 72 12/31/2020    BILITOT 0.7 12/31/2020     Lab Results   Component Value Date    CHOL 188 12/31/2020    CHOL 177 09/25/2020    CHOL 148 03/28/2019     Lab Results   Component Value Date    HDL 66 12/31/2020    HDL 66 09/25/2020    HDL 59 03/28/2019     Lab Results   Component Value Date    LDLCALC 101.8 12/31/2020    LDLCALC 88.6 09/25/2020    LDLCALC 72.6 03/28/2019     Lab Results   Component Value Date    TRIG 101 12/31/2020    TRIG 112 09/25/2020    TRIG 82 03/28/2019     Lab Results   Component Value Date    CHOLHDL 35.1 12/31/2020    CHOLHDL 37.3 09/25/2020    CHOLHDL 39.9 03/28/2019     Assessment / Plan:     "  Encounter for long-term (current) use of high-risk medication  -     Hepatic Function Panel; Future; Expected date: 12/31/2020    Discussed elevated LFTs with pt. Denies alcohol intake but did drink " few" 5 hour energy drinks last weekend (4 days ago). Hold amnesteem until LFTs are rechecked in 2 wks.  D/c 5 hour energy drinks and otc supplements.    Acne vulgaris  Today's Plan:    LFTs elevated today. Hold amnesteem and recheck in 2 wks.  Continue 2 forms of contraception (condoms and IUD).      Follow up in about 1 month (around 1/31/2021).    "

## 2021-01-14 ENCOUNTER — PATIENT MESSAGE (OUTPATIENT)
Dept: DERMATOLOGY | Facility: CLINIC | Age: 31
End: 2021-01-14

## 2021-01-14 DIAGNOSIS — Z79.899 ENCOUNTER FOR LONG-TERM (CURRENT) USE OF HIGH-RISK MEDICATION: Primary | ICD-10-CM

## 2021-01-14 DIAGNOSIS — L70.0 ACNE VULGARIS: ICD-10-CM

## 2021-01-14 RX ORDER — ISOTRETINOIN 40 MG/1
CAPSULE ORAL
Qty: 30 CAPSULE | Refills: 0 | Status: SHIPPED | OUTPATIENT
Start: 2021-01-14 | End: 2021-02-19 | Stop reason: SDUPTHER

## 2021-01-15 ENCOUNTER — LAB VISIT (OUTPATIENT)
Dept: LAB | Facility: OTHER | Age: 31
End: 2021-01-15
Attending: PHYSICIAN ASSISTANT
Payer: COMMERCIAL

## 2021-01-15 DIAGNOSIS — Z79.899 ENCOUNTER FOR LONG-TERM (CURRENT) USE OF HIGH-RISK MEDICATION: ICD-10-CM

## 2021-01-15 LAB — B-HCG UR QL: NEGATIVE

## 2021-01-15 PROCEDURE — 81025 URINE PREGNANCY TEST: CPT

## 2021-01-20 ENCOUNTER — PATIENT MESSAGE (OUTPATIENT)
Dept: DERMATOLOGY | Facility: CLINIC | Age: 31
End: 2021-01-20

## 2021-01-21 ENCOUNTER — TELEPHONE (OUTPATIENT)
Dept: DERMATOLOGY | Facility: CLINIC | Age: 31
End: 2021-01-21

## 2021-02-19 ENCOUNTER — OFFICE VISIT (OUTPATIENT)
Dept: DERMATOLOGY | Facility: CLINIC | Age: 31
End: 2021-02-19
Payer: COMMERCIAL

## 2021-02-19 ENCOUNTER — PATIENT MESSAGE (OUTPATIENT)
Dept: DERMATOLOGY | Facility: CLINIC | Age: 31
End: 2021-02-19

## 2021-02-19 ENCOUNTER — LAB VISIT (OUTPATIENT)
Dept: LAB | Facility: HOSPITAL | Age: 31
End: 2021-02-19
Payer: COMMERCIAL

## 2021-02-19 DIAGNOSIS — Z79.899 ENCOUNTER FOR LONG-TERM (CURRENT) USE OF HIGH-RISK MEDICATION: Primary | ICD-10-CM

## 2021-02-19 DIAGNOSIS — L70.0 ACNE VULGARIS: ICD-10-CM

## 2021-02-19 DIAGNOSIS — Z79.899 ENCOUNTER FOR LONG-TERM (CURRENT) USE OF HIGH-RISK MEDICATION: ICD-10-CM

## 2021-02-19 LAB
ALBUMIN SERPL BCP-MCNC: 4 G/DL (ref 3.5–5.2)
ALP SERPL-CCNC: 60 U/L (ref 55–135)
ALT SERPL W/O P-5'-P-CCNC: 28 U/L (ref 10–44)
AST SERPL-CCNC: 27 U/L (ref 10–40)
BILIRUB DIRECT SERPL-MCNC: 0.2 MG/DL (ref 0.1–0.3)
BILIRUB SERPL-MCNC: 0.4 MG/DL (ref 0.1–1)
CHOLEST SERPL-MCNC: 172 MG/DL (ref 120–199)
CHOLEST/HDLC SERPL: 3.1 {RATIO} (ref 2–5)
HCG INTACT+B SERPL-ACNC: <1.2 MIU/ML
HDLC SERPL-MCNC: 55 MG/DL (ref 40–75)
HDLC SERPL: 32 % (ref 20–50)
LDLC SERPL CALC-MCNC: 93.6 MG/DL (ref 63–159)
NONHDLC SERPL-MCNC: 117 MG/DL
PROT SERPL-MCNC: 6.9 G/DL (ref 6–8.4)
TRIGL SERPL-MCNC: 117 MG/DL (ref 30–150)

## 2021-02-19 PROCEDURE — 1125F PR PAIN SEVERITY QUANTIFIED, PAIN PRESENT: ICD-10-PCS | Mod: S$GLB,,, | Performed by: PHYSICIAN ASSISTANT

## 2021-02-19 PROCEDURE — 80061 LIPID PANEL: CPT

## 2021-02-19 PROCEDURE — 99999 PR PBB SHADOW E&M-EST. PATIENT-LVL III: CPT | Mod: PBBFAC,,, | Performed by: PHYSICIAN ASSISTANT

## 2021-02-19 PROCEDURE — 99214 PR OFFICE/OUTPT VISIT, EST, LEVL IV, 30-39 MIN: ICD-10-PCS | Mod: S$GLB,,, | Performed by: PHYSICIAN ASSISTANT

## 2021-02-19 PROCEDURE — 1125F AMNT PAIN NOTED PAIN PRSNT: CPT | Mod: S$GLB,,, | Performed by: PHYSICIAN ASSISTANT

## 2021-02-19 PROCEDURE — 99999 PR PBB SHADOW E&M-EST. PATIENT-LVL III: ICD-10-PCS | Mod: PBBFAC,,, | Performed by: PHYSICIAN ASSISTANT

## 2021-02-19 PROCEDURE — 99214 OFFICE O/P EST MOD 30 MIN: CPT | Mod: S$GLB,,, | Performed by: PHYSICIAN ASSISTANT

## 2021-02-19 PROCEDURE — 36415 COLL VENOUS BLD VENIPUNCTURE: CPT

## 2021-02-19 PROCEDURE — 84702 CHORIONIC GONADOTROPIN TEST: CPT

## 2021-02-19 PROCEDURE — 80076 HEPATIC FUNCTION PANEL: CPT

## 2021-02-19 RX ORDER — ISOTRETINOIN 40 MG/1
CAPSULE ORAL
Qty: 30 CAPSULE | Refills: 0 | Status: SHIPPED | OUTPATIENT
Start: 2021-02-19 | End: 2021-03-31 | Stop reason: SDUPTHER

## 2021-03-30 ENCOUNTER — OFFICE VISIT (OUTPATIENT)
Dept: DERMATOLOGY | Facility: CLINIC | Age: 31
End: 2021-03-30
Payer: COMMERCIAL

## 2021-03-30 ENCOUNTER — LAB VISIT (OUTPATIENT)
Dept: LAB | Facility: HOSPITAL | Age: 31
End: 2021-03-30
Payer: COMMERCIAL

## 2021-03-30 DIAGNOSIS — Z79.899 ENCOUNTER FOR LONG-TERM (CURRENT) USE OF HIGH-RISK MEDICATION: ICD-10-CM

## 2021-03-30 DIAGNOSIS — L70.0 ACNE VULGARIS: ICD-10-CM

## 2021-03-30 DIAGNOSIS — Z79.899 ENCOUNTER FOR LONG-TERM (CURRENT) USE OF HIGH-RISK MEDICATION: Primary | ICD-10-CM

## 2021-03-30 LAB — B-HCG UR QL: NEGATIVE

## 2021-03-30 PROCEDURE — 99999 PR PBB SHADOW E&M-EST. PATIENT-LVL II: CPT | Mod: PBBFAC,,, | Performed by: PHYSICIAN ASSISTANT

## 2021-03-30 PROCEDURE — 99214 PR OFFICE/OUTPT VISIT, EST, LEVL IV, 30-39 MIN: ICD-10-PCS | Mod: S$GLB,,, | Performed by: PHYSICIAN ASSISTANT

## 2021-03-30 PROCEDURE — 1126F AMNT PAIN NOTED NONE PRSNT: CPT | Mod: S$GLB,,, | Performed by: PHYSICIAN ASSISTANT

## 2021-03-30 PROCEDURE — 1126F PR PAIN SEVERITY QUANTIFIED, NO PAIN PRESENT: ICD-10-PCS | Mod: S$GLB,,, | Performed by: PHYSICIAN ASSISTANT

## 2021-03-30 PROCEDURE — 99999 PR PBB SHADOW E&M-EST. PATIENT-LVL II: ICD-10-PCS | Mod: PBBFAC,,, | Performed by: PHYSICIAN ASSISTANT

## 2021-03-30 PROCEDURE — 99214 OFFICE O/P EST MOD 30 MIN: CPT | Mod: S$GLB,,, | Performed by: PHYSICIAN ASSISTANT

## 2021-03-30 PROCEDURE — 81025 URINE PREGNANCY TEST: CPT | Performed by: PHYSICIAN ASSISTANT

## 2021-03-31 RX ORDER — ISOTRETINOIN 20 MG/1
CAPSULE ORAL
Qty: 30 CAPSULE | Refills: 0 | Status: SHIPPED | OUTPATIENT
Start: 2021-03-31 | End: 2021-05-13

## 2021-03-31 RX ORDER — ISOTRETINOIN 40 MG/1
CAPSULE ORAL
Qty: 30 CAPSULE | Refills: 0 | Status: SHIPPED | OUTPATIENT
Start: 2021-03-31 | End: 2021-05-13

## 2021-04-01 ENCOUNTER — PATIENT MESSAGE (OUTPATIENT)
Dept: DERMATOLOGY | Facility: CLINIC | Age: 31
End: 2021-04-01

## 2021-04-28 ENCOUNTER — PATIENT MESSAGE (OUTPATIENT)
Dept: RESEARCH | Facility: HOSPITAL | Age: 31
End: 2021-04-28

## 2021-05-12 ENCOUNTER — HOSPITAL ENCOUNTER (OUTPATIENT)
Dept: RADIOLOGY | Facility: OTHER | Age: 31
Discharge: HOME | End: 2021-05-12
Attending: NURSE PRACTITIONER
Payer: COMMERCIAL

## 2021-05-12 ENCOUNTER — OFFICE VISIT (OUTPATIENT)
Dept: DERMATOLOGY | Facility: CLINIC | Age: 31
End: 2021-05-12
Payer: COMMERCIAL

## 2021-05-12 ENCOUNTER — OFFICE VISIT (OUTPATIENT)
Dept: OBSTETRICS AND GYNECOLOGY | Facility: CLINIC | Age: 31
End: 2021-05-12
Payer: COMMERCIAL

## 2021-05-12 VITALS
WEIGHT: 135.38 LBS | BODY MASS INDEX: 23.99 KG/M2 | SYSTOLIC BLOOD PRESSURE: 116 MMHG | HEIGHT: 63 IN | DIASTOLIC BLOOD PRESSURE: 68 MMHG

## 2021-05-12 DIAGNOSIS — N20.0 NEPHROLITHIASIS: ICD-10-CM

## 2021-05-12 DIAGNOSIS — L70.0 ACNE VULGARIS: ICD-10-CM

## 2021-05-12 DIAGNOSIS — Z11.3 VENEREAL DISEASE SCREENING: Primary | ICD-10-CM

## 2021-05-12 DIAGNOSIS — Z79.899 ENCOUNTER FOR LONG-TERM (CURRENT) USE OF HIGH-RISK MEDICATION: Primary | ICD-10-CM

## 2021-05-12 PROCEDURE — 99213 OFFICE O/P EST LOW 20 MIN: CPT | Mod: S$GLB,,, | Performed by: OBSTETRICS & GYNECOLOGY

## 2021-05-12 PROCEDURE — 99999 PR PBB SHADOW E&M-EST. PATIENT-LVL II: CPT | Mod: PBBFAC,,, | Performed by: PHYSICIAN ASSISTANT

## 2021-05-12 PROCEDURE — 87591 N.GONORRHOEAE DNA AMP PROB: CPT | Performed by: OBSTETRICS & GYNECOLOGY

## 2021-05-12 PROCEDURE — 99214 OFFICE O/P EST MOD 30 MIN: CPT | Mod: S$GLB,,, | Performed by: PHYSICIAN ASSISTANT

## 2021-05-12 PROCEDURE — 99999 PR PBB SHADOW E&M-EST. PATIENT-LVL III: ICD-10-PCS | Mod: PBBFAC,,, | Performed by: OBSTETRICS & GYNECOLOGY

## 2021-05-12 PROCEDURE — 99999 PR PBB SHADOW E&M-EST. PATIENT-LVL II: ICD-10-PCS | Mod: PBBFAC,,, | Performed by: PHYSICIAN ASSISTANT

## 2021-05-12 PROCEDURE — 74018 XR KUB: ICD-10-PCS | Mod: 26,,, | Performed by: RADIOLOGY

## 2021-05-12 PROCEDURE — 99213 PR OFFICE/OUTPT VISIT, EST, LEVL III, 20-29 MIN: ICD-10-PCS | Mod: S$GLB,,, | Performed by: OBSTETRICS & GYNECOLOGY

## 2021-05-12 PROCEDURE — 1126F AMNT PAIN NOTED NONE PRSNT: CPT | Mod: S$GLB,,, | Performed by: OBSTETRICS & GYNECOLOGY

## 2021-05-12 PROCEDURE — 1126F PR PAIN SEVERITY QUANTIFIED, NO PAIN PRESENT: ICD-10-PCS | Mod: S$GLB,,, | Performed by: PHYSICIAN ASSISTANT

## 2021-05-12 PROCEDURE — 1126F AMNT PAIN NOTED NONE PRSNT: CPT | Mod: S$GLB,,, | Performed by: PHYSICIAN ASSISTANT

## 2021-05-12 PROCEDURE — 87491 CHLMYD TRACH DNA AMP PROBE: CPT | Performed by: OBSTETRICS & GYNECOLOGY

## 2021-05-12 PROCEDURE — 76770 US EXAM ABDO BACK WALL COMP: CPT | Mod: 26,,, | Performed by: RADIOLOGY

## 2021-05-12 PROCEDURE — 1126F PR PAIN SEVERITY QUANTIFIED, NO PAIN PRESENT: ICD-10-PCS | Mod: S$GLB,,, | Performed by: OBSTETRICS & GYNECOLOGY

## 2021-05-12 PROCEDURE — 76770 US RETROPERITONEAL COMPLETE: ICD-10-PCS | Mod: 26,,, | Performed by: RADIOLOGY

## 2021-05-12 PROCEDURE — 76770 US EXAM ABDO BACK WALL COMP: CPT | Mod: TC

## 2021-05-12 PROCEDURE — 99214 PR OFFICE/OUTPT VISIT, EST, LEVL IV, 30-39 MIN: ICD-10-PCS | Mod: S$GLB,,, | Performed by: PHYSICIAN ASSISTANT

## 2021-05-12 PROCEDURE — 3008F PR BODY MASS INDEX (BMI) DOCUMENTED: ICD-10-PCS | Mod: CPTII,S$GLB,, | Performed by: OBSTETRICS & GYNECOLOGY

## 2021-05-12 PROCEDURE — 74018 RADEX ABDOMEN 1 VIEW: CPT | Mod: 26,,, | Performed by: RADIOLOGY

## 2021-05-12 PROCEDURE — 74018 RADEX ABDOMEN 1 VIEW: CPT | Mod: TC,FY

## 2021-05-12 PROCEDURE — 99999 PR PBB SHADOW E&M-EST. PATIENT-LVL III: CPT | Mod: PBBFAC,,, | Performed by: OBSTETRICS & GYNECOLOGY

## 2021-05-12 PROCEDURE — 3008F BODY MASS INDEX DOCD: CPT | Mod: CPTII,S$GLB,, | Performed by: OBSTETRICS & GYNECOLOGY

## 2021-05-13 ENCOUNTER — PATIENT MESSAGE (OUTPATIENT)
Dept: OBSTETRICS AND GYNECOLOGY | Facility: CLINIC | Age: 31
End: 2021-05-13

## 2021-05-13 RX ORDER — ISOTRETINOIN 10 MG/1
CAPSULE ORAL
Qty: 30 CAPSULE | Refills: 0 | Status: SHIPPED | OUTPATIENT
Start: 2021-05-13 | End: 2021-11-22

## 2021-05-13 RX ORDER — ISOTRETINOIN 40 MG/1
CAPSULE ORAL
Qty: 30 CAPSULE | Refills: 0 | Status: SHIPPED | OUTPATIENT
Start: 2021-05-13 | End: 2021-09-29

## 2021-05-13 RX ORDER — ISOTRETINOIN 20 MG/1
CAPSULE ORAL
Qty: 30 CAPSULE | Refills: 0 | Status: SHIPPED | OUTPATIENT
Start: 2021-05-13 | End: 2021-09-29

## 2021-05-14 LAB
C TRACH DNA SPEC QL NAA+PROBE: NOT DETECTED
N GONORRHOEA DNA SPEC QL NAA+PROBE: NOT DETECTED

## 2021-07-07 ENCOUNTER — PATIENT MESSAGE (OUTPATIENT)
Dept: ADMINISTRATIVE | Facility: HOSPITAL | Age: 31
End: 2021-07-07

## 2021-08-13 ENCOUNTER — PATIENT MESSAGE (OUTPATIENT)
Dept: OBSTETRICS AND GYNECOLOGY | Facility: CLINIC | Age: 31
End: 2021-08-13

## 2021-08-13 DIAGNOSIS — N80.9 ENDOMETRIOSIS: ICD-10-CM

## 2021-08-13 DIAGNOSIS — R10.2 FEMALE PELVIC PAIN: ICD-10-CM

## 2021-08-13 RX ORDER — ELAGOLIX 150 MG/1
150 TABLET, FILM COATED ORAL DAILY
Qty: 90 TABLET | Refills: 8 | Status: CANCELLED | OUTPATIENT
Start: 2021-08-13

## 2021-09-27 ENCOUNTER — OFFICE VISIT (OUTPATIENT)
Dept: OBSTETRICS AND GYNECOLOGY | Facility: CLINIC | Age: 31
End: 2021-09-27
Payer: COMMERCIAL

## 2021-09-27 VITALS
WEIGHT: 131.81 LBS | DIASTOLIC BLOOD PRESSURE: 89 MMHG | BODY MASS INDEX: 23.36 KG/M2 | SYSTOLIC BLOOD PRESSURE: 130 MMHG | HEIGHT: 63 IN

## 2021-09-27 DIAGNOSIS — R10.2 FEMALE PELVIC PAIN: ICD-10-CM

## 2021-09-27 DIAGNOSIS — Z97.5 CONTRACEPTION, DEVICE INTRAUTERINE: Primary | ICD-10-CM

## 2021-09-27 DIAGNOSIS — N80.9 ENDOMETRIOSIS: ICD-10-CM

## 2021-09-27 PROCEDURE — 3079F DIAST BP 80-89 MM HG: CPT | Mod: CPTII,S$GLB,, | Performed by: OBSTETRICS & GYNECOLOGY

## 2021-09-27 PROCEDURE — 3075F SYST BP GE 130 - 139MM HG: CPT | Mod: CPTII,S$GLB,, | Performed by: OBSTETRICS & GYNECOLOGY

## 2021-09-27 PROCEDURE — 1159F MED LIST DOCD IN RCRD: CPT | Mod: CPTII,S$GLB,, | Performed by: OBSTETRICS & GYNECOLOGY

## 2021-09-27 PROCEDURE — 3079F PR MOST RECENT DIASTOLIC BLOOD PRESSURE 80-89 MM HG: ICD-10-PCS | Mod: CPTII,S$GLB,, | Performed by: OBSTETRICS & GYNECOLOGY

## 2021-09-27 PROCEDURE — 1159F PR MEDICATION LIST DOCUMENTED IN MEDICAL RECORD: ICD-10-PCS | Mod: CPTII,S$GLB,, | Performed by: OBSTETRICS & GYNECOLOGY

## 2021-09-27 PROCEDURE — 3008F PR BODY MASS INDEX (BMI) DOCUMENTED: ICD-10-PCS | Mod: CPTII,S$GLB,, | Performed by: OBSTETRICS & GYNECOLOGY

## 2021-09-27 PROCEDURE — 99214 OFFICE O/P EST MOD 30 MIN: CPT | Mod: S$GLB,,, | Performed by: OBSTETRICS & GYNECOLOGY

## 2021-09-27 PROCEDURE — 99214 PR OFFICE/OUTPT VISIT, EST, LEVL IV, 30-39 MIN: ICD-10-PCS | Mod: S$GLB,,, | Performed by: OBSTETRICS & GYNECOLOGY

## 2021-09-27 PROCEDURE — 3075F PR MOST RECENT SYSTOLIC BLOOD PRESS GE 130-139MM HG: ICD-10-PCS | Mod: CPTII,S$GLB,, | Performed by: OBSTETRICS & GYNECOLOGY

## 2021-09-27 PROCEDURE — 1160F RVW MEDS BY RX/DR IN RCRD: CPT | Mod: CPTII,S$GLB,, | Performed by: OBSTETRICS & GYNECOLOGY

## 2021-09-27 PROCEDURE — 99999 PR PBB SHADOW E&M-EST. PATIENT-LVL III: CPT | Mod: PBBFAC,,, | Performed by: OBSTETRICS & GYNECOLOGY

## 2021-09-27 PROCEDURE — 1160F PR REVIEW ALL MEDS BY PRESCRIBER/CLIN PHARMACIST DOCUMENTED: ICD-10-PCS | Mod: CPTII,S$GLB,, | Performed by: OBSTETRICS & GYNECOLOGY

## 2021-09-27 PROCEDURE — 99999 PR PBB SHADOW E&M-EST. PATIENT-LVL III: ICD-10-PCS | Mod: PBBFAC,,, | Performed by: OBSTETRICS & GYNECOLOGY

## 2021-09-27 PROCEDURE — 3008F BODY MASS INDEX DOCD: CPT | Mod: CPTII,S$GLB,, | Performed by: OBSTETRICS & GYNECOLOGY

## 2021-10-21 ENCOUNTER — PROCEDURE VISIT (OUTPATIENT)
Dept: OBSTETRICS AND GYNECOLOGY | Facility: CLINIC | Age: 31
End: 2021-10-21
Payer: COMMERCIAL

## 2021-10-21 VITALS
WEIGHT: 134.69 LBS | DIASTOLIC BLOOD PRESSURE: 90 MMHG | BODY MASS INDEX: 23.86 KG/M2 | SYSTOLIC BLOOD PRESSURE: 131 MMHG

## 2021-10-21 DIAGNOSIS — Z30.433 ENCOUNTER FOR REMOVAL AND REINSERTION OF INTRAUTERINE CONTRACEPTIVE DEVICE (IUD): Primary | ICD-10-CM

## 2021-10-21 DIAGNOSIS — Z30.430 ENCOUNTER FOR IUD INSERTION: ICD-10-CM

## 2021-10-21 LAB
B-HCG UR QL: NEGATIVE
CTP QC/QA: YES

## 2021-10-21 PROCEDURE — 81025 POCT URINE PREGNANCY: ICD-10-PCS | Mod: S$GLB,,, | Performed by: OBSTETRICS & GYNECOLOGY

## 2021-10-21 PROCEDURE — 58301 PR REMOVE, INTRAUTERINE DEVICE: ICD-10-PCS | Mod: S$GLB,,, | Performed by: OBSTETRICS & GYNECOLOGY

## 2021-10-21 PROCEDURE — 58300 PR INSERT INTRAUTERINE DEVICE: ICD-10-PCS | Mod: S$GLB,,, | Performed by: OBSTETRICS & GYNECOLOGY

## 2021-10-21 PROCEDURE — 58301 REMOVE INTRAUTERINE DEVICE: CPT | Mod: S$GLB,,, | Performed by: OBSTETRICS & GYNECOLOGY

## 2021-10-21 PROCEDURE — 58300 INSERT INTRAUTERINE DEVICE: CPT | Mod: S$GLB,,, | Performed by: OBSTETRICS & GYNECOLOGY

## 2021-10-21 PROCEDURE — 81025 URINE PREGNANCY TEST: CPT | Mod: S$GLB,,, | Performed by: OBSTETRICS & GYNECOLOGY

## 2021-11-11 ENCOUNTER — OFFICE VISIT (OUTPATIENT)
Dept: INTERNAL MEDICINE | Facility: CLINIC | Age: 31
End: 2021-11-11
Payer: COMMERCIAL

## 2021-11-11 VITALS
HEART RATE: 76 BPM | SYSTOLIC BLOOD PRESSURE: 117 MMHG | BODY MASS INDEX: 23.55 KG/M2 | WEIGHT: 132.94 LBS | OXYGEN SATURATION: 97 % | DIASTOLIC BLOOD PRESSURE: 75 MMHG

## 2021-11-11 DIAGNOSIS — Z00.00 ANNUAL PHYSICAL EXAM: Primary | ICD-10-CM

## 2021-11-11 DIAGNOSIS — F33.1 MODERATE EPISODE OF RECURRENT MAJOR DEPRESSIVE DISORDER: ICD-10-CM

## 2021-11-11 PROCEDURE — 1159F PR MEDICATION LIST DOCUMENTED IN MEDICAL RECORD: ICD-10-PCS | Mod: CPTII,S$GLB,, | Performed by: INTERNAL MEDICINE

## 2021-11-11 PROCEDURE — 1160F RVW MEDS BY RX/DR IN RCRD: CPT | Mod: CPTII,S$GLB,, | Performed by: INTERNAL MEDICINE

## 2021-11-11 PROCEDURE — 99999 PR PBB SHADOW E&M-EST. PATIENT-LVL III: ICD-10-PCS | Mod: PBBFAC,,, | Performed by: INTERNAL MEDICINE

## 2021-11-11 PROCEDURE — 99999 PR PBB SHADOW E&M-EST. PATIENT-LVL III: CPT | Mod: PBBFAC,,, | Performed by: INTERNAL MEDICINE

## 2021-11-11 PROCEDURE — 1160F PR REVIEW ALL MEDS BY PRESCRIBER/CLIN PHARMACIST DOCUMENTED: ICD-10-PCS | Mod: CPTII,S$GLB,, | Performed by: INTERNAL MEDICINE

## 2021-11-11 PROCEDURE — 1159F MED LIST DOCD IN RCRD: CPT | Mod: CPTII,S$GLB,, | Performed by: INTERNAL MEDICINE

## 2021-11-11 PROCEDURE — 3008F BODY MASS INDEX DOCD: CPT | Mod: CPTII,S$GLB,, | Performed by: INTERNAL MEDICINE

## 2021-11-11 PROCEDURE — 99395 PREV VISIT EST AGE 18-39: CPT | Mod: S$GLB,,, | Performed by: INTERNAL MEDICINE

## 2021-11-11 PROCEDURE — 3008F PR BODY MASS INDEX (BMI) DOCUMENTED: ICD-10-PCS | Mod: CPTII,S$GLB,, | Performed by: INTERNAL MEDICINE

## 2021-11-11 PROCEDURE — 3078F DIAST BP <80 MM HG: CPT | Mod: CPTII,S$GLB,, | Performed by: INTERNAL MEDICINE

## 2021-11-11 PROCEDURE — 3078F PR MOST RECENT DIASTOLIC BLOOD PRESSURE < 80 MM HG: ICD-10-PCS | Mod: CPTII,S$GLB,, | Performed by: INTERNAL MEDICINE

## 2021-11-11 PROCEDURE — 3074F SYST BP LT 130 MM HG: CPT | Mod: CPTII,S$GLB,, | Performed by: INTERNAL MEDICINE

## 2021-11-11 PROCEDURE — 99395 PR PREVENTIVE VISIT,EST,18-39: ICD-10-PCS | Mod: S$GLB,,, | Performed by: INTERNAL MEDICINE

## 2021-11-11 PROCEDURE — 3074F PR MOST RECENT SYSTOLIC BLOOD PRESSURE < 130 MM HG: ICD-10-PCS | Mod: CPTII,S$GLB,, | Performed by: INTERNAL MEDICINE

## 2021-11-11 RX ORDER — BUSPIRONE HYDROCHLORIDE 5 MG/1
5 TABLET ORAL 2 TIMES DAILY
Qty: 180 TABLET | Refills: 1 | Status: SHIPPED | OUTPATIENT
Start: 2021-11-11 | End: 2022-03-10 | Stop reason: SDUPTHER

## 2021-11-11 RX ORDER — BUPROPION HYDROCHLORIDE 150 MG/1
150 TABLET ORAL DAILY
Qty: 90 TABLET | Refills: 1 | Status: SHIPPED | OUTPATIENT
Start: 2021-11-11 | End: 2022-03-10 | Stop reason: SDUPTHER

## 2021-11-18 ENCOUNTER — OFFICE VISIT (OUTPATIENT)
Dept: OBSTETRICS AND GYNECOLOGY | Facility: CLINIC | Age: 31
End: 2021-11-18
Payer: COMMERCIAL

## 2021-11-18 VITALS
WEIGHT: 129.63 LBS | BODY MASS INDEX: 22.97 KG/M2 | SYSTOLIC BLOOD PRESSURE: 110 MMHG | DIASTOLIC BLOOD PRESSURE: 74 MMHG | HEIGHT: 63 IN

## 2021-11-18 DIAGNOSIS — Z30.431 ENCOUNTER FOR ROUTINE CHECKING OF INTRAUTERINE CONTRACEPTIVE DEVICE (IUD): Primary | ICD-10-CM

## 2021-11-18 PROCEDURE — 1160F PR REVIEW ALL MEDS BY PRESCRIBER/CLIN PHARMACIST DOCUMENTED: ICD-10-PCS | Mod: CPTII,S$GLB,, | Performed by: OBSTETRICS & GYNECOLOGY

## 2021-11-18 PROCEDURE — 99999 PR PBB SHADOW E&M-EST. PATIENT-LVL III: ICD-10-PCS | Mod: PBBFAC,,, | Performed by: OBSTETRICS & GYNECOLOGY

## 2021-11-18 PROCEDURE — 3074F PR MOST RECENT SYSTOLIC BLOOD PRESSURE < 130 MM HG: ICD-10-PCS | Mod: CPTII,S$GLB,, | Performed by: OBSTETRICS & GYNECOLOGY

## 2021-11-18 PROCEDURE — 3078F DIAST BP <80 MM HG: CPT | Mod: CPTII,S$GLB,, | Performed by: OBSTETRICS & GYNECOLOGY

## 2021-11-18 PROCEDURE — 1159F MED LIST DOCD IN RCRD: CPT | Mod: CPTII,S$GLB,, | Performed by: OBSTETRICS & GYNECOLOGY

## 2021-11-18 PROCEDURE — 3008F BODY MASS INDEX DOCD: CPT | Mod: CPTII,S$GLB,, | Performed by: OBSTETRICS & GYNECOLOGY

## 2021-11-18 PROCEDURE — 99213 PR OFFICE/OUTPT VISIT, EST, LEVL III, 20-29 MIN: ICD-10-PCS | Mod: S$GLB,,, | Performed by: OBSTETRICS & GYNECOLOGY

## 2021-11-18 PROCEDURE — 3078F PR MOST RECENT DIASTOLIC BLOOD PRESSURE < 80 MM HG: ICD-10-PCS | Mod: CPTII,S$GLB,, | Performed by: OBSTETRICS & GYNECOLOGY

## 2021-11-18 PROCEDURE — 99999 PR PBB SHADOW E&M-EST. PATIENT-LVL III: CPT | Mod: PBBFAC,,, | Performed by: OBSTETRICS & GYNECOLOGY

## 2021-11-18 PROCEDURE — 1160F RVW MEDS BY RX/DR IN RCRD: CPT | Mod: CPTII,S$GLB,, | Performed by: OBSTETRICS & GYNECOLOGY

## 2021-11-18 PROCEDURE — 99213 OFFICE O/P EST LOW 20 MIN: CPT | Mod: S$GLB,,, | Performed by: OBSTETRICS & GYNECOLOGY

## 2021-11-18 PROCEDURE — 3008F PR BODY MASS INDEX (BMI) DOCUMENTED: ICD-10-PCS | Mod: CPTII,S$GLB,, | Performed by: OBSTETRICS & GYNECOLOGY

## 2021-11-18 PROCEDURE — 1159F PR MEDICATION LIST DOCUMENTED IN MEDICAL RECORD: ICD-10-PCS | Mod: CPTII,S$GLB,, | Performed by: OBSTETRICS & GYNECOLOGY

## 2021-11-18 PROCEDURE — 3074F SYST BP LT 130 MM HG: CPT | Mod: CPTII,S$GLB,, | Performed by: OBSTETRICS & GYNECOLOGY

## 2022-03-10 ENCOUNTER — PATIENT MESSAGE (OUTPATIENT)
Dept: INTERNAL MEDICINE | Facility: CLINIC | Age: 32
End: 2022-03-10
Payer: COMMERCIAL

## 2022-03-10 ENCOUNTER — PATIENT MESSAGE (OUTPATIENT)
Dept: OBSTETRICS AND GYNECOLOGY | Facility: CLINIC | Age: 32
End: 2022-03-10
Payer: COMMERCIAL

## 2022-03-10 DIAGNOSIS — F33.1 MODERATE EPISODE OF RECURRENT MAJOR DEPRESSIVE DISORDER: ICD-10-CM

## 2022-03-10 DIAGNOSIS — N80.9 ENDOMETRIOSIS: ICD-10-CM

## 2022-03-10 DIAGNOSIS — R10.2 FEMALE PELVIC PAIN: ICD-10-CM

## 2022-03-10 RX ORDER — BUPROPION HYDROCHLORIDE 150 MG/1
150 TABLET ORAL DAILY
Qty: 90 TABLET | Refills: 1 | Status: SHIPPED | OUTPATIENT
Start: 2022-03-10 | End: 2022-09-01

## 2022-03-10 RX ORDER — BUSPIRONE HYDROCHLORIDE 5 MG/1
5 TABLET ORAL 2 TIMES DAILY
Qty: 180 TABLET | Refills: 1 | Status: SHIPPED | OUTPATIENT
Start: 2022-03-10 | End: 2023-03-10

## 2022-03-10 NOTE — TELEPHONE ENCOUNTER
No new care gaps identified.  Powered by [employer]. Reference number: [id number].   3/10/2022 11:46:31 AM CST

## 2023-06-13 DIAGNOSIS — F33.1 MODERATE EPISODE OF RECURRENT MAJOR DEPRESSIVE DISORDER: ICD-10-CM

## 2023-06-13 NOTE — TELEPHONE ENCOUNTER
Refill Encounter    PCP Visits: Recent Visits  No visits were found meeting these conditions.  Showing recent visits within past 360 days and meeting all other requirements  Future Appointments  No visits were found meeting these conditions.  Showing future appointments within next 720 days and meeting all other requirements     Last 3 Blood Pressure:   BP Readings from Last 3 Encounters:   11/18/21 110/74   11/11/21 117/75   10/21/21 (!) 131/90     Preferred Pharmacy:   [id number] [address] [address]  Phone: [phone] Fax: [phone]    [id number] [address] [address] [address]  Phone: [phone] Fax: [phone]    [id number] - [facility] [id number] [facility] [address]  Phone: [phone] Fax: [phone]    Requested RX:  Requested Prescriptions     Pending Prescriptions Disp Refills    buPROPion (WELLBUTRIN XL) 150 MG TB24 tablet [Pharmacy Med Name: BUPROPION HCL  MG TABLET] 90 tablet 0     Sig: take 1 tablet by mouth once daily      RX Route: Normal

## 2023-06-13 NOTE — TELEPHONE ENCOUNTER
Care Due:                  Date            Visit Type   Department     Provider  --------------------------------------------------------------------------------                                MYCHART                              ANNUAL                              CHECKUP/PHY  [facility] INTERNAL  Last Visit: 11-      [facility]       Ale Quinn  Next Visit: None Scheduled  None         None Found                                                            Last  Test          Frequency    Reason                     Performed    Due Date  --------------------------------------------------------------------------------    Office Visit  12 months..  buPROPion................  11- 11-    [facility] Embedded Care Due Messages. Reference number: [id number].   6/13/2023 7:20:54 AM CDT

## 2023-06-14 RX ORDER — BUPROPION HYDROCHLORIDE 150 MG/1
TABLET ORAL
Qty: 90 TABLET | Refills: 0 | Status: SHIPPED | OUTPATIENT
Start: 2023-06-14

## (undated) DEVICE — UNDERGLOVES BIOGEL PI SZ 7 LF

## (undated) DEVICE — BRUSH SCRUB SURGICALW/BETADINE

## (undated) DEVICE — TRAY DRY SKIN SCRUB PREP

## (undated) DEVICE — Device

## (undated) DEVICE — STENT URETERAL UNIV 6FR 24CM: Type: IMPLANTABLE DEVICE | Site: URETER | Status: NON-FUNCTIONAL

## (undated) DEVICE — KIT WING PAD POSITIONING

## (undated) DEVICE — IRRIGATOR ENDOSCOPY DISP.

## (undated) DEVICE — PACK LAPAROSCOPY BAPTIST

## (undated) DEVICE — SOL PVP-I SCRUB 7.5% 4OZ

## (undated) DEVICE — SEE MEDLINE ITEM 157117

## (undated) DEVICE — SOL NS 1000CC

## (undated) DEVICE — CATH URTRL OPEN END STR TIP 5F

## (undated) DEVICE — SEE MEDLINE ITEM 152622

## (undated) DEVICE — GUIDE WIRE MOTION .035 X 150CM

## (undated) DEVICE — PAD PREP 50/CA

## (undated) DEVICE — SOL 9P NACL IRR PIC IL

## (undated) DEVICE — GLOVE BIOGEL SKINSENSE PI 7.5

## (undated) DEVICE — GLOVE BIOGEL SKINSENSE PI 6.5

## (undated) DEVICE — TROCAR KII FIOS 5MM X 100MM

## (undated) DEVICE — GLOVE BIOGEL SKINSENSE PI 7.0

## (undated) DEVICE — CONTAINER SPECIMEN STRL 4OZ

## (undated) DEVICE — SET IRR URLGY 2LINE UNIV SPIKE

## (undated) DEVICE — SOL IRR NACL .9% 3000ML

## (undated) DEVICE — SCRUB 10% POVIDONE IODINE 4OZ

## (undated) DEVICE — ELECTRODE REM PLYHSV RETURN 9

## (undated) DEVICE — UNDERGLOVE BIOGEL PI SZ 6.5 LF

## (undated) DEVICE — WIRE GUIDE 0.038OLD

## (undated) DEVICE — GUIDEWIRE NITINOL HYBRID 150CM

## (undated) DEVICE — SOL CLEARIFY VISUALIZATION LAP

## (undated) DEVICE — STENT URETERAL UNIV 6FR 22CM: Type: IMPLANTABLE DEVICE | Site: URETER | Status: NON-FUNCTIONAL

## (undated) DEVICE — EXTRACTOR TIPLESS 2.4FRX1115CM

## (undated) DEVICE — SUT MCRYL PLUS 4-0 PS2 27IN

## (undated) DEVICE — SET CYSTO IRRIGATION UNIV SPIK

## (undated) DEVICE — NDL INSUF ULTRA VERESS 120MM

## (undated) DEVICE — UNDERGLOVES BIOGEL PI SZ 6 LF